# Patient Record
Sex: FEMALE | Employment: OTHER | URBAN - METROPOLITAN AREA
[De-identification: names, ages, dates, MRNs, and addresses within clinical notes are randomized per-mention and may not be internally consistent; named-entity substitution may affect disease eponyms.]

---

## 2017-01-01 ENCOUNTER — APPOINTMENT (OUTPATIENT)
Dept: ULTRASOUND IMAGING | Age: 67
DRG: 872 | End: 2017-01-01
Attending: HOSPITALIST
Payer: MEDICARE

## 2017-01-01 ENCOUNTER — APPOINTMENT (OUTPATIENT)
Dept: NUCLEAR MEDICINE | Age: 67
DRG: 478 | End: 2017-01-01
Attending: INTERNAL MEDICINE
Payer: MEDICARE

## 2017-01-01 ENCOUNTER — PATIENT OUTREACH (OUTPATIENT)
Dept: INTERNAL MEDICINE CLINIC | Age: 67
End: 2017-01-01

## 2017-01-01 ENCOUNTER — HOME CARE VISIT (OUTPATIENT)
Dept: HOSPICE | Facility: HOSPICE | Age: 67
End: 2017-01-01
Payer: MEDICARE

## 2017-01-01 ENCOUNTER — APPOINTMENT (OUTPATIENT)
Dept: PHYSICAL THERAPY | Age: 67
End: 2017-01-01
Payer: MEDICARE

## 2017-01-01 ENCOUNTER — APPOINTMENT (OUTPATIENT)
Dept: MRI IMAGING | Age: 67
DRG: 478 | End: 2017-01-01
Attending: HOSPITALIST
Payer: MEDICARE

## 2017-01-01 ENCOUNTER — HOSPITAL ENCOUNTER (OUTPATIENT)
Dept: PHYSICAL THERAPY | Age: 67
Discharge: HOME | End: 2017-07-06
Payer: MEDICARE

## 2017-01-01 ENCOUNTER — APPOINTMENT (OUTPATIENT)
Dept: NUCLEAR MEDICINE | Age: 67
DRG: 872 | End: 2017-01-01
Attending: INTERNAL MEDICINE
Payer: MEDICARE

## 2017-01-01 ENCOUNTER — HOSPICE ADMISSION (OUTPATIENT)
Dept: HOSPICE | Facility: HOSPICE | Age: 67
End: 2017-01-01
Payer: MEDICARE

## 2017-01-01 ENCOUNTER — HOSPITAL ENCOUNTER (EMERGENCY)
Age: 67
Discharge: HOME | End: 2017-03-03
Attending: EMERGENCY MEDICINE

## 2017-01-01 ENCOUNTER — HOME CARE VISIT (OUTPATIENT)
Dept: SCHEDULING | Facility: HOME HEALTH | Age: 67
End: 2017-01-01
Payer: MEDICARE

## 2017-01-01 ENCOUNTER — OFFICE VISIT (OUTPATIENT)
Dept: INTERNAL MEDICINE CLINIC | Age: 67
End: 2017-01-01

## 2017-01-01 ENCOUNTER — HOSPITAL ENCOUNTER (EMERGENCY)
Age: 67
Discharge: HOME | End: 2017-05-19
Attending: EMERGENCY MEDICINE

## 2017-01-01 ENCOUNTER — HOSPITAL ENCOUNTER (OUTPATIENT)
Dept: PHYSICAL THERAPY | Age: 67
Discharge: HOME | End: 2017-07-14
Payer: MEDICARE

## 2017-01-01 ENCOUNTER — APPOINTMENT (OUTPATIENT)
Dept: GENERAL RADIOLOGY | Age: 67
End: 2017-01-01
Attending: EMERGENCY MEDICINE

## 2017-01-01 ENCOUNTER — APPOINTMENT (OUTPATIENT)
Dept: CT IMAGING | Age: 67
DRG: 478 | End: 2017-01-01
Attending: EMERGENCY MEDICINE
Payer: MEDICARE

## 2017-01-01 ENCOUNTER — HOSPITAL ENCOUNTER (OUTPATIENT)
Dept: LAB | Age: 67
Discharge: HOME | End: 2017-04-06

## 2017-01-01 ENCOUNTER — HOSPITAL ENCOUNTER (OUTPATIENT)
Dept: PHYSICAL THERAPY | Age: 67
Discharge: HOME | End: 2017-07-03
Payer: MEDICARE

## 2017-01-01 ENCOUNTER — TELEPHONE (OUTPATIENT)
Dept: INTERNAL MEDICINE CLINIC | Age: 67
End: 2017-01-01

## 2017-01-01 ENCOUNTER — HOSPITAL ENCOUNTER (INPATIENT)
Age: 67
LOS: 1 days | End: 2017-09-20
Attending: INTERNAL MEDICINE | Admitting: INTERNAL MEDICINE

## 2017-01-01 ENCOUNTER — HOSPITAL ENCOUNTER (OUTPATIENT)
Dept: PHYSICAL THERAPY | Age: 67
Discharge: HOME | End: 2017-06-27
Payer: MEDICARE

## 2017-01-01 ENCOUNTER — HOSPITAL ENCOUNTER (OUTPATIENT)
Dept: PHYSICAL THERAPY | Age: 67
Discharge: HOME | End: 2017-06-29
Payer: MEDICARE

## 2017-01-01 ENCOUNTER — ANESTHESIA EVENT (OUTPATIENT)
Dept: ENDOSCOPY | Age: 67
End: 2017-01-01
Payer: MEDICARE

## 2017-01-01 ENCOUNTER — HOSPITAL ENCOUNTER (OUTPATIENT)
Dept: PHYSICAL THERAPY | Age: 67
Discharge: HOME | End: 2017-07-20
Payer: MEDICARE

## 2017-01-01 ENCOUNTER — TELEPHONE (OUTPATIENT)
Dept: ONCOLOGY | Age: 67
End: 2017-01-01

## 2017-01-01 ENCOUNTER — HOSPITAL ENCOUNTER (OUTPATIENT)
Dept: MAMMOGRAPHY | Age: 67
Discharge: HOME | End: 2017-04-14
Attending: FAMILY MEDICINE
Payer: MEDICARE

## 2017-01-01 ENCOUNTER — APPOINTMENT (OUTPATIENT)
Dept: GENERAL RADIOLOGY | Age: 67
DRG: 872 | End: 2017-01-01
Attending: EMERGENCY MEDICINE
Payer: MEDICARE

## 2017-01-01 ENCOUNTER — APPOINTMENT (OUTPATIENT)
Dept: CT IMAGING | Age: 67
DRG: 872 | End: 2017-01-01
Attending: HOSPITALIST
Payer: MEDICARE

## 2017-01-01 ENCOUNTER — HOSPITAL ENCOUNTER (INPATIENT)
Age: 67
LOS: 2 days | Discharge: HOME | DRG: 478 | End: 2017-08-09
Attending: EMERGENCY MEDICINE | Admitting: HOSPITALIST
Payer: MEDICARE

## 2017-01-01 ENCOUNTER — APPOINTMENT (OUTPATIENT)
Dept: CT IMAGING | Age: 67
DRG: 478 | End: 2017-01-01
Attending: INTERNAL MEDICINE
Payer: MEDICARE

## 2017-01-01 ENCOUNTER — ANESTHESIA (OUTPATIENT)
Dept: ENDOSCOPY | Age: 67
End: 2017-01-01
Payer: MEDICARE

## 2017-01-01 ENCOUNTER — HOSPITAL ENCOUNTER (OUTPATIENT)
Dept: MRI IMAGING | Age: 67
Discharge: HOME | End: 2017-07-29
Attending: INTERNAL MEDICINE
Payer: MEDICARE

## 2017-01-01 ENCOUNTER — HOSPITAL ENCOUNTER (INPATIENT)
Age: 67
LOS: 5 days | Discharge: HOME | DRG: 872 | End: 2017-08-16
Attending: EMERGENCY MEDICINE | Admitting: HOSPITALIST
Payer: MEDICARE

## 2017-01-01 ENCOUNTER — HOSPITAL ENCOUNTER (OUTPATIENT)
Age: 67
Setting detail: OUTPATIENT SURGERY
Discharge: HOME | End: 2017-05-10
Attending: INTERNAL MEDICINE | Admitting: INTERNAL MEDICINE
Payer: MEDICARE

## 2017-01-01 VITALS
SYSTOLIC BLOOD PRESSURE: 101 MMHG | TEMPERATURE: 97.2 F | DIASTOLIC BLOOD PRESSURE: 61 MMHG | OXYGEN SATURATION: 99 % | BODY MASS INDEX: 21.35 KG/M2 | HEIGHT: 67 IN | WEIGHT: 136.02 LBS | SYSTOLIC BLOOD PRESSURE: 114 MMHG | RESPIRATION RATE: 20 BRPM | HEART RATE: 114 BPM | DIASTOLIC BLOOD PRESSURE: 57 MMHG | HEART RATE: 102 BPM

## 2017-01-01 VITALS
DIASTOLIC BLOOD PRESSURE: 73 MMHG | RESPIRATION RATE: 20 BRPM | TEMPERATURE: 97.8 F | BODY MASS INDEX: 21.98 KG/M2 | SYSTOLIC BLOOD PRESSURE: 139 MMHG | WEIGHT: 145 LBS | HEIGHT: 68 IN | OXYGEN SATURATION: 100 % | HEART RATE: 66 BPM

## 2017-01-01 VITALS
RESPIRATION RATE: 17 BRPM | SYSTOLIC BLOOD PRESSURE: 124 MMHG | OXYGEN SATURATION: 98 % | DIASTOLIC BLOOD PRESSURE: 74 MMHG | WEIGHT: 142 LBS | BODY MASS INDEX: 21.52 KG/M2 | HEIGHT: 68 IN | HEART RATE: 86 BPM | TEMPERATURE: 97.7 F

## 2017-01-01 VITALS
HEIGHT: 68 IN | DIASTOLIC BLOOD PRESSURE: 71 MMHG | SYSTOLIC BLOOD PRESSURE: 140 MMHG | BODY MASS INDEX: 21.67 KG/M2 | TEMPERATURE: 97.8 F | WEIGHT: 143 LBS | HEART RATE: 93 BPM | OXYGEN SATURATION: 100 % | RESPIRATION RATE: 18 BRPM

## 2017-01-01 VITALS
TEMPERATURE: 97.9 F | SYSTOLIC BLOOD PRESSURE: 112 MMHG | DIASTOLIC BLOOD PRESSURE: 74 MMHG | RESPIRATION RATE: 20 BRPM | HEART RATE: 130 BPM

## 2017-01-01 VITALS
HEIGHT: 68 IN | RESPIRATION RATE: 14 BRPM | TEMPERATURE: 98.1 F | DIASTOLIC BLOOD PRESSURE: 70 MMHG | WEIGHT: 144.4 LBS | OXYGEN SATURATION: 98 % | SYSTOLIC BLOOD PRESSURE: 110 MMHG | HEART RATE: 81 BPM | BODY MASS INDEX: 21.89 KG/M2

## 2017-01-01 VITALS
HEART RATE: 86 BPM | WEIGHT: 143 LBS | RESPIRATION RATE: 16 BRPM | HEIGHT: 68 IN | SYSTOLIC BLOOD PRESSURE: 127 MMHG | BODY MASS INDEX: 21.67 KG/M2 | TEMPERATURE: 97.9 F | DIASTOLIC BLOOD PRESSURE: 75 MMHG | OXYGEN SATURATION: 95 %

## 2017-01-01 VITALS
SYSTOLIC BLOOD PRESSURE: 121 MMHG | OXYGEN SATURATION: 99 % | DIASTOLIC BLOOD PRESSURE: 79 MMHG | WEIGHT: 136 LBS | TEMPERATURE: 98.3 F | BODY MASS INDEX: 21.35 KG/M2 | HEIGHT: 67 IN | HEART RATE: 95 BPM | RESPIRATION RATE: 15 BRPM

## 2017-01-01 VITALS
HEART RATE: 111 BPM | HEIGHT: 67 IN | WEIGHT: 136 LBS | RESPIRATION RATE: 16 BRPM | BODY MASS INDEX: 21.35 KG/M2 | SYSTOLIC BLOOD PRESSURE: 110 MMHG | TEMPERATURE: 98.2 F | DIASTOLIC BLOOD PRESSURE: 70 MMHG | OXYGEN SATURATION: 98 %

## 2017-01-01 VITALS
SYSTOLIC BLOOD PRESSURE: 84 MMHG | DIASTOLIC BLOOD PRESSURE: 58 MMHG | RESPIRATION RATE: 18 BRPM | HEART RATE: 120 BPM | OXYGEN SATURATION: 97 % | TEMPERATURE: 100.7 F

## 2017-01-01 VITALS
HEART RATE: 97 BPM | BODY MASS INDEX: 21.07 KG/M2 | RESPIRATION RATE: 14 BRPM | HEIGHT: 68 IN | WEIGHT: 139 LBS | TEMPERATURE: 98.3 F | OXYGEN SATURATION: 98 % | SYSTOLIC BLOOD PRESSURE: 100 MMHG | DIASTOLIC BLOOD PRESSURE: 68 MMHG

## 2017-01-01 VITALS
OXYGEN SATURATION: 97 % | BODY MASS INDEX: 21.38 KG/M2 | DIASTOLIC BLOOD PRESSURE: 70 MMHG | SYSTOLIC BLOOD PRESSURE: 113 MMHG | HEART RATE: 81 BPM | WEIGHT: 136.25 LBS | HEIGHT: 67 IN | TEMPERATURE: 98 F | RESPIRATION RATE: 18 BRPM

## 2017-01-01 VITALS
HEART RATE: 69 BPM | TEMPERATURE: 97 F | BODY MASS INDEX: 22.13 KG/M2 | WEIGHT: 146 LBS | HEIGHT: 68 IN | SYSTOLIC BLOOD PRESSURE: 112 MMHG | OXYGEN SATURATION: 100 % | DIASTOLIC BLOOD PRESSURE: 59 MMHG | RESPIRATION RATE: 19 BRPM

## 2017-01-01 VITALS
DIASTOLIC BLOOD PRESSURE: 79 MMHG | SYSTOLIC BLOOD PRESSURE: 125 MMHG | RESPIRATION RATE: 22 BRPM | HEART RATE: 120 BPM | OXYGEN SATURATION: 98 % | TEMPERATURE: 97.5 F

## 2017-01-01 DIAGNOSIS — Z85.3 PERSONAL HISTORY OF MALIGNANT NEOPLASM OF BREAST: Primary | ICD-10-CM

## 2017-01-01 DIAGNOSIS — I10 ESSENTIAL HYPERTENSION, BENIGN: ICD-10-CM

## 2017-01-01 DIAGNOSIS — R42 DIZZINESS: Primary | ICD-10-CM

## 2017-01-01 DIAGNOSIS — R77.8 ELEVATED TROPONIN I LEVEL: ICD-10-CM

## 2017-01-01 DIAGNOSIS — C79.51 METASTATIC CANCER TO BONE (HCC): Primary | ICD-10-CM

## 2017-01-01 DIAGNOSIS — Z13.31 SCREENING FOR DEPRESSION: ICD-10-CM

## 2017-01-01 DIAGNOSIS — K21.9 GERD WITHOUT ESOPHAGITIS: ICD-10-CM

## 2017-01-01 DIAGNOSIS — C50.919 BREAST CANCER, STAGE 2, UNSPECIFIED LATERALITY (HCC): ICD-10-CM

## 2017-01-01 DIAGNOSIS — M54.16 LUMBAR RADICULOPATHY, ACUTE: ICD-10-CM

## 2017-01-01 DIAGNOSIS — Z23 ENCOUNTER FOR IMMUNIZATION: ICD-10-CM
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
## 2017-01-01 DIAGNOSIS — R79.81 LOW O2 SATURATION: ICD-10-CM

## 2017-01-01 DIAGNOSIS — N28.9 RENAL INSUFFICIENCY: ICD-10-CM

## 2017-01-01 DIAGNOSIS — C79.9 METASTATIC DISEASE (HCC): ICD-10-CM

## 2017-01-01 DIAGNOSIS — C79.89 MALIGNANT NEOPLASM METASTATIC TO OTHER SITE (HCC): Primary | ICD-10-CM

## 2017-01-01 DIAGNOSIS — A41.9 SEPSIS, DUE TO UNSPECIFIED ORGANISM: ICD-10-CM

## 2017-01-01 DIAGNOSIS — E87.1 HYPONATREMIA: ICD-10-CM

## 2017-01-01 DIAGNOSIS — E86.0 DEHYDRATION: ICD-10-CM

## 2017-01-01 DIAGNOSIS — Z12.11 SCREEN FOR COLON CANCER: ICD-10-CM

## 2017-01-01 DIAGNOSIS — N17.9 AKI (ACUTE KIDNEY INJURY) (HCC): ICD-10-CM

## 2017-01-01 DIAGNOSIS — Z76.89 ESTABLISHING CARE WITH NEW DOCTOR, ENCOUNTER FOR: ICD-10-CM

## 2017-01-01 DIAGNOSIS — C79.9 METASTATIC CANCER (HCC): ICD-10-CM

## 2017-01-01 DIAGNOSIS — R77.8 ELEVATED TOTAL PROTEIN: ICD-10-CM

## 2017-01-01 DIAGNOSIS — Z78.0 POSTMENOPAUSAL: ICD-10-CM

## 2017-01-01 DIAGNOSIS — M54.16 LUMBAR RADICULOPATHY, ACUTE: Primary | ICD-10-CM

## 2017-01-01 DIAGNOSIS — M54.31 SCIATICA OF RIGHT SIDE: Primary | ICD-10-CM

## 2017-01-01 DIAGNOSIS — Z12.31 ENCOUNTER FOR SCREENING MAMMOGRAM FOR MALIGNANT NEOPLASM OF BREAST: ICD-10-CM

## 2017-01-01 DIAGNOSIS — F32.0 MILD SINGLE CURRENT EPISODE OF MAJOR DEPRESSIVE DISORDER (HCC): ICD-10-CM

## 2017-01-01 DIAGNOSIS — R74.8 ELEVATED ALKALINE PHOSPHATASE LEVEL: ICD-10-CM

## 2017-01-01 DIAGNOSIS — Z11.59 NEED FOR HEPATITIS C SCREENING TEST: ICD-10-CM

## 2017-01-01 DIAGNOSIS — I10 ESSENTIAL HYPERTENSION, BENIGN: Primary | ICD-10-CM

## 2017-01-01 DIAGNOSIS — Z13.39 SCREENING FOR ALCOHOLISM: ICD-10-CM

## 2017-01-01 DIAGNOSIS — Z00.00 ROUTINE GENERAL MEDICAL EXAMINATION AT A HEALTH CARE FACILITY: ICD-10-CM

## 2017-01-01 DIAGNOSIS — C50.919 METASTATIC BREAST CANCER (HCC): Primary | ICD-10-CM

## 2017-01-01 LAB
ABO + RH BLD: NORMAL
ALBUMIN SERPL BCP-MCNC: 1.8 G/DL (ref 3.5–5)
ALBUMIN SERPL BCP-MCNC: 2.3 G/DL (ref 3.5–5)
ALBUMIN SERPL BCP-MCNC: 2.4 G/DL (ref 3.5–5)
ALBUMIN SERPL BCP-MCNC: 2.7 G/DL (ref 3.5–5)
ALBUMIN SERPL ELPH-MCNC: 2.6 G/DL (ref 2.9–4.4)
ALBUMIN SERPL-MCNC: 4.1 G/DL (ref 3.6–4.8)
ALBUMIN/GLOB SERPL: 0.4 {RATIO} (ref 1.1–2.2)
ALBUMIN/GLOB SERPL: 0.4 {RATIO} (ref 1.1–2.2)
ALBUMIN/GLOB SERPL: 0.5 {RATIO} (ref 1.1–2.2)
ALBUMIN/GLOB SERPL: 0.5 {RATIO} (ref 1.1–2.2)
ALBUMIN/GLOB SERPL: 0.6 {RATIO} (ref 0.7–1.7)
ALBUMIN/GLOB SERPL: 1.1 {RATIO} (ref 1.2–2.2)
ALP SERPL-CCNC: 310 U/L (ref 45–117)
ALP SERPL-CCNC: 325 U/L (ref 45–117)
ALP SERPL-CCNC: 377 U/L (ref 45–117)
ALP SERPL-CCNC: 416 U/L (ref 45–117)
ALP SERPL-CCNC: 77 IU/L (ref 39–117)
ALPHA1 GLOB SERPL ELPH-MCNC: 0.5 G/DL (ref 0–0.4)
ALPHA2 GLOB SERPL ELPH-MCNC: 0.9 G/DL (ref 0.4–1)
ALT SERPL-CCNC: 34 U/L (ref 12–78)
ALT SERPL-CCNC: 39 U/L (ref 12–78)
ALT SERPL-CCNC: 46 U/L (ref 12–78)
ALT SERPL-CCNC: 6 IU/L (ref 0–32)
ALT SERPL-CCNC: 76 U/L (ref 12–78)
AMORPH CRY URNS QL MICRO: ABNORMAL
AMORPH CRY URNS QL MICRO: ABNORMAL
ANION GAP BLD CALC-SCNC: 10 MMOL/L (ref 5–15)
ANION GAP BLD CALC-SCNC: 11 MMOL/L (ref 5–15)
ANION GAP BLD CALC-SCNC: 19 MMOL/L (ref 5–15)
ANION GAP BLD CALC-SCNC: 7 MMOL/L (ref 5–15)
ANION GAP BLD CALC-SCNC: 9 MMOL/L (ref 5–15)
ANION GAP BLD CALC-SCNC: 9 MMOL/L (ref 5–15)
APPEARANCE UR: ABNORMAL
APPEARANCE UR: ABNORMAL
AST SERPL W P-5'-P-CCNC: 169 U/L (ref 15–37)
AST SERPL W P-5'-P-CCNC: 43 U/L (ref 15–37)
AST SERPL W P-5'-P-CCNC: 49 U/L (ref 15–37)
AST SERPL W P-5'-P-CCNC: 58 U/L (ref 15–37)
AST SERPL-CCNC: 14 IU/L (ref 0–40)
ATRIAL RATE: 90 BPM
B-GLOBULIN SERPL ELPH-MCNC: 1.2 G/DL (ref 0.7–1.3)
BACTERIA SPEC CULT: NORMAL
BACTERIA SPEC CULT: NORMAL
BACTERIA URNS QL MICRO: NEGATIVE /HPF
BACTERIA URNS QL MICRO: NEGATIVE /HPF
BASOPHILS # BLD AUTO: 0 K/UL (ref 0–0.1)
BASOPHILS # BLD AUTO: 0.1 K/UL (ref 0–0.1)
BASOPHILS # BLD: 0 % (ref 0–1)
BASOPHILS # BLD: 1 % (ref 0–1)
BILIRUB SERPL-MCNC: 0.4 MG/DL (ref 0–1.2)
BILIRUB SERPL-MCNC: 0.6 MG/DL (ref 0.2–1)
BILIRUB SERPL-MCNC: 0.8 MG/DL (ref 0.2–1)
BILIRUB SERPL-MCNC: 1.3 MG/DL (ref 0.2–1)
BILIRUB SERPL-MCNC: 1.6 MG/DL (ref 0.2–1)
BILIRUB UR QL CFM: NEGATIVE
BILIRUB UR QL CFM: NEGATIVE
BLD PROD TYP BPU: NORMAL
BLD PROD TYP BPU: NORMAL
BLOOD GROUP ANTIBODIES SERPL: NORMAL
BPU ID: NORMAL
BPU ID: NORMAL
BUN BLD-MCNC: 17 MG/DL (ref 9–20)
BUN SERPL-MCNC: 11 MG/DL (ref 6–20)
BUN SERPL-MCNC: 13 MG/DL (ref 6–20)
BUN SERPL-MCNC: 15 MG/DL (ref 6–20)
BUN SERPL-MCNC: 17 MG/DL (ref 8–27)
BUN SERPL-MCNC: 7 MG/DL (ref 6–20)
BUN SERPL-MCNC: 8 MG/DL (ref 6–20)
BUN/CREAT SERPL: 10 (ref 12–20)
BUN/CREAT SERPL: 10 (ref 12–20)
BUN/CREAT SERPL: 12 (ref 12–20)
BUN/CREAT SERPL: 13 (ref 12–20)
BUN/CREAT SERPL: 14 (ref 12–20)
BUN/CREAT SERPL: 23 (ref 12–28)
BUN/CREAT SERPL: 9 (ref 12–20)
C DIFF TOX GENS STL QL NAA+PROBE: NEGATIVE
CA-I BLD-MCNC: 1.23 MMOL/L (ref 1.12–1.32)
CALCIUM SERPL-MCNC: 7.9 MG/DL (ref 8.5–10.1)
CALCIUM SERPL-MCNC: 8.5 MG/DL (ref 8.5–10.1)
CALCIUM SERPL-MCNC: 8.5 MG/DL (ref 8.5–10.1)
CALCIUM SERPL-MCNC: 8.6 MG/DL (ref 8.5–10.1)
CALCIUM SERPL-MCNC: 8.8 MG/DL (ref 8.5–10.1)
CALCIUM SERPL-MCNC: 8.9 MG/DL (ref 8.5–10.1)
CALCIUM SERPL-MCNC: 9 MG/DL (ref 8.5–10.1)
CALCIUM SERPL-MCNC: 9.3 MG/DL (ref 8.5–10.1)
CALCIUM SERPL-MCNC: 9.8 MG/DL (ref 8.7–10.3)
CALCULATED P AXIS, ECG09: 54 DEGREES
CALCULATED R AXIS, ECG10: -3 DEGREES
CALCULATED T AXIS, ECG11: 18 DEGREES
CANCER AG15-3 SERPL-ACNC: 2194 U/ML (ref 0–25)
CANCER AG27-29 SERPL-ACNC: 3843.3 U/ML (ref 0–38.6)
CHLORIDE BLD-SCNC: 98 MMOL/L (ref 98–107)
CHLORIDE SERPL-SCNC: 100 MMOL/L (ref 97–108)
CHLORIDE SERPL-SCNC: 101 MMOL/L (ref 97–108)
CHLORIDE SERPL-SCNC: 91 MMOL/L (ref 97–108)
CHLORIDE SERPL-SCNC: 97 MMOL/L (ref 97–108)
CHLORIDE SERPL-SCNC: 98 MMOL/L (ref 96–106)
CHLORIDE SERPL-SCNC: 98 MMOL/L (ref 97–108)
CHLORIDE SERPL-SCNC: 98 MMOL/L (ref 97–108)
CHLORIDE SERPL-SCNC: 99 MMOL/L (ref 97–108)
CHLORIDE SERPL-SCNC: 99 MMOL/L (ref 97–108)
CHOLEST SERPL-MCNC: 217 MG/DL
CK MB CFR SERPL CALC: ABNORMAL % (ref 0–2.5)
CK MB SERPL-MCNC: <1 NG/ML (ref 5–25)
CK SERPL-CCNC: 332 U/L (ref 26–192)
CO2 BLD-SCNC: 29 MMOL/L (ref 21–32)
CO2 SERPL-SCNC: 25 MMOL/L (ref 21–32)
CO2 SERPL-SCNC: 25 MMOL/L (ref 21–32)
CO2 SERPL-SCNC: 26 MMOL/L (ref 18–29)
CO2 SERPL-SCNC: 26 MMOL/L (ref 21–32)
CO2 SERPL-SCNC: 27 MMOL/L (ref 21–32)
CO2 SERPL-SCNC: 29 MMOL/L (ref 21–32)
CO2 SERPL-SCNC: 29 MMOL/L (ref 21–32)
COLOR UR: ABNORMAL
COLOR UR: ABNORMAL
CREAT BLD-MCNC: 0.8 MG/DL (ref 0.6–1.3)
CREAT SERPL-MCNC: 0.62 MG/DL (ref 0.55–1.02)
CREAT SERPL-MCNC: 0.64 MG/DL (ref 0.55–1.02)
CREAT SERPL-MCNC: 0.68 MG/DL (ref 0.55–1.02)
CREAT SERPL-MCNC: 0.7 MG/DL (ref 0.55–1.02)
CREAT SERPL-MCNC: 0.75 MG/DL (ref 0.57–1)
CREAT SERPL-MCNC: 0.77 MG/DL (ref 0.55–1.02)
CREAT SERPL-MCNC: 0.83 MG/DL (ref 0.55–1.02)
CREAT SERPL-MCNC: 0.99 MG/DL (ref 0.55–1.02)
CREAT SERPL-MCNC: 1.74 MG/DL (ref 0.55–1.02)
CROSSMATCH RESULT,%XM: NORMAL
CROSSMATCH RESULT,%XM: NORMAL
DIAGNOSIS, 93000: NORMAL
DIFFERENTIAL METHOD BLD: ABNORMAL
DIFFERENTIAL METHOD BLD: ABNORMAL
EOSINOPHIL # BLD: 0 K/UL (ref 0–0.4)
EOSINOPHIL # BLD: 0.1 K/UL (ref 0–0.4)
EOSINOPHIL NFR BLD: 0 % (ref 0–7)
EOSINOPHIL NFR BLD: 1 % (ref 0–7)
EPITH CASTS URNS QL MICRO: ABNORMAL /LPF
EPITH CASTS URNS QL MICRO: ABNORMAL /LPF
ERYTHROCYTE [DISTWIDTH] IN BLOOD BY AUTOMATED COUNT: 13.1 % (ref 12.3–15.4)
ERYTHROCYTE [DISTWIDTH] IN BLOOD BY AUTOMATED COUNT: 13.7 % (ref 11.5–14.5)
ERYTHROCYTE [DISTWIDTH] IN BLOOD BY AUTOMATED COUNT: 13.8 % (ref 11.5–14.5)
ERYTHROCYTE [DISTWIDTH] IN BLOOD BY AUTOMATED COUNT: 14 % (ref 11.5–14.5)
ERYTHROCYTE [DISTWIDTH] IN BLOOD BY AUTOMATED COUNT: 14.2 % (ref 11.5–14.5)
ERYTHROCYTE [DISTWIDTH] IN BLOOD BY AUTOMATED COUNT: 14.4 % (ref 11.5–14.5)
ERYTHROCYTE [DISTWIDTH] IN BLOOD BY AUTOMATED COUNT: 14.5 % (ref 11.5–14.5)
ERYTHROCYTE [DISTWIDTH] IN BLOOD BY AUTOMATED COUNT: 14.5 % (ref 11.5–14.5)
ERYTHROCYTE [DISTWIDTH] IN BLOOD BY AUTOMATED COUNT: 15.1 % (ref 11.5–14.5)
FERRITIN SERPL-MCNC: 673 NG/ML (ref 8–252)
FOLATE SERPL-MCNC: 13 NG/ML (ref 5–21)
GAMMA GLOB SERPL ELPH-MCNC: 1.5 G/DL (ref 0.4–1.8)
GLOBULIN SER CALC-MCNC: 3.6 G/DL (ref 1.5–4.5)
GLOBULIN SER CALC-MCNC: 4.2 G/DL (ref 2.2–3.9)
GLOBULIN SER CALC-MCNC: 5 G/DL (ref 2–4)
GLOBULIN SER CALC-MCNC: 5 G/DL (ref 2–4)
GLOBULIN SER CALC-MCNC: 5.6 G/DL (ref 2–4)
GLOBULIN SER CALC-MCNC: 5.9 G/DL (ref 2–4)
GLUCOSE BLD-MCNC: 94 MG/DL (ref 65–105)
GLUCOSE POC: 94 MG/DL
GLUCOSE SERPL-MCNC: 100 MG/DL (ref 65–100)
GLUCOSE SERPL-MCNC: 121 MG/DL (ref 65–100)
GLUCOSE SERPL-MCNC: 131 MG/DL (ref 65–100)
GLUCOSE SERPL-MCNC: 206 MG/DL (ref 65–100)
GLUCOSE SERPL-MCNC: 84 MG/DL (ref 65–100)
GLUCOSE SERPL-MCNC: 85 MG/DL (ref 65–99)
GLUCOSE SERPL-MCNC: 95 MG/DL (ref 65–100)
GLUCOSE SERPL-MCNC: 97 MG/DL (ref 65–100)
GLUCOSE SERPL-MCNC: 97 MG/DL (ref 65–100)
GLUCOSE UR STRIP.AUTO-MCNC: NEGATIVE MG/DL
GLUCOSE UR STRIP.AUTO-MCNC: NEGATIVE MG/DL
HCT VFR BLD AUTO: 20.3 % (ref 35–47)
HCT VFR BLD AUTO: 25.3 % (ref 35–47)
HCT VFR BLD AUTO: 25.7 % (ref 35–47)
HCT VFR BLD AUTO: 26.3 % (ref 35–47)
HCT VFR BLD AUTO: 27.3 % (ref 35–47)
HCT VFR BLD AUTO: 27.4 % (ref 35–47)
HCT VFR BLD AUTO: 27.6 % (ref 35–47)
HCT VFR BLD AUTO: 31.9 % (ref 35–47)
HCT VFR BLD AUTO: 34 % (ref 34–46.6)
HCT VFR BLD CALC: 40 % (ref 35–47)
HCV AB S/CO SERPL IA: <0.1 S/CO RATIO (ref 0–0.9)
HDLC SERPL-MCNC: 73 MG/DL
HEMOCCULT STL QL IA: NEGATIVE
HEMOCCULT STL QL: NEGATIVE
HGB BLD-MCNC: 10.6 G/DL (ref 11.5–16)
HGB BLD-MCNC: 11.1 G/DL (ref 11.1–15.9)
HGB BLD-MCNC: 13.6 GM/DL (ref 11.5–16)
HGB BLD-MCNC: 6.4 G/DL (ref 11.5–16)
HGB BLD-MCNC: 8.1 G/DL (ref 11.5–16)
HGB BLD-MCNC: 8.2 G/DL (ref 11.5–16)
HGB BLD-MCNC: 8.4 G/DL (ref 11.5–16)
HGB BLD-MCNC: 8.6 G/DL (ref 11.5–16)
HGB BLD-MCNC: 8.6 G/DL (ref 11.5–16)
HGB BLD-MCNC: 9.1 G/DL (ref 11.5–16)
HGB UR QL STRIP: NEGATIVE
HGB UR QL STRIP: NEGATIVE
IGA SERPL-MCNC: 321 MG/DL (ref 87–352)
IGG SERPL-MCNC: 1752 MG/DL (ref 700–1600)
IGM SERPL-MCNC: 107 MG/DL (ref 26–217)
IRON SATN MFR SERPL: 15 % (ref 20–50)
IRON SERPL-MCNC: 23 UG/DL (ref 35–150)
KAPPA LC FREE SER-MCNC: 37.9 MG/L (ref 3.3–19.4)
KAPPA LC FREE/LAMBDA FREE SER: 0.91 {RATIO} (ref 0.26–1.65)
KETONES UR QL STRIP.AUTO: 15 MG/DL
KETONES UR QL STRIP.AUTO: NEGATIVE MG/DL
LACTATE SERPL-SCNC: 1.2 MMOL/L (ref 0.4–2)
LAMBDA LC FREE SERPL-MCNC: 41.8 MG/L (ref 5.7–26.3)
LDH SERPL L TO P-CCNC: 368 U/L (ref 81–246)
LDL CHOLESTEROL POC: 144 MG/DL
LEUKOCYTE ESTERASE UR QL STRIP.AUTO: NEGATIVE
LEUKOCYTE ESTERASE UR QL STRIP.AUTO: NEGATIVE
LYMPHOCYTES # BLD AUTO: 11 % (ref 12–49)
LYMPHOCYTES # BLD AUTO: 12 % (ref 12–49)
LYMPHOCYTES # BLD AUTO: 5 % (ref 12–49)
LYMPHOCYTES # BLD AUTO: 6 % (ref 12–49)
LYMPHOCYTES # BLD AUTO: 6 % (ref 12–49)
LYMPHOCYTES # BLD AUTO: 8 % (ref 12–49)
LYMPHOCYTES # BLD: 0.9 K/UL (ref 0.8–3.5)
LYMPHOCYTES # BLD: 0.9 K/UL (ref 0.8–3.5)
LYMPHOCYTES # BLD: 1.2 K/UL (ref 0.8–3.5)
LYMPHOCYTES # BLD: 1.6 K/UL (ref 0.8–3.5)
M PROTEIN SERPL ELPH-MCNC: ABNORMAL G/DL
MAGNESIUM SERPL-MCNC: 1.9 MG/DL (ref 1.6–2.4)
MCH RBC QN AUTO: 28.1 PG (ref 26–34)
MCH RBC QN AUTO: 28.4 PG (ref 26–34)
MCH RBC QN AUTO: 28.4 PG (ref 26–34)
MCH RBC QN AUTO: 28.5 PG (ref 26–34)
MCH RBC QN AUTO: 28.8 PG (ref 26–34)
MCH RBC QN AUTO: 28.8 PG (ref 26–34)
MCH RBC QN AUTO: 28.9 PG (ref 26–34)
MCH RBC QN AUTO: 29.3 PG (ref 26–34)
MCH RBC QN AUTO: 31.3 PG (ref 26.6–33)
MCHC RBC AUTO-ENTMCNC: 30.8 G/DL (ref 30–36.5)
MCHC RBC AUTO-ENTMCNC: 31.4 G/DL (ref 30–36.5)
MCHC RBC AUTO-ENTMCNC: 31.5 G/DL (ref 30–36.5)
MCHC RBC AUTO-ENTMCNC: 31.5 G/DL (ref 30–36.5)
MCHC RBC AUTO-ENTMCNC: 32.4 G/DL (ref 30–36.5)
MCHC RBC AUTO-ENTMCNC: 32.6 G/DL (ref 31.5–35.7)
MCHC RBC AUTO-ENTMCNC: 32.7 G/DL (ref 30–36.5)
MCHC RBC AUTO-ENTMCNC: 33 G/DL (ref 30–36.5)
MCHC RBC AUTO-ENTMCNC: 33.2 G/DL (ref 30–36.5)
MCV RBC AUTO: 87.3 FL (ref 80–99)
MCV RBC AUTO: 88 FL (ref 80–99)
MCV RBC AUTO: 88.1 FL (ref 80–99)
MCV RBC AUTO: 89 FL (ref 80–99)
MCV RBC AUTO: 89.1 FL (ref 80–99)
MCV RBC AUTO: 90.4 FL (ref 80–99)
MCV RBC AUTO: 90.5 FL (ref 80–99)
MCV RBC AUTO: 92.2 FL (ref 80–99)
MCV RBC AUTO: 96 FL (ref 79–97)
MONOCYTES # BLD: 1 K/UL (ref 0–1)
MONOCYTES # BLD: 1.3 K/UL (ref 0–1)
MONOCYTES # BLD: 1.5 K/UL (ref 0–1)
MONOCYTES # BLD: 1.7 K/UL (ref 0–1)
MONOCYTES # BLD: 1.7 K/UL (ref 0–1)
MONOCYTES # BLD: 2.4 K/UL (ref 0–1)
MONOCYTES NFR BLD AUTO: 10 % (ref 5–13)
MONOCYTES NFR BLD AUTO: 10 % (ref 5–13)
MONOCYTES NFR BLD AUTO: 12 % (ref 5–13)
MONOCYTES NFR BLD AUTO: 13 % (ref 5–13)
MONOCYTES NFR BLD AUTO: 9 % (ref 5–13)
MONOCYTES NFR BLD AUTO: 9 % (ref 5–13)
NEUTS SEG # BLD: 12.6 K/UL (ref 1.8–8)
NEUTS SEG # BLD: 14.3 K/UL (ref 1.8–8)
NEUTS SEG # BLD: 16.1 K/UL (ref 1.8–8)
NEUTS SEG # BLD: 16.5 K/UL (ref 1.8–8)
NEUTS SEG # BLD: 7.1 K/UL (ref 1.8–8)
NEUTS SEG # BLD: 8.7 K/UL (ref 1.8–8)
NEUTS SEG NFR BLD AUTO: 73 % (ref 32–75)
NEUTS SEG NFR BLD AUTO: 80 % (ref 32–75)
NEUTS SEG NFR BLD AUTO: 82 % (ref 32–75)
NEUTS SEG NFR BLD AUTO: 83 % (ref 32–75)
NEUTS SEG NFR BLD AUTO: 84 % (ref 32–75)
NEUTS SEG NFR BLD AUTO: 85 % (ref 32–75)
NITRITE UR QL STRIP.AUTO: NEGATIVE
NITRITE UR QL STRIP.AUTO: NEGATIVE
NON-HDL GOAL (POC): 144
P-R INTERVAL, ECG05: 106 MS
PH UR STRIP: 5 [PH] (ref 5–8)
PH UR STRIP: 6 [PH] (ref 5–8)
PHOSPHATE SERPL-MCNC: 2.4 MG/DL (ref 2.6–4.7)
PLATELET # BLD AUTO: 259 K/UL (ref 150–400)
PLATELET # BLD AUTO: 284 K/UL (ref 150–400)
PLATELET # BLD AUTO: 304 K/UL (ref 150–400)
PLATELET # BLD AUTO: 310 K/UL (ref 150–400)
PLATELET # BLD AUTO: 327 K/UL (ref 150–400)
PLATELET # BLD AUTO: 328 K/UL (ref 150–400)
PLATELET # BLD AUTO: 366 K/UL (ref 150–400)
PLATELET # BLD AUTO: 377 X10E3/UL (ref 150–379)
PLATELET # BLD AUTO: 441 K/UL (ref 150–400)
PLATELET COMMENTS,PCOM: ABNORMAL
POTASSIUM BLD-SCNC: 3.7 MMOL/L (ref 3.5–5.1)
POTASSIUM SERPL-SCNC: 3.2 MMOL/L (ref 3.5–5.1)
POTASSIUM SERPL-SCNC: 3.4 MMOL/L (ref 3.5–5.1)
POTASSIUM SERPL-SCNC: 3.6 MMOL/L (ref 3.5–5.1)
POTASSIUM SERPL-SCNC: 3.6 MMOL/L (ref 3.5–5.1)
POTASSIUM SERPL-SCNC: 3.7 MMOL/L (ref 3.5–5.1)
POTASSIUM SERPL-SCNC: 3.8 MMOL/L (ref 3.5–5.1)
POTASSIUM SERPL-SCNC: 3.9 MMOL/L (ref 3.5–5.1)
POTASSIUM SERPL-SCNC: 4.1 MMOL/L (ref 3.5–5.1)
POTASSIUM SERPL-SCNC: 5 MMOL/L (ref 3.5–5.2)
PROT PATTERN SERPL IFE-IMP: ABNORMAL
PROT SERPL-MCNC: 6.8 G/DL (ref 6.4–8.2)
PROT SERPL-MCNC: 6.8 G/DL (ref 6–8.5)
PROT SERPL-MCNC: 7.3 G/DL (ref 6.4–8.2)
PROT SERPL-MCNC: 7.7 G/DL (ref 6–8.5)
PROT SERPL-MCNC: 8.3 G/DL (ref 6.4–8.2)
PROT SERPL-MCNC: 8.3 G/DL (ref 6.4–8.2)
PROT UR STRIP-MCNC: 30 MG/DL
PROT UR STRIP-MCNC: 30 MG/DL
Q-T INTERVAL, ECG07: 380 MS
QRS DURATION, ECG06: 76 MS
QTC CALCULATION (BEZET), ECG08: 464 MS
RBC # BLD AUTO: 2.28 M/UL (ref 3.8–5.2)
RBC # BLD AUTO: 2.84 M/UL (ref 3.8–5.2)
RBC # BLD AUTO: 2.84 M/UL (ref 3.8–5.2)
RBC # BLD AUTO: 2.96 M/UL (ref 3.8–5.2)
RBC # BLD AUTO: 2.99 M/UL (ref 3.8–5.2)
RBC # BLD AUTO: 3.03 M/UL (ref 3.8–5.2)
RBC # BLD AUTO: 3.16 M/UL (ref 3.8–5.2)
RBC # BLD AUTO: 3.55 X10E6/UL (ref 3.77–5.28)
RBC # BLD AUTO: 3.62 M/UL (ref 3.8–5.2)
RBC #/AREA URNS HPF: ABNORMAL /HPF (ref 0–5)
RBC #/AREA URNS HPF: ABNORMAL /HPF (ref 0–5)
RBC MORPH BLD: ABNORMAL
RETICS/RBC NFR AUTO: 3.1 % (ref 0.7–2.1)
SERVICE CMNT-IMP: ABNORMAL
SERVICE CMNT-IMP: NORMAL
SERVICE CMNT-IMP: NORMAL
SODIUM BLD-SCNC: 141 MMOL/L (ref 136–145)
SODIUM SERPL-SCNC: 129 MMOL/L (ref 136–145)
SODIUM SERPL-SCNC: 132 MMOL/L (ref 136–145)
SODIUM SERPL-SCNC: 134 MMOL/L (ref 136–145)
SODIUM SERPL-SCNC: 136 MMOL/L (ref 136–145)
SODIUM SERPL-SCNC: 137 MMOL/L (ref 136–145)
SODIUM SERPL-SCNC: 138 MMOL/L (ref 136–145)
SODIUM SERPL-SCNC: 139 MMOL/L (ref 134–144)
SP GR UR REFRACTOMETRY: 1.01 (ref 1–1.03)
SP GR UR REFRACTOMETRY: 1.03 (ref 1–1.03)
SPECIMEN EXP DATE BLD: NORMAL
STATUS OF UNIT,%ST: NORMAL
STATUS OF UNIT,%ST: NORMAL
TCHOL/HDL RATIO (POC): 3
TIBC SERPL-MCNC: 158 UG/DL (ref 250–450)
TRIGL SERPL-MCNC: 191 MG/DL
TROPONIN I SERPL-MCNC: 0.09 NG/ML
TROPONIN I SERPL-MCNC: 0.1 NG/ML
TSH SERPL DL<=0.05 MIU/L-ACNC: 3.14 UIU/ML (ref 0.36–3.74)
UNIT DIVISION, %UDIV: 0
UNIT DIVISION, %UDIV: 0
UR CULT HOLD, URHOLD: NORMAL
UROBILINOGEN UR QL STRIP.AUTO: 1 EU/DL (ref 0.2–1)
UROBILINOGEN UR QL STRIP.AUTO: 2 EU/DL (ref 0.2–1)
VENTRICULAR RATE, ECG03: 90 BPM
VIT B12 SERPL-MCNC: 723 PG/ML (ref 211–911)
WBC # BLD AUTO: 11 K/UL (ref 3.6–11)
WBC # BLD AUTO: 15 K/UL (ref 3.6–11)
WBC # BLD AUTO: 16.8 K/UL (ref 3.6–11)
WBC # BLD AUTO: 19.1 K/UL (ref 3.6–11)
WBC # BLD AUTO: 19.2 K/UL (ref 3.6–11)
WBC # BLD AUTO: 19.5 K/UL (ref 3.6–11)
WBC # BLD AUTO: 20.1 K/UL (ref 3.6–11)
WBC # BLD AUTO: 4.8 X10E3/UL (ref 3.4–10.8)
WBC # BLD AUTO: 9.7 K/UL (ref 3.6–11)
WBC URNS QL MICRO: ABNORMAL /HPF (ref 0–4)
WBC URNS QL MICRO: ABNORMAL /HPF (ref 0–4)

## 2017-01-01 PROCEDURE — 97535 SELF CARE MNGMENT TRAINING: CPT

## 2017-01-01 PROCEDURE — 74011000250 HC RX REV CODE- 250: Performed by: NURSE PRACTITIONER

## 2017-01-01 PROCEDURE — HOSPICE MEDICATION HC HH HOSPICE MEDICATION

## 2017-01-01 PROCEDURE — 97110 THERAPEUTIC EXERCISES: CPT

## 2017-01-01 PROCEDURE — 77030027957 HC TBNG IRR ENDOGTR BUSS -B: Performed by: INTERNAL MEDICINE

## 2017-01-01 PROCEDURE — A6250 SKIN SEAL PROTECT MOISTURIZR: HCPCS

## 2017-01-01 PROCEDURE — 74011250636 HC RX REV CODE- 250/636: Performed by: HOSPITALIST

## 2017-01-01 PROCEDURE — 0651 HSPC ROUTINE HOME CARE

## 2017-01-01 PROCEDURE — 84484 ASSAY OF TROPONIN QUANT: CPT | Performed by: EMERGENCY MEDICINE

## 2017-01-01 PROCEDURE — 74011250636 HC RX REV CODE- 250/636: Performed by: INTERNAL MEDICINE

## 2017-01-01 PROCEDURE — 80053 COMPREHEN METABOLIC PANEL: CPT | Performed by: HOSPITALIST

## 2017-01-01 PROCEDURE — 71260 CT THORAX DX C+: CPT

## 2017-01-01 PROCEDURE — 74011250637 HC RX REV CODE- 250/637: Performed by: HOSPITALIST

## 2017-01-01 PROCEDURE — 86300 IMMUNOASSAY TUMOR CA 15-3: CPT | Performed by: INTERNAL MEDICINE

## 2017-01-01 PROCEDURE — 85025 COMPLETE CBC W/AUTO DIFF WBC: CPT | Performed by: EMERGENCY MEDICINE

## 2017-01-01 PROCEDURE — 80053 COMPREHEN METABOLIC PANEL: CPT | Performed by: EMERGENCY MEDICINE

## 2017-01-01 PROCEDURE — G8981 BODY POS CURRENT STATUS: HCPCS

## 2017-01-01 PROCEDURE — 36415 COLL VENOUS BLD VENIPUNCTURE: CPT | Performed by: EMERGENCY MEDICINE

## 2017-01-01 PROCEDURE — 77030003666 HC NDL SPINAL BD -A

## 2017-01-01 PROCEDURE — 74011250636 HC RX REV CODE- 250/636: Performed by: EMERGENCY MEDICINE

## 2017-01-01 PROCEDURE — 97165 OT EVAL LOW COMPLEX 30 MIN: CPT

## 2017-01-01 PROCEDURE — 74011250637 HC RX REV CODE- 250/637: Performed by: INTERNAL MEDICINE

## 2017-01-01 PROCEDURE — A9576 INJ PROHANCE MULTIPACK: HCPCS | Performed by: HOSPITALIST

## 2017-01-01 PROCEDURE — 83540 ASSAY OF IRON: CPT | Performed by: INTERNAL MEDICINE

## 2017-01-01 PROCEDURE — 88307 TISSUE EXAM BY PATHOLOGIST: CPT | Performed by: RADIOLOGY

## 2017-01-01 PROCEDURE — 97140 MANUAL THERAPY 1/> REGIONS: CPT

## 2017-01-01 PROCEDURE — 65270000029 HC RM PRIVATE

## 2017-01-01 PROCEDURE — 99285 EMERGENCY DEPT VISIT HI MDM: CPT

## 2017-01-01 PROCEDURE — 82784 ASSAY IGA/IGD/IGG/IGM EACH: CPT | Performed by: INTERNAL MEDICINE

## 2017-01-01 PROCEDURE — 36415 COLL VENOUS BLD VENIPUNCTURE: CPT | Performed by: HOSPITALIST

## 2017-01-01 PROCEDURE — 97161 PT EVAL LOW COMPLEX 20 MIN: CPT

## 2017-01-01 PROCEDURE — 71010 XR CHEST PORT: CPT

## 2017-01-01 PROCEDURE — A4927 NON-STERILE GLOVES: HCPCS

## 2017-01-01 PROCEDURE — G0156 HHCP-SVS OF AIDE,EA 15 MIN: HCPCS

## 2017-01-01 PROCEDURE — G0155 HHCP-SVS OF CSW,EA 15 MIN: HCPCS

## 2017-01-01 PROCEDURE — 80048 BASIC METABOLIC PNL TOTAL CA: CPT | Performed by: HOSPITALIST

## 2017-01-01 PROCEDURE — 74011000258 HC RX REV CODE- 258: Performed by: HOSPITALIST

## 2017-01-01 PROCEDURE — 85025 COMPLETE CBC W/AUTO DIFF WBC: CPT | Performed by: HOSPITALIST

## 2017-01-01 PROCEDURE — 0655 HSPC INPATIENT RESPITE

## 2017-01-01 PROCEDURE — G0299 HHS/HOSPICE OF RN EA 15 MIN: HCPCS

## 2017-01-01 PROCEDURE — 87040 BLOOD CULTURE FOR BACTERIA: CPT | Performed by: HOSPITALIST

## 2017-01-01 PROCEDURE — 81001 URINALYSIS AUTO W/SCOPE: CPT | Performed by: EMERGENCY MEDICINE

## 2017-01-01 PROCEDURE — 77080 DXA BONE DENSITY AXIAL: CPT

## 2017-01-01 PROCEDURE — 96375 TX/PRO/DX INJ NEW DRUG ADDON: CPT

## 2017-01-01 PROCEDURE — G8982 BODY POS GOAL STATUS: HCPCS

## 2017-01-01 PROCEDURE — 74177 CT ABD & PELVIS W/CONTRAST: CPT

## 2017-01-01 PROCEDURE — 83735 ASSAY OF MAGNESIUM: CPT | Performed by: HOSPITALIST

## 2017-01-01 PROCEDURE — 36430 TRANSFUSION BLD/BLD COMPNT: CPT

## 2017-01-01 PROCEDURE — 70553 MRI BRAIN STEM W/O & W/DYE: CPT

## 2017-01-01 PROCEDURE — 86900 BLOOD TYPING SEROLOGIC ABO: CPT | Performed by: NURSE PRACTITIONER

## 2017-01-01 PROCEDURE — 82746 ASSAY OF FOLIC ACID SERUM: CPT | Performed by: INTERNAL MEDICINE

## 2017-01-01 PROCEDURE — 74011636320 HC RX REV CODE- 636/320: Performed by: HOSPITALIST

## 2017-01-01 PROCEDURE — 97116 GAIT TRAINING THERAPY: CPT

## 2017-01-01 PROCEDURE — 84165 PROTEIN E-PHORESIS SERUM: CPT | Performed by: INTERNAL MEDICINE

## 2017-01-01 PROCEDURE — 99001 SPECIMEN HANDLING PT-LAB: CPT | Performed by: FAMILY MEDICINE

## 2017-01-01 PROCEDURE — HHS10554 SHAMPOO/BODY WASH 8 OZ ALOE VESTA

## 2017-01-01 PROCEDURE — A9576 INJ PROHANCE MULTIPACK: HCPCS | Performed by: INTERNAL MEDICINE

## 2017-01-01 PROCEDURE — 85027 COMPLETE CBC AUTOMATED: CPT | Performed by: HOSPITALIST

## 2017-01-01 PROCEDURE — 65270000032 HC RM SEMIPRIVATE

## 2017-01-01 PROCEDURE — 70450 CT HEAD/BRAIN W/O DYE: CPT

## 2017-01-01 PROCEDURE — 88342 IMHCHEM/IMCYTCHM 1ST ANTB: CPT | Performed by: RADIOLOGY

## 2017-01-01 PROCEDURE — 76040000019: Performed by: INTERNAL MEDICINE

## 2017-01-01 PROCEDURE — 74011250636 HC RX REV CODE- 250/636

## 2017-01-01 PROCEDURE — 74011250636 HC RX REV CODE- 250/636: Performed by: NURSE PRACTITIONER

## 2017-01-01 PROCEDURE — 83615 LACTATE (LD) (LDH) ENZYME: CPT | Performed by: HOSPITALIST

## 2017-01-01 PROCEDURE — 96361 HYDRATE IV INFUSION ADD-ON: CPT

## 2017-01-01 PROCEDURE — 72158 MRI LUMBAR SPINE W/O & W/DYE: CPT

## 2017-01-01 PROCEDURE — 83883 ASSAY NEPHELOMETRY NOT SPEC: CPT | Performed by: INTERNAL MEDICINE

## 2017-01-01 PROCEDURE — 99284 EMERGENCY DEPT VISIT MOD MDM: CPT

## 2017-01-01 PROCEDURE — 93971 EXTREMITY STUDY: CPT

## 2017-01-01 PROCEDURE — 82728 ASSAY OF FERRITIN: CPT | Performed by: INTERNAL MEDICINE

## 2017-01-01 PROCEDURE — 0QB23ZX EXCISION OF RIGHT PELVIC BONE, PERCUTANEOUS APPROACH, DIAGNOSTIC: ICD-10-PCS | Performed by: RADIOLOGY

## 2017-01-01 PROCEDURE — G8983 BODY POS D/C STATUS: HCPCS

## 2017-01-01 PROCEDURE — 99152 MOD SED SAME PHYS/QHP 5/>YRS: CPT

## 2017-01-01 PROCEDURE — 84443 ASSAY THYROID STIM HORMONE: CPT | Performed by: HOSPITALIST

## 2017-01-01 PROCEDURE — T4541 LARGE DISPOSABLE UNDERPAD: HCPCS

## 2017-01-01 PROCEDURE — 88360 TUMOR IMMUNOHISTOCHEM/MANUAL: CPT | Performed by: RADIOLOGY

## 2017-01-01 PROCEDURE — 86923 COMPATIBILITY TEST ELECTRIC: CPT | Performed by: NURSE PRACTITIONER

## 2017-01-01 PROCEDURE — 74011250636 HC RX REV CODE- 250/636: Performed by: RADIOLOGY

## 2017-01-01 PROCEDURE — 82272 OCCULT BLD FECES 1-3 TESTS: CPT | Performed by: HOSPITALIST

## 2017-01-01 PROCEDURE — 87040 BLOOD CULTURE FOR BACTERIA: CPT | Performed by: EMERGENCY MEDICINE

## 2017-01-01 PROCEDURE — 87493 C DIFF AMPLIFIED PROBE: CPT | Performed by: HOSPITALIST

## 2017-01-01 PROCEDURE — 77030018781

## 2017-01-01 PROCEDURE — 97162 PT EVAL MOD COMPLEX 30 MIN: CPT

## 2017-01-01 PROCEDURE — 77030003503 HC NDL BIOP TISS BD -B

## 2017-01-01 PROCEDURE — 85045 AUTOMATED RETICULOCYTE COUNT: CPT | Performed by: INTERNAL MEDICINE

## 2017-01-01 PROCEDURE — 96360 HYDRATION IV INFUSION INIT: CPT

## 2017-01-01 PROCEDURE — 84100 ASSAY OF PHOSPHORUS: CPT | Performed by: HOSPITALIST

## 2017-01-01 PROCEDURE — 82550 ASSAY OF CK (CPK): CPT | Performed by: HOSPITALIST

## 2017-01-01 PROCEDURE — 3336500001 HSPC ELECTION

## 2017-01-01 PROCEDURE — P9016 RBC LEUKOCYTES REDUCED: HCPCS | Performed by: NURSE PRACTITIONER

## 2017-01-01 PROCEDURE — 93005 ELECTROCARDIOGRAM TRACING: CPT

## 2017-01-01 PROCEDURE — 88341 IMHCHEM/IMCYTCHM EA ADD ANTB: CPT | Performed by: RADIOLOGY

## 2017-01-01 PROCEDURE — 83605 ASSAY OF LACTIC ACID: CPT | Performed by: EMERGENCY MEDICINE

## 2017-01-01 PROCEDURE — 96374 THER/PROPH/DIAG INJ IV PUSH: CPT

## 2017-01-01 PROCEDURE — 88112 CYTOPATH CELL ENHANCE TECH: CPT | Performed by: RADIOLOGY

## 2017-01-01 PROCEDURE — A9503 TC99M MEDRONATE: HCPCS

## 2017-01-01 PROCEDURE — 84484 ASSAY OF TROPONIN QUANT: CPT | Performed by: HOSPITALIST

## 2017-01-01 PROCEDURE — 82607 VITAMIN B-12: CPT | Performed by: INTERNAL MEDICINE

## 2017-01-01 PROCEDURE — 76700 US EXAM ABDOM COMPLETE: CPT

## 2017-01-01 PROCEDURE — 78806 NM INFLAM PROC WH BODY: CPT

## 2017-01-01 PROCEDURE — A9270 NON-COVERED ITEM OR SERVICE: HCPCS

## 2017-01-01 PROCEDURE — 76060000031 HC ANESTHESIA FIRST 0.5 HR: Performed by: INTERNAL MEDICINE

## 2017-01-01 PROCEDURE — 77012 CT SCAN FOR NEEDLE BIOPSY: CPT

## 2017-01-01 RX ORDER — POTASSIUM CHLORIDE 750 MG/1
40 TABLET, FILM COATED, EXTENDED RELEASE ORAL
Status: COMPLETED | OUTPATIENT
Start: 2017-01-01 | End: 2017-01-01

## 2017-01-01 RX ORDER — SODIUM CHLORIDE 9 MG/ML
INJECTION, SOLUTION INTRAVENOUS
Status: DISCONTINUED | OUTPATIENT
Start: 2017-01-01 | End: 2017-01-01 | Stop reason: HOSPADM

## 2017-01-01 RX ORDER — HYDROMORPHONE HYDROCHLORIDE 1 MG/ML
1 INJECTION, SOLUTION INTRAMUSCULAR; INTRAVENOUS; SUBCUTANEOUS
Status: DISCONTINUED | OUTPATIENT
Start: 2017-01-01 | End: 2017-01-01 | Stop reason: SDUPTHER

## 2017-01-01 RX ORDER — ONDANSETRON 4 MG/1
4 TABLET, ORALLY DISINTEGRATING ORAL
Status: DISCONTINUED | OUTPATIENT
Start: 2017-01-01 | End: 2017-01-01 | Stop reason: HOSPADM

## 2017-01-01 RX ORDER — SODIUM CHLORIDE 0.9 % (FLUSH) 0.9 %
5-10 SYRINGE (ML) INJECTION AS NEEDED
Status: DISCONTINUED | OUTPATIENT
Start: 2017-01-01 | End: 2017-01-01 | Stop reason: HOSPADM

## 2017-01-01 RX ORDER — METOCLOPRAMIDE HYDROCHLORIDE 5 MG/ML
10 INJECTION INTRAMUSCULAR; INTRAVENOUS ONCE
Status: COMPLETED | OUTPATIENT
Start: 2017-01-01 | End: 2017-01-01

## 2017-01-01 RX ORDER — FENTANYL CITRATE 50 UG/ML
200 INJECTION, SOLUTION INTRAMUSCULAR; INTRAVENOUS
Status: DISCONTINUED | OUTPATIENT
Start: 2017-01-01 | End: 2017-01-01

## 2017-01-01 RX ORDER — SODIUM CHLORIDE 0.9 % (FLUSH) 0.9 %
5-10 SYRINGE (ML) INJECTION EVERY 8 HOURS
Status: DISCONTINUED | OUTPATIENT
Start: 2017-01-01 | End: 2017-01-01 | Stop reason: HOSPADM

## 2017-01-01 RX ORDER — HEPARIN SODIUM 1000 [USP'U]/ML
INJECTION, SOLUTION INTRAVENOUS; SUBCUTANEOUS
Status: DISCONTINUED
Start: 2017-01-01 | End: 2017-01-01 | Stop reason: HOSPADM

## 2017-01-01 RX ORDER — POLYETHYLENE GLYCOL 3350 17 G/17G
17 POWDER, FOR SOLUTION ORAL
Status: DISCONTINUED | OUTPATIENT
Start: 2017-01-01 | End: 2017-01-01 | Stop reason: HOSPADM

## 2017-01-01 RX ORDER — SODIUM CHLORIDE 9 MG/ML
250 INJECTION, SOLUTION INTRAVENOUS AS NEEDED
Status: DISCONTINUED | OUTPATIENT
Start: 2017-01-01 | End: 2017-01-01 | Stop reason: HOSPADM

## 2017-01-01 RX ORDER — FACIAL-BODY WIPES
10 EACH TOPICAL DAILY PRN
Status: DISCONTINUED | OUTPATIENT
Start: 2017-01-01 | End: 2017-01-01 | Stop reason: HOSPADM

## 2017-01-01 RX ORDER — ATROPINE SULFATE 0.1 MG/ML
0.5 INJECTION INTRAVENOUS
Status: DISCONTINUED | OUTPATIENT
Start: 2017-01-01 | End: 2017-01-01 | Stop reason: HOSPADM

## 2017-01-01 RX ORDER — ONDANSETRON 4 MG/1
4 TABLET, ORALLY DISINTEGRATING ORAL
Qty: 30 TAB | Refills: 6 | Status: SHIPPED | OUTPATIENT
Start: 2017-01-01

## 2017-01-01 RX ORDER — ACETAMINOPHEN 650 MG/1
650 SUPPOSITORY RECTAL
Status: DISCONTINUED | OUTPATIENT
Start: 2017-01-01 | End: 2017-01-01 | Stop reason: HOSPADM

## 2017-01-01 RX ORDER — DIPHENHYDRAMINE HYDROCHLORIDE 50 MG/ML
12.5 INJECTION, SOLUTION INTRAMUSCULAR; INTRAVENOUS ONCE
Status: COMPLETED | OUTPATIENT
Start: 2017-01-01 | End: 2017-01-01

## 2017-01-01 RX ORDER — IBUPROFEN 800 MG/1
TABLET ORAL
Qty: 180 TAB | Refills: 12 | Status: SHIPPED | OUTPATIENT
Start: 2017-01-01 | End: 2017-01-01

## 2017-01-01 RX ORDER — DEXTROMETHORPHAN/PSEUDOEPHED 2.5-7.5/.8
1.2 DROPS ORAL
Status: DISCONTINUED | OUTPATIENT
Start: 2017-01-01 | End: 2017-01-01 | Stop reason: HOSPADM

## 2017-01-01 RX ORDER — OXYCODONE HCL 10 MG/1
10 TABLET, FILM COATED, EXTENDED RELEASE ORAL EVERY 12 HOURS
Qty: 60 TAB | Refills: 0 | Status: SHIPPED | OUTPATIENT
Start: 2017-01-01 | End: 2017-01-01

## 2017-01-01 RX ORDER — HYDROCHLOROTHIAZIDE 25 MG/1
25 TABLET ORAL DAILY
Status: DISCONTINUED | OUTPATIENT
Start: 2017-01-01 | End: 2017-01-01 | Stop reason: HOSPADM

## 2017-01-01 RX ORDER — SENNOSIDES 8.6 MG/1
1 TABLET ORAL 2 TIMES DAILY
Status: DISCONTINUED | OUTPATIENT
Start: 2017-01-01 | End: 2017-01-01 | Stop reason: HOSPADM

## 2017-01-01 RX ORDER — MORPHINE SULFATE 20 MG/ML
5 SOLUTION ORAL EVERY 4 HOURS
Status: DISCONTINUED | OUTPATIENT
Start: 2017-01-01 | End: 2017-01-01 | Stop reason: HOSPADM

## 2017-01-01 RX ORDER — MIDAZOLAM HYDROCHLORIDE 1 MG/ML
5 INJECTION, SOLUTION INTRAMUSCULAR; INTRAVENOUS
Status: DISCONTINUED | OUTPATIENT
Start: 2017-01-01 | End: 2017-01-01

## 2017-01-01 RX ORDER — SODIUM CHLORIDE 0.9 % (FLUSH) 0.9 %
10 SYRINGE (ML) INJECTION
Status: COMPLETED | OUTPATIENT
Start: 2017-01-01 | End: 2017-01-01

## 2017-01-01 RX ORDER — MIDAZOLAM HYDROCHLORIDE 1 MG/ML
.25-5 INJECTION, SOLUTION INTRAMUSCULAR; INTRAVENOUS
Status: DISCONTINUED | OUTPATIENT
Start: 2017-01-01 | End: 2017-01-01 | Stop reason: HOSPADM

## 2017-01-01 RX ORDER — OXYCODONE HYDROCHLORIDE 5 MG/1
5 TABLET ORAL
Qty: 30 TAB | Refills: 0 | Status: SHIPPED | OUTPATIENT
Start: 2017-01-01

## 2017-01-01 RX ORDER — EPINEPHRINE 0.1 MG/ML
1 INJECTION INTRACARDIAC; INTRAVENOUS
Status: DISCONTINUED | OUTPATIENT
Start: 2017-01-01 | End: 2017-01-01 | Stop reason: HOSPADM

## 2017-01-01 RX ORDER — ONDANSETRON 2 MG/ML
4 INJECTION INTRAMUSCULAR; INTRAVENOUS
Status: DISCONTINUED | OUTPATIENT
Start: 2017-01-01 | End: 2017-01-01 | Stop reason: HOSPADM

## 2017-01-01 RX ORDER — LEVOFLOXACIN 5 MG/ML
750 INJECTION, SOLUTION INTRAVENOUS
Status: DISCONTINUED | OUTPATIENT
Start: 2017-01-01 | End: 2017-01-01

## 2017-01-01 RX ORDER — ONDANSETRON 2 MG/ML
INJECTION INTRAMUSCULAR; INTRAVENOUS
Status: COMPLETED
Start: 2017-01-01 | End: 2017-01-01

## 2017-01-01 RX ORDER — MECLIZINE HYDROCHLORIDE 25 MG/1
25 TABLET ORAL
Qty: 15 TAB | Refills: 0 | Status: SHIPPED | OUTPATIENT
Start: 2017-01-01 | End: 2017-01-01

## 2017-01-01 RX ORDER — FLUMAZENIL 0.1 MG/ML
0.2 INJECTION INTRAVENOUS
Status: DISCONTINUED | OUTPATIENT
Start: 2017-01-01 | End: 2017-01-01 | Stop reason: HOSPADM

## 2017-01-01 RX ORDER — APIXABAN 5 MG/1
TABLET, FILM COATED ORAL
Refills: 0 | COMMUNITY
Start: 2017-01-01 | End: 2017-01-01

## 2017-01-01 RX ORDER — OXYCODONE HYDROCHLORIDE 5 MG/1
5 TABLET ORAL
Status: DISCONTINUED | OUTPATIENT
Start: 2017-01-01 | End: 2017-01-01 | Stop reason: HOSPADM

## 2017-01-01 RX ORDER — HYDROCHLOROTHIAZIDE 25 MG/1
TABLET ORAL
Qty: 90 TAB | Refills: 11 | Status: SHIPPED | OUTPATIENT
Start: 2017-01-01 | End: 2017-01-01 | Stop reason: ALTCHOICE

## 2017-01-01 RX ORDER — ENOXAPARIN SODIUM 100 MG/ML
1 INJECTION SUBCUTANEOUS EVERY 12 HOURS
Qty: 60 SYRINGE | Refills: 2 | Status: SHIPPED | OUTPATIENT
Start: 2017-01-01

## 2017-01-01 RX ORDER — SODIUM CHLORIDE 9 MG/ML
125 INJECTION, SOLUTION INTRAVENOUS CONTINUOUS
Status: DISPENSED | OUTPATIENT
Start: 2017-01-01 | End: 2017-01-01

## 2017-01-01 RX ORDER — LORAZEPAM 2 MG/ML
1 CONCENTRATE ORAL
Status: DISCONTINUED | OUTPATIENT
Start: 2017-01-01 | End: 2017-01-01

## 2017-01-01 RX ORDER — ASPIRIN 81 MG/1
81 TABLET ORAL DAILY
COMMUNITY
End: 2017-01-01

## 2017-01-01 RX ORDER — PROPOFOL 10 MG/ML
INJECTION, EMULSION INTRAVENOUS AS NEEDED
Status: DISCONTINUED | OUTPATIENT
Start: 2017-01-01 | End: 2017-01-01 | Stop reason: HOSPADM

## 2017-01-01 RX ORDER — SODIUM CHLORIDE 9 MG/ML
25 INJECTION, SOLUTION INTRAVENOUS CONTINUOUS
Status: DISCONTINUED | OUTPATIENT
Start: 2017-01-01 | End: 2017-01-01

## 2017-01-01 RX ORDER — ENOXAPARIN SODIUM 100 MG/ML
1 INJECTION SUBCUTANEOUS EVERY 12 HOURS
Status: DISCONTINUED | OUTPATIENT
Start: 2017-01-01 | End: 2017-01-01

## 2017-01-01 RX ORDER — METRONIDAZOLE 250 MG/1
500 TABLET ORAL 3 TIMES DAILY
Status: DISCONTINUED | OUTPATIENT
Start: 2017-01-01 | End: 2017-01-01

## 2017-01-01 RX ORDER — HYDROCHLOROTHIAZIDE 25 MG/1
TABLET ORAL
Qty: 90 TAB | Refills: 11 | Status: SHIPPED | OUTPATIENT
Start: 2017-01-01 | End: 2017-01-01

## 2017-01-01 RX ORDER — FENTANYL CITRATE 50 UG/ML
100 INJECTION, SOLUTION INTRAMUSCULAR; INTRAVENOUS
Status: DISCONTINUED | OUTPATIENT
Start: 2017-01-01 | End: 2017-01-01 | Stop reason: HOSPADM

## 2017-01-01 RX ORDER — SCOLOPAMINE TRANSDERMAL SYSTEM 1 MG/1
1.5 PATCH, EXTENDED RELEASE TRANSDERMAL
Status: DISCONTINUED | OUTPATIENT
Start: 2017-01-01 | End: 2017-01-01 | Stop reason: HOSPADM

## 2017-01-01 RX ORDER — NALOXONE HYDROCHLORIDE 0.4 MG/ML
0.4 INJECTION, SOLUTION INTRAMUSCULAR; INTRAVENOUS; SUBCUTANEOUS
Status: DISCONTINUED | OUTPATIENT
Start: 2017-01-01 | End: 2017-01-01 | Stop reason: HOSPADM

## 2017-01-01 RX ORDER — OXYCODONE HCL 10 MG/1
10 TABLET, FILM COATED, EXTENDED RELEASE ORAL EVERY 12 HOURS
Status: DISCONTINUED | OUTPATIENT
Start: 2017-01-01 | End: 2017-01-01 | Stop reason: HOSPADM

## 2017-01-01 RX ORDER — ACETAMINOPHEN 325 MG/1
650 TABLET ORAL
Status: DISCONTINUED | OUTPATIENT
Start: 2017-01-01 | End: 2017-01-01 | Stop reason: HOSPADM

## 2017-01-01 RX ORDER — ENOXAPARIN SODIUM 100 MG/ML
40 INJECTION SUBCUTANEOUS EVERY 24 HOURS
Status: DISCONTINUED | OUTPATIENT
Start: 2017-01-01 | End: 2017-01-01 | Stop reason: HOSPADM

## 2017-01-01 RX ORDER — ACETAMINOPHEN 10 MG/ML
1000 INJECTION, SOLUTION INTRAVENOUS ONCE
Status: COMPLETED | OUTPATIENT
Start: 2017-01-01 | End: 2017-01-01

## 2017-01-01 RX ORDER — OXYCODONE AND ACETAMINOPHEN 5; 325 MG/1; MG/1
1 TABLET ORAL
Qty: 30 TAB | Refills: 0 | Status: SHIPPED | OUTPATIENT
Start: 2017-01-01 | End: 2017-01-01

## 2017-01-01 RX ORDER — SODIUM CHLORIDE 0.9 % (FLUSH) 0.9 %
10 SYRINGE (ML) INJECTION
Status: DISPENSED | OUTPATIENT
Start: 2017-01-01 | End: 2017-01-01

## 2017-01-01 RX ORDER — IBUPROFEN 800 MG/1
800 TABLET ORAL
Qty: 60 TAB | Refills: 12 | Status: SHIPPED | OUTPATIENT
Start: 2017-01-01 | End: 2017-01-01 | Stop reason: SDUPTHER

## 2017-01-01 RX ORDER — SODIUM CHLORIDE 9 MG/ML
50 INJECTION, SOLUTION INTRAVENOUS CONTINUOUS
Status: DISCONTINUED | OUTPATIENT
Start: 2017-01-01 | End: 2017-01-01 | Stop reason: HOSPADM

## 2017-01-01 RX ORDER — ENOXAPARIN SODIUM 100 MG/ML
1 INJECTION SUBCUTANEOUS EVERY 12 HOURS
Status: DISCONTINUED | OUTPATIENT
Start: 2017-01-01 | End: 2017-01-01 | Stop reason: HOSPADM

## 2017-01-01 RX ORDER — MORPHINE SULFATE 20 MG/ML
10 SOLUTION ORAL
Status: DISCONTINUED | OUTPATIENT
Start: 2017-01-01 | End: 2017-01-01 | Stop reason: HOSPADM

## 2017-01-01 RX ORDER — HYOSCYAMINE SULFATE 0.12 MG/ML
125 LIQUID ORAL
Status: DISCONTINUED | OUTPATIENT
Start: 2017-01-01 | End: 2017-01-01 | Stop reason: HOSPADM

## 2017-01-01 RX ORDER — ASPIRIN 81 MG/1
81 TABLET ORAL DAILY
Status: DISCONTINUED | OUTPATIENT
Start: 2017-01-01 | End: 2017-01-01 | Stop reason: HOSPADM

## 2017-01-01 RX ORDER — HALOPERIDOL 2 MG/ML
0.5 SOLUTION ORAL
Status: DISCONTINUED | OUTPATIENT
Start: 2017-01-01 | End: 2017-01-01 | Stop reason: HOSPADM

## 2017-01-01 RX ORDER — PREDNISONE 10 MG/1
TABLET ORAL
Qty: 21 TAB | Refills: 0 | Status: SHIPPED | OUTPATIENT
Start: 2017-01-01 | End: 2017-01-01

## 2017-01-01 RX ORDER — SODIUM CHLORIDE 9 MG/ML
100 INJECTION, SOLUTION INTRAVENOUS CONTINUOUS
Status: DISCONTINUED | OUTPATIENT
Start: 2017-01-01 | End: 2017-01-01 | Stop reason: HOSPADM

## 2017-01-01 RX ORDER — THERA TABS 400 MCG
1 TAB ORAL DAILY
Status: DISCONTINUED | OUTPATIENT
Start: 2017-01-01 | End: 2017-01-01 | Stop reason: HOSPADM

## 2017-01-01 RX ORDER — HYDROMORPHONE HYDROCHLORIDE 1 MG/ML
1 INJECTION, SOLUTION INTRAMUSCULAR; INTRAVENOUS; SUBCUTANEOUS
Status: DISCONTINUED | OUTPATIENT
Start: 2017-01-01 | End: 2017-01-01 | Stop reason: HOSPADM

## 2017-01-01 RX ORDER — OXYCODONE AND ACETAMINOPHEN 5; 325 MG/1; MG/1
1 TABLET ORAL
Qty: 180 TAB | Refills: 0 | Status: SHIPPED | OUTPATIENT
Start: 2017-01-01 | End: 2017-01-01

## 2017-01-01 RX ORDER — SODIUM CHLORIDE 0.9 % (FLUSH) 0.9 %
SYRINGE (ML) INJECTION
Status: COMPLETED
Start: 2017-01-01 | End: 2017-01-01

## 2017-01-01 RX ORDER — HYDROCHLOROTHIAZIDE 25 MG/1
TABLET ORAL
Qty: 30 TAB | Refills: 0 | Status: SHIPPED | OUTPATIENT
Start: 2017-01-01 | End: 2017-01-01

## 2017-01-01 RX ORDER — OXYCODONE HYDROCHLORIDE 5 MG/1
5 TABLET ORAL
Status: DISCONTINUED | OUTPATIENT
Start: 2017-01-01 | End: 2017-01-01

## 2017-01-01 RX ORDER — OXYCODONE AND ACETAMINOPHEN 5; 325 MG/1; MG/1
1 TABLET ORAL
Status: DISCONTINUED | OUTPATIENT
Start: 2017-01-01 | End: 2017-01-01 | Stop reason: HOSPADM

## 2017-01-01 RX ORDER — ONDANSETRON 2 MG/ML
4 INJECTION INTRAMUSCULAR; INTRAVENOUS
Status: COMPLETED | OUTPATIENT
Start: 2017-01-01 | End: 2017-01-01

## 2017-01-01 RX ORDER — DICLOFENAC POTASSIUM 50 MG/1
50 TABLET, FILM COATED ORAL 3 TIMES DAILY
Qty: 30 TAB | Refills: 0 | Status: SHIPPED | OUTPATIENT
Start: 2017-01-01 | End: 2017-01-01

## 2017-01-01 RX ORDER — ENOXAPARIN SODIUM 100 MG/ML
40 INJECTION SUBCUTANEOUS EVERY 24 HOURS
Status: DISCONTINUED | OUTPATIENT
Start: 2017-01-01 | End: 2017-01-01

## 2017-01-01 RX ORDER — HEPARIN SODIUM 1000 [USP'U]/ML
5000 INJECTION, SOLUTION INTRAVENOUS; SUBCUTANEOUS
Status: DISCONTINUED | OUTPATIENT
Start: 2017-01-01 | End: 2017-01-01 | Stop reason: HOSPADM

## 2017-01-01 RX ADMIN — IOHEXOL 50 ML: 240 INJECTION, SOLUTION INTRATHECAL; INTRAVASCULAR; INTRAVENOUS; ORAL at 11:00

## 2017-01-01 RX ADMIN — Medication 10 ML: at 22:16

## 2017-01-01 RX ADMIN — METOCLOPRAMIDE 10 MG: 5 INJECTION, SOLUTION INTRAMUSCULAR; INTRAVENOUS at 15:32

## 2017-01-01 RX ADMIN — THERA TABS 1 TABLET: TAB at 09:25

## 2017-01-01 RX ADMIN — SODIUM CHLORIDE 1000 ML: 900 INJECTION, SOLUTION INTRAVENOUS at 15:22

## 2017-01-01 RX ADMIN — Medication 10 ML: at 15:06

## 2017-01-01 RX ADMIN — Medication 10 ML: at 18:21

## 2017-01-01 RX ADMIN — ONDANSETRON 4 MG: 2 INJECTION INTRAMUSCULAR; INTRAVENOUS at 10:29

## 2017-01-01 RX ADMIN — IOPAMIDOL 100 ML: 755 INJECTION, SOLUTION INTRAVENOUS at 20:28

## 2017-01-01 RX ADMIN — OXYCODONE HYDROCHLORIDE AND ACETAMINOPHEN 1 TABLET: 5; 325 TABLET ORAL at 14:38

## 2017-01-01 RX ADMIN — MORPHINE SULFATE 5 MG: 20 SOLUTION ORAL at 20:58

## 2017-01-01 RX ADMIN — OXYCODONE HYDROCHLORIDE 10 MG: 10 TABLET, FILM COATED, EXTENDED RELEASE ORAL at 10:14

## 2017-01-01 RX ADMIN — ASPIRIN 81 MG: 81 TABLET, COATED ORAL at 08:50

## 2017-01-01 RX ADMIN — PIPERACILLIN SODIUM,TAZOBACTAM SODIUM 3.38 G: 3; .375 INJECTION, POWDER, FOR SOLUTION INTRAVENOUS at 21:45

## 2017-01-01 RX ADMIN — GADOTERIDOL 12 ML: 279.3 INJECTION, SOLUTION INTRAVENOUS at 21:39

## 2017-01-01 RX ADMIN — HALOPERIDOL LACTATE 0.5 MG: 2 SOLUTION, CONCENTRATE ORAL at 21:23

## 2017-01-01 RX ADMIN — OXYCODONE HYDROCHLORIDE 5 MG: 5 TABLET ORAL at 10:50

## 2017-01-01 RX ADMIN — FENTANYL CITRATE 25 MCG: 50 INJECTION, SOLUTION INTRAMUSCULAR; INTRAVENOUS at 16:01

## 2017-01-01 RX ADMIN — ACETAMINOPHEN 650 MG: 325 TABLET, FILM COATED ORAL at 16:58

## 2017-01-01 RX ADMIN — MORPHINE SULFATE 5 MG: 20 SOLUTION ORAL at 13:03

## 2017-01-01 RX ADMIN — ONDANSETRON 4 MG: 2 INJECTION INTRAMUSCULAR; INTRAVENOUS at 14:31

## 2017-01-01 RX ADMIN — Medication 10 ML: at 13:43

## 2017-01-01 RX ADMIN — Medication 10 ML: at 09:18

## 2017-01-01 RX ADMIN — METRONIDAZOLE 500 MG: 250 TABLET ORAL at 16:59

## 2017-01-01 RX ADMIN — Medication 10 ML: at 05:59

## 2017-01-01 RX ADMIN — ONDANSETRON 4 MG: 2 INJECTION INTRAMUSCULAR; INTRAVENOUS at 13:10

## 2017-01-01 RX ADMIN — ASPIRIN 81 MG: 81 TABLET, COATED ORAL at 08:27

## 2017-01-01 RX ADMIN — ACETAMINOPHEN 650 MG: 325 TABLET, FILM COATED ORAL at 01:27

## 2017-01-01 RX ADMIN — ASPIRIN 81 MG: 81 TABLET, COATED ORAL at 09:25

## 2017-01-01 RX ADMIN — ONDANSETRON 4 MG: 4 TABLET, ORALLY DISINTEGRATING ORAL at 14:25

## 2017-01-01 RX ADMIN — PIPERACILLIN SODIUM,TAZOBACTAM SODIUM 3.38 G: 3; .375 INJECTION, POWDER, FOR SOLUTION INTRAVENOUS at 09:15

## 2017-01-01 RX ADMIN — Medication 10 ML: at 05:20

## 2017-01-01 RX ADMIN — PIPERACILLIN SODIUM,TAZOBACTAM SODIUM 3.38 G: 3; .375 INJECTION, POWDER, FOR SOLUTION INTRAVENOUS at 05:59

## 2017-01-01 RX ADMIN — PIPERACILLIN SODIUM,TAZOBACTAM SODIUM 3.38 G: 3; .375 INJECTION, POWDER, FOR SOLUTION INTRAVENOUS at 21:34

## 2017-01-01 RX ADMIN — Medication 10 ML: at 22:00

## 2017-01-01 RX ADMIN — FENTANYL CITRATE 25 MCG: 50 INJECTION, SOLUTION INTRAMUSCULAR; INTRAVENOUS at 15:56

## 2017-01-01 RX ADMIN — MORPHINE SULFATE 10 MG: 20 SOLUTION ORAL at 02:00

## 2017-01-01 RX ADMIN — SODIUM CHLORIDE 100 ML/HR: 900 INJECTION, SOLUTION INTRAVENOUS at 13:28

## 2017-01-01 RX ADMIN — ENOXAPARIN SODIUM 60 MG: 60 INJECTION SUBCUTANEOUS at 07:28

## 2017-01-01 RX ADMIN — METRONIDAZOLE 500 MG: 250 TABLET ORAL at 21:42

## 2017-01-01 RX ADMIN — DIPHENHYDRAMINE HYDROCHLORIDE 12.5 MG: 50 INJECTION, SOLUTION INTRAMUSCULAR; INTRAVENOUS at 04:17

## 2017-01-01 RX ADMIN — ACETAMINOPHEN 1000 MG: 10 INJECTION, SOLUTION INTRAVENOUS at 04:14

## 2017-01-01 RX ADMIN — HYDROCHLOROTHIAZIDE 25 MG: 25 TABLET ORAL at 08:36

## 2017-01-01 RX ADMIN — MORPHINE SULFATE 5 MG: 20 SOLUTION ORAL at 17:26

## 2017-01-01 RX ADMIN — MORPHINE SULFATE 10 MG: 20 SOLUTION ORAL at 23:48

## 2017-01-01 RX ADMIN — Medication 10 ML: at 05:26

## 2017-01-01 RX ADMIN — ENOXAPARIN SODIUM 60 MG: 60 INJECTION SUBCUTANEOUS at 19:20

## 2017-01-01 RX ADMIN — ASPIRIN 81 MG: 81 TABLET, COATED ORAL at 08:36

## 2017-01-01 RX ADMIN — PIPERACILLIN SODIUM,TAZOBACTAM SODIUM 3.38 G: 3; .375 INJECTION, POWDER, FOR SOLUTION INTRAVENOUS at 13:42

## 2017-01-01 RX ADMIN — OXYCODONE HYDROCHLORIDE 5 MG: 5 TABLET ORAL at 05:59

## 2017-01-01 RX ADMIN — ENOXAPARIN SODIUM 40 MG: 40 INJECTION SUBCUTANEOUS at 21:34

## 2017-01-01 RX ADMIN — IOHEXOL 50 ML: 240 INJECTION, SOLUTION INTRATHECAL; INTRAVASCULAR; INTRAVENOUS; ORAL at 20:28

## 2017-01-01 RX ADMIN — PROPOFOL 30 MG: 10 INJECTION, EMULSION INTRAVENOUS at 09:09

## 2017-01-01 RX ADMIN — PROPOFOL 30 MG: 10 INJECTION, EMULSION INTRAVENOUS at 09:11

## 2017-01-01 RX ADMIN — PROPOFOL 30 MG: 10 INJECTION, EMULSION INTRAVENOUS at 09:07

## 2017-01-01 RX ADMIN — ASPIRIN 81 MG: 81 TABLET, COATED ORAL at 09:14

## 2017-01-01 RX ADMIN — HYDROMORPHONE HYDROCHLORIDE 1 MG: 1 INJECTION, SOLUTION INTRAMUSCULAR; INTRAVENOUS; SUBCUTANEOUS at 12:19

## 2017-01-01 RX ADMIN — OXYCODONE HYDROCHLORIDE AND ACETAMINOPHEN 1 TABLET: 5; 325 TABLET ORAL at 01:43

## 2017-01-01 RX ADMIN — PROCHLORPERAZINE EDISYLATE 5 MG: 5 INJECTION INTRAMUSCULAR; INTRAVENOUS at 14:54

## 2017-01-01 RX ADMIN — Medication 10 ML: at 20:54

## 2017-01-01 RX ADMIN — PROPOFOL 100 MG: 10 INJECTION, EMULSION INTRAVENOUS at 09:05

## 2017-01-01 RX ADMIN — HALOPERIDOL LACTATE 0.5 MG: 2 SOLUTION, CONCENTRATE ORAL at 13:03

## 2017-01-01 RX ADMIN — PIPERACILLIN SODIUM,TAZOBACTAM SODIUM 3.38 G: 3; .375 INJECTION, POWDER, FOR SOLUTION INTRAVENOUS at 14:29

## 2017-01-01 RX ADMIN — OXYCODONE HYDROCHLORIDE AND ACETAMINOPHEN 1 TABLET: 5; 325 TABLET ORAL at 02:30

## 2017-01-01 RX ADMIN — SODIUM CHLORIDE 125 ML/HR: 900 INJECTION, SOLUTION INTRAVENOUS at 18:21

## 2017-01-01 RX ADMIN — Medication 10 ML: at 21:15

## 2017-01-01 RX ADMIN — ASPIRIN 81 MG: 81 TABLET, COATED ORAL at 10:06

## 2017-01-01 RX ADMIN — Medication 10 ML: at 13:10

## 2017-01-01 RX ADMIN — Medication 10 ML: at 22:05

## 2017-01-01 RX ADMIN — SODIUM CHLORIDE: 9 INJECTION, SOLUTION INTRAVENOUS at 08:58

## 2017-01-01 RX ADMIN — HYDROMORPHONE HYDROCHLORIDE 1 MG: 1 INJECTION, SOLUTION INTRAMUSCULAR; INTRAVENOUS; SUBCUTANEOUS at 10:29

## 2017-01-01 RX ADMIN — LEVOFLOXACIN 750 MG: 500 TABLET, FILM COATED ORAL at 13:28

## 2017-01-01 RX ADMIN — ACETAMINOPHEN 650 MG: 325 TABLET, FILM COATED ORAL at 09:14

## 2017-01-01 RX ADMIN — Medication 10 ML: at 14:42

## 2017-01-01 RX ADMIN — PIPERACILLIN SODIUM,TAZOBACTAM SODIUM 3.38 G: 3; .375 INJECTION, POWDER, FOR SOLUTION INTRAVENOUS at 22:16

## 2017-01-01 RX ADMIN — Medication 10 ML: at 05:11

## 2017-01-01 RX ADMIN — PIPERACILLIN SODIUM,TAZOBACTAM SODIUM 3.38 G: 3; .375 INJECTION, POWDER, FOR SOLUTION INTRAVENOUS at 05:16

## 2017-01-01 RX ADMIN — SODIUM CHLORIDE 100 ML: 900 INJECTION, SOLUTION INTRAVENOUS at 20:28

## 2017-01-01 RX ADMIN — ENOXAPARIN SODIUM 60 MG: 60 INJECTION SUBCUTANEOUS at 08:03

## 2017-01-01 RX ADMIN — Medication 10 ML: at 13:29

## 2017-01-01 RX ADMIN — LEVOFLOXACIN 750 MG: 500 TABLET, FILM COATED ORAL at 12:32

## 2017-01-01 RX ADMIN — ENOXAPARIN SODIUM 40 MG: 40 INJECTION SUBCUTANEOUS at 20:53

## 2017-01-01 RX ADMIN — MORPHINE SULFATE 5 MG: 20 SOLUTION ORAL at 00:43

## 2017-01-01 RX ADMIN — Medication 10 ML: at 12:33

## 2017-01-01 RX ADMIN — Medication 10 ML: at 14:21

## 2017-01-01 RX ADMIN — OXYCODONE HYDROCHLORIDE 5 MG: 5 TABLET ORAL at 23:44

## 2017-01-01 RX ADMIN — Medication 10 ML: at 14:32

## 2017-01-01 RX ADMIN — MORPHINE SULFATE 10 MG: 20 SOLUTION ORAL at 19:47

## 2017-01-01 RX ADMIN — THERA TABS 1 TABLET: TAB at 08:36

## 2017-01-01 RX ADMIN — SODIUM CHLORIDE 100 ML: 900 INJECTION, SOLUTION INTRAVENOUS at 11:00

## 2017-01-01 RX ADMIN — HYDROCHLOROTHIAZIDE 25 MG: 25 TABLET ORAL at 09:24

## 2017-01-01 RX ADMIN — Medication 10 ML: at 21:35

## 2017-01-01 RX ADMIN — Medication 10 ML: at 05:27

## 2017-01-01 RX ADMIN — OXYCODONE HYDROCHLORIDE 5 MG: 5 TABLET ORAL at 23:51

## 2017-01-01 RX ADMIN — Medication 10 ML: at 14:16

## 2017-01-01 RX ADMIN — OXYCODONE HYDROCHLORIDE 5 MG: 5 TABLET ORAL at 00:12

## 2017-01-01 RX ADMIN — POTASSIUM CHLORIDE 40 MEQ: 750 TABLET, FILM COATED, EXTENDED RELEASE ORAL at 10:06

## 2017-01-01 RX ADMIN — SODIUM CHLORIDE 50 ML/HR: 900 INJECTION, SOLUTION INTRAVENOUS at 08:52

## 2017-01-01 RX ADMIN — POTASSIUM CHLORIDE 40 MEQ: 750 TABLET, FILM COATED, EXTENDED RELEASE ORAL at 22:32

## 2017-01-01 RX ADMIN — IOPAMIDOL 100 ML: 755 INJECTION, SOLUTION INTRAVENOUS at 11:00

## 2017-01-01 RX ADMIN — FAMOTIDINE 20 MG: 10 INJECTION, SOLUTION INTRAVENOUS at 04:18

## 2017-01-01 RX ADMIN — GADOTERIDOL 13 ML: 279.3 INJECTION, SOLUTION INTRAVENOUS at 12:00

## 2017-01-01 RX ADMIN — MORPHINE SULFATE 10 MG: 20 SOLUTION ORAL at 22:33

## 2017-01-01 RX ADMIN — ENOXAPARIN SODIUM 40 MG: 40 INJECTION SUBCUTANEOUS at 14:31

## 2017-01-01 RX ADMIN — ENOXAPARIN SODIUM 60 MG: 60 INJECTION SUBCUTANEOUS at 17:32

## 2017-01-01 RX ADMIN — SIMETHICONE 80 MG: 20 SUSPENSION/ DROPS ORAL at 09:15

## 2017-01-01 RX ADMIN — LEVOFLOXACIN 750 MG: 500 TABLET, FILM COATED ORAL at 11:35

## 2017-01-01 RX ADMIN — SODIUM CHLORIDE 100 ML/HR: 900 INJECTION, SOLUTION INTRAVENOUS at 05:22

## 2017-01-01 RX ADMIN — Medication 10 ML: at 05:16

## 2017-01-01 RX ADMIN — LEVOFLOXACIN 750 MG: 500 TABLET, FILM COATED ORAL at 11:42

## 2017-01-01 RX ADMIN — POLYETHYLENE GLYCOL 3350 17 G: 17 POWDER, FOR SOLUTION ORAL at 10:23

## 2017-01-01 RX ADMIN — OXYCODONE HYDROCHLORIDE 5 MG: 5 TABLET ORAL at 09:15

## 2017-01-01 RX ADMIN — Medication 10 ML: at 21:39

## 2017-01-01 RX ADMIN — SODIUM CHLORIDE 25 ML/HR: 900 INJECTION, SOLUTION INTRAVENOUS at 15:49

## 2017-01-01 RX ADMIN — SODIUM CHLORIDE 125 ML/HR: 900 INJECTION, SOLUTION INTRAVENOUS at 01:28

## 2017-01-01 RX ADMIN — PROPOFOL 40 MG: 10 INJECTION, EMULSION INTRAVENOUS at 09:13

## 2017-01-01 RX ADMIN — SODIUM CHLORIDE 1000 ML: 900 INJECTION, SOLUTION INTRAVENOUS at 13:48

## 2017-01-01 RX ADMIN — ENOXAPARIN SODIUM 60 MG: 60 INJECTION SUBCUTANEOUS at 19:23

## 2017-01-01 RX ADMIN — PIPERACILLIN SODIUM,TAZOBACTAM SODIUM 3.38 G: 3; .375 INJECTION, POWDER, FOR SOLUTION INTRAVENOUS at 15:05

## 2017-01-01 RX ADMIN — Medication 10 ML: at 21:46

## 2017-01-26 NOTE — TELEPHONE ENCOUNTER
Patient called after speaking to her pcp. Patient has not seen Dr. Ty Lucas since 2013. PCP stated that she should talk to Dr. Ty Lucas and see him about having a mammogram or breast mri. Patient states that she had a double mastectomy. Spoke with Sesar Jarquin, who stated that a mammogram is not needed and a mri is only needed if there is a problem    Relayed message to patient. Thankful for the informaion.

## 2017-03-03 NOTE — UC PROVIDER NOTE
Patient is a 77 y.o. female presenting with dizziness. The history is provided by the patient. Dizziness   This is a new (She is currently without symptoms since the last bout at 0330 that got better at rest.  She denied any speech difficulties, HA, NV, vision problems, hearing problems, or weakness. Only room spinning that got better w decreased head movement. NO symp now) problem. Episode onset: first time was three weeks ago when waking in themiddle of the night -this went away after a few minutes. This AM, woke at 0030 and had dizziness with walking and room was spinning. Got unique while laying down. Then up again at 0330 and similar symptoms. There was no focality noted. Primary symptoms include loss of balance. Pertinent negatives include no focal weakness, no loss of sensation, no slurred speech, no speech difficulty, no memory loss, no movement disorder, no agitation, no visual change, no auditory change, no mental status change, no unresponsiveness and no disorientation. There has been no fever. Pertinent negatives include no shortness of breath, no chest pain, no vomiting, no altered mental status, no confusion, no headaches, no choking, no nausea, no bowel incontinence and no bladder incontinence. Associated medical issues do not include trauma, mood changes, bleeding disorder, seizures, dementia, CVA or clotting disorder.         Past Medical History:   Diagnosis Date    Anxiety and depression     Breast CA ([facility] 2012    Left    Breast cancer ([facility] 8/2012    Breast cancer     GERD (gastroesophageal reflux disease)     not an issure, no medications    Hypertension     Other ill-defined conditions(799.89)     last chemo treatment 1/30/13    Sickle cell trait ([facility]         Past Surgical History:   Procedure Laterality Date    ABDOMEN SURGERY PROC UNLISTED      hernia as child    BREAST SURGERY PROCEDURE UNLISTED  10/11/12    LEFT SLNB and port-a-cath insertion    BREAST SURGERY PROCEDURE UNLISTED      bilateral mastectomy with reconstruction- states not arm restricted    HX BREAST RECONSTRUCTION  7/1/2013    BREAST TISSUE EXPANDER REMOVAL performed by Suma Motta MD at [address] [facility] [address] HX LYMPHADENECTOMY  October 2012    sentinel node biopsy, negative    HX ORTHOPAEDIC      hammer toe surgery    HX VASCULAR ACCESS      insertion and removal    HX WISDOM TEETH EXTRACTION           Family History   Problem Relation Age of Onset    Cancer Mother      colon cancer    Heart Disease Father     Diabetes Father     Cancer Sister 27     breast cancer    MS Sister     Cancer Other 36     breast cancer    Cancer Maternal Grandfather      MOUTH/THROAT    Cancer Paternal Grandmother      BRAIN    Hypertension Sister     Sickle Cell Anemia Brother     Malignant Hyperthermia Neg Hx     Pseudocholinesterase Deficiency Neg Hx     Delayed Awakening Neg Hx     Post-op Nausea/Vomiting Neg Hx     Emergence Delirium Neg Hx     Post-op Cognitive Dysfunction Neg Hx     Other Neg Hx         Social History     Social History    Marital status:      Spouse name: N/A    Number of children: N/A    Years of education: N/A     Occupational History    Not on file. Social History Main Topics    Smoking status: Never Smoker    Smokeless tobacco: Never Used    Alcohol use 6.0 oz/week     12 Standard drinks or equivalent per week      Comment: SOCIAL    Drug use: Yes     Special: Marijuana      Comment: 7 MONTHS AGO    Sexual activity: Not on file     Other Topics Concern    Not on file     Social History Narrative                ALLERGIES: Codeine    Review of Systems   Constitutional: Negative. HENT: Negative. Eyes: Negative. Respiratory: Negative. Negative for choking and shortness of breath. Cardiovascular: Negative. Negative for chest pain. Gastrointestinal: Negative for bowel incontinence, nausea and vomiting.    Genitourinary: Negative for bladder incontinence. Neurological: Positive for dizziness and loss of balance. Negative for focal weakness, speech difficulty, light-headedness, numbness and headaches. Psychiatric/Behavioral: Negative for agitation, confusion and memory loss. Vitals:    03/03/17 1539   BP: 140/71   Pulse: 93   Resp: 18   Temp: 97.8 °F (36.6 °C)   SpO2: 100%   Weight: 64.9 kg (143 lb)   Height: 5' 8\" (1.727 m)       Physical Exam   Constitutional: She is oriented to person, place, and time. She appears well-developed and well-nourished. HENT:   Head: Normocephalic and atraumatic. Right Ear: External ear normal.   Left Ear: External ear normal.   Mouth/Throat: Oropharynx is clear and moist. No oropharyngeal exudate. Soft palate elevates symetricaly, tongue midline   Eyes: Conjunctivae and EOM are normal. Pupils are equal, round, and reactive to light. Two beat left lateral gaze nystagmus   Neck: Normal range of motion. No tracheal deviation present. No thyromegaly present. No carotid bruits   Cardiovascular: Normal rate, regular rhythm and normal heart sounds. No murmur heard. Pulmonary/Chest: Effort normal and breath sounds normal. No respiratory distress. She has no wheezes. She has no rales. She exhibits no tenderness. Abdominal: Soft. Bowel sounds are normal. She exhibits no distension. There is no tenderness. There is no rebound and no guarding. Musculoskeletal: Normal range of motion. She exhibits no edema or tenderness. Lymphadenopathy:     She has no cervical adenopathy. Neurological: She is alert and oriented to person, place, and time. She has normal reflexes. She displays normal reflexes. No cranial nerve deficit. She exhibits normal muscle tone. Coordination normal.   Skin: Skin is warm. No erythema. Psychiatric: She has a normal mood and affect. Her behavior is normal.   Nursing note and vitals reviewed.       MDM     Differential Diagnosis; Clinical Impression; Plan:     As we discussed, your symptoms could either be consistent with a TIA (ministroke) that is resolved or vertigo. If you have symptoms again, call 911 and be seen in the hospital.  If you are not having difficulty swallowing, it is reasonable to try a meclizine tablet while getting to the Emergency department. CLINICAL IMPRESSION:  Dizziness  (primary encounter diagnosis)  resolved  Plan:  1. ED eval for any recurrence of symptoms  2. Close PCP fu  3. Meclizine to try before ED eval if needed    Amount and/or Complexity of Data Reviewed:   Clinical lab tests:  Ordered and reviewed  Risk of Significant Complications, Morbidity, and/or Mortality:   Presenting problems: Moderate  Management options:   Moderate  Progress:   Patient progress:  Stable      Procedures

## 2017-03-03 NOTE — DISCHARGE INSTRUCTIONS
Dizziness: Care Instructions  Your Care Instructions  Dizziness is the feeling of unsteadiness or fuzziness in your head. It is different than having vertigo, which is a feeling that the room is spinning or that you are moving or falling. It is also different from lightheadedness, which is the feeling that you are about to faint. It can be hard to know what causes dizziness. Some people feel dizzy when they have migraine headaches. Sometimes bouts of flu can make you feel dizzy. Some medical conditions, such as heart problems or high blood pressure, can make you feel dizzy. Many medicines can cause dizziness, including medicines for high blood pressure, pain, or anxiety. If a medicine causes your symptoms, your doctor may recommend that you stop or change the medicine. If it is a problem with your heart, you may need medicine to help your heart work better. If there is no clear reason for your symptoms, your doctor may suggest watching and waiting for a while to see if the dizziness goes away on its own. Follow-up care is a key part of your treatment and safety. Be sure to make and go to all appointments, and call your doctor if you are having problems. It's also a good idea to know your test results and keep a list of the medicines you take. How can you care for yourself at home? · If your doctor recommends or prescribes medicine, take it exactly as directed. Call your doctor if you think you are having a problem with your medicine. · Do not drive while you feel dizzy. · Try to prevent falls. Steps you can take include:  ¨ Using nonskid mats, adding grab bars near the tub, and using night-lights. ¨ Clearing your home so that walkways are free of anything you might trip on. ¨ Letting family and friends know that you have been feeling dizzy. This will help them know how to help you. When should you call for help? Call 911 anytime you think you may need emergency care.  For example, call if:  · You passed out (lost consciousness). · You have dizziness along with symptoms of a heart attack. These may include:  ¨ Chest pain or pressure, or a strange feeling in the chest.  ¨ Sweating. ¨ Shortness of breath. ¨ Nausea or vomiting. ¨ Pain, pressure, or a strange feeling in the back, neck, jaw, or upper belly or in one or both shoulders or arms. ¨ Lightheadedness or sudden weakness. ¨ A fast or irregular heartbeat. · You have symptoms of a stroke. These may include:  ¨ Sudden numbness, tingling, weakness, or loss of movement in your face, arm, or leg, especially on only one side of your body. ¨ Sudden vision changes. ¨ Sudden trouble speaking. ¨ Sudden confusion or trouble understanding simple statements. ¨ Sudden problems with walking or balance. ¨ A sudden, severe headache that is different from past headaches. Call your doctor now or seek immediate medical care if:  · You feel dizzy and have a fever, headache, or ringing in your ears. · You have new or increased nausea and vomiting. · Your dizziness does not go away or comes back. Watch closely for changes in your health, and be sure to contact your doctor if:  · You do not get better as expected. Where can you learn more? Go to http://anne-chris.info/. Enter C184 in the search box to learn more about \"Dizziness: Care Instructions. \"  Current as of: May 27, 2016  Content Version: 11.1  © 9196-1503 [employer]. Care instructions adapted under license by [employer] (which disclaims liability or warranty for this information). If you have questions about a medical condition or this instruction, always ask your healthcare professional. [place] [address] any warranty or liability for your use of this information. Vertigo: Care Instructions  Your Care Instructions  Vertigo is the feeling that you or your surroundings are moving when there is no actual movement.  It is often described as a feeling of spinning, whirling, falling, or tilting. Vertigo may make you vomit or feel nauseated. You may have trouble standing or walking and may lose your balance. Vertigo is often related to an inner ear problem, but it can have other more serious causes. If vertigo continues, you may need more tests to find its cause. Follow-up care is a key part of your treatment and safety. Be sure to make and go to all appointments, and call your doctor if you are having problems. Its also a good idea to know your test results and keep a list of the medicines you take. How can you care for yourself at home? · Do not lie flat on your back. Prop yourself up slightly. This may reduce the spinning feeling. Keep your eyes open. · Move slowly so that you do not fall. · If your doctor recommends medicine, take it exactly as directed. · Do not drive while you are having vertigo. Certain exercises, called Morris-Daroff exercises, can help decrease vertigo. To do Morris-Daroff exercises:  · Sit on the edge of a bed or sofa and quickly lie down on the side that causes the worst vertigo. Lie on your side with your ear down. · Stay in this position for at least 30 seconds or until the vertigo goes away. · Sit up. If this causes vertigo, wait for it to stop. · Repeat the procedure on the other side. · Repeat this 10 times. Do these exercises 2 times a day until the vertigo is gone. When should you call for help? Call 911 anytime you think you may need emergency care. For example, call if:  · You passed out (lost consciousness). · You have symptoms of a stroke. These may include:  ¨ Sudden numbness, tingling, weakness, or loss of movement in your face, arm, or leg, especially on only one side of your body. ¨ Sudden vision changes. ¨ Sudden trouble speaking. ¨ Sudden confusion or trouble understanding simple statements. ¨ Sudden problems with walking or balance.   ¨ A sudden, severe headache that is different from past headaches. Call your doctor now or seek immediate medical care if:  · Vertigo occurs with a fever, a headache, or ringing in your ears. · You have new or increased nausea and vomiting. Watch closely for changes in your health, and be sure to contact your doctor if:  · Vertigo gets worse or happens more often. · Vertigo has not gotten better after 2 weeks. Where can you learn more? Go to http://anne-chris.info/. Enter G515 in the search box to learn more about \"Vertigo: Care Instructions. \"  Current as of: July 29, 2016  Content Version: 11.1  © 6544-6055 [employer]. Care instructions adapted under license by [employer] (which disclaims liability or warranty for this information). If you have questions about a medical condition or this instruction, always ask your healthcare professional. [address] any warranty or liability for your use of this information.

## 2017-04-05 NOTE — PROGRESS NOTES
This is an Initial Medicare Annual Wellness Exam (AWV) (Performed 12 months after IPPE or effective date of Medicare Part B enrollment, Once in a lifetime)    I have reviewed the patient's medical history in detail and updated the computerized patient record. Pt has had double mastectomy for breast CA in 2013. Last colonoscopy was over 10 yrs ago. No melena or hematochezia. Overdue for Dexa. Saw eye doc less than a yr ago. Lapsed on pneumonia shot. 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 She has moved in with her sister which has helped her mood. She feels she is better and isnt interested in medication or counseling. She has forced herself to be more social and more outgoing. protonix isnt working for GERD. C/o non- itchy rash on forearms x 1 wk. No sick contacts.  No h/o eczema         History     Past Medical History:   Diagnosis Date    Anxiety and depression     Breast CA ([facility] [address] 2012    Left    Breast cancer ([facility] [address] 8/2012    Breast cancer     GERD (gastroesophageal reflux disease)     not an issure, no medications    Hypertension     Other ill-defined conditions     last chemo treatment 1/30/13    Sickle cell trait ([facility] [address]       Past Surgical History:   Procedure Laterality Date    ABDOMEN SURGERY PROC UNLISTED      hernia as child    BREAST SURGERY PROCEDURE UNLISTED  10/11/12    LEFT SLNB and port-a-cath insertion    BREAST SURGERY PROCEDURE UNLISTED      bilateral mastectomy with reconstruction- states not arm restricted    HX BREAST RECONSTRUCTION  7/1/2013    BREAST TISSUE EXPANDER REMOVAL performed by Megan Sifuentes MD at [address] [facility] [address] HX LYMPHADENECTOMY  October 2012    sentinel node biopsy, negative    HX ORTHOPAEDIC      hammer toe surgery    HX VASCULAR ACCESS      insertion and removal    HX WISDOM TEETH EXTRACTION       Current Outpatient Prescriptions Medication Sig Dispense Refill    hydroCHLOROthiazide (HYDRODIURIL) 25 mg tablet TAKE 1 TABLET BY MOUTH DAILY AFTER BREAKFAST 90 Tab 11    pantoprazole (PROTONIX) 40 mg tablet Take 1 Tab by mouth daily. Indications: GASTROESOPHAGEAL REFLUX, HEARTBURN 30 Tab 11    multivitamin (ONE A DAY) tablet Take 1 Tab by mouth daily (after breakfast).        Allergies   Allergen Reactions    Codeine Nausea Only     Family History   Problem Relation Age of Onset    Cancer Mother      colon cancer    Heart Disease Father     Diabetes Father     Cancer Sister 27     breast cancer    MS Sister     Cancer Other 36     breast cancer    Cancer Maternal Grandfather      MOUTH/THROAT    Cancer Paternal Grandmother      BRAIN    Hypertension Sister     Sickle Cell Anemia Brother     Malignant Hyperthermia Neg Hx     Pseudocholinesterase Deficiency Neg Hx     Delayed Awakening Neg Hx     Post-op Nausea/Vomiting Neg Hx     Emergence Delirium Neg Hx     Post-op Cognitive Dysfunction Neg Hx     Other Neg Hx      Social History   Substance Use Topics    Smoking status: Never Smoker    Smokeless tobacco: Never Used    Alcohol use 6.0 oz/week     12 Standard drinks or equivalent per week      Comment: SOCIAL     Patient Active Problem List   Diagnosis Code    [facility] C50.919         Depression Risk Factor Screening:     PHQ 2 / 9, over the last two weeks 4/5/2017   Little interest or pleasure in doing things Nearly every day   Feeling down, depressed or hopeless Several days   Total Score PHQ 2 4   Trouble falling or staying asleep, or sleeping too much Not at all   Feeling tired or having little energy Nearly every day   Poor appetite or overeating Not at all   Feeling bad about yourself - or that you are a failure or have let yourself or your family down Not at all   Trouble concentrating on things such as school, work, reading or watching TV Not at all   Moving or speaking so slowly that other people could have noticed; or the opposite being so fidgety that others notice Not at all   Thoughts of being better off dead, or hurting yourself in some way Not at all   PHQ 9 Score 7   How difficult have these problems made it for you to do your work, take care of your home and get along with others Not difficult at all     Alcohol Risk Factor Screening: On any occasion during the past 3 months, have you had more than 3 drinks containing alcohol? No    Do you average more than 7 drinks per week? No    Functional Ability and Level of Safety:     Hearing Loss   mild    Activities of Daily Living   Self-care. Requires assistance with: no ADLs    Fall Risk   No flowsheet data found. Abuse Screen   Patient is not abused    Review of Systems   A comprehensive review of systems was negative except for that written in the HPI. Physical Examination     No exam data present    Evaluation of Cognitive Function:  Mood/affect:  happy  Appearance: age appropriate  Family member/caregiver input: none    Visit Vitals    /73 (BP 1 Location: Left arm, BP Patient Position: Sitting)    Pulse 66    Temp 97.8 °F (36.6 °C) (Oral)    Resp 20    Ht 5' 8\" (1.727 m)    Wt 145 lb (65.8 kg)    SpO2 100%    BMI 22.05 kg/m2     General:  Alert, cooperative, no distress, appears stated age. Head:  Normocephalic, without obvious abnormality, atraumatic. Eyes:  Conjunctivae/corneas clear. PERRL, EOMs intact. Fundi benign. Ears:  Normal TMs and external ear canals both ears. Nose: Nares normal. Septum midline. Mucosa normal. No drainage or sinus tenderness. Throat: Lips, mucosa, and tongue normal. Teeth and gums normal.   Neck: Supple, symmetrical, trachea midline, no adenopathy, thyroid: no enlargement/tenderness/nodules, no carotid bruit and no JVD. Back:   Symmetric, no curvature. ROM normal. No CVA tenderness. Lungs:   Clear to auscultation bilaterally. Chest wall:  No tenderness or deformity.    Heart: Regular rate and rhythm, S1, S2 normal, no murmur, click, rub or gallop. Breast Exam:  No tenderness, masses, or nipple abnormality. Abdomen:   Soft, non-tender. Bowel sounds normal. No masses,  No organomegaly. Genitalia:  Normal female without lesion, discharge or tenderness. Rectal:  Normal tone,  no masses or tenderness  Guaiac negative stool. Extremities: Extremities normal, atraumatic, no cyanosis or edema. Pulses: 2+ and symmetric all extremities. Skin: Skin color, texture, turgor normal. No rashes or lesions. Lymph nodes: Cervical, supraclavicular, and axillary nodes normal.   Neurologic: CNII-XII intact. Normal strength, sensation and reflexes throughout. Patient Care Team:  Sheryl Bullard MD as PCP - General (Family Practice)  Brook Fortune MD as Physician (Hematology)  Jacqueline Burr MD as Physician (Breast Surgery)  Chaya Alonso MD (Plastic Surgery)  Hussain Benz MD (Plastic Surgery)    Advice/Referrals/Counseling   Education and counseling provided:  Are appropriate based on today's review and evaluation  Pneumococcal Vaccine  Screening Mammography  Colorectal cancer screening tests  Bone mass measurement (DEXA)      Assessment/Plan   HTN- Well controlled  Depression-stable  H/o breast CA and double mastectomy  GERD    ICD-10-CM ICD-9-CM    1. Essential hypertension, benign I10 401.1    2. Routine general medical examination at a health care facility Z00.00 V70.0    3. Screening for alcoholism Z13.89 V79.1    4. Screening for depression Z13.89 V79.0 DEPRESSION SCREEN ANNUAL   5. Screen for colon cancer Z12.11 V76.51 OCCULT BLOOD, IMMUNOASSAY (FIT)      REFERRAL TO GASTROENTEROLOGY   6. Need for hepatitis C screening test Z11.59 V73.89 HEPATITIS C AB   7. Encounter for screening mammogram for malignant neoplasm of breast Z12.31 V76.12 CANCELED: DENISE MAMMO BI SCREENING INCL CAD   8. Postmenopausal Z78.0 V49.81 DEXA BONE DENSITY STUDY AXIAL   9.  Encounter for immunization Z23 V03.89 ADMIN PNEUMOCOCCAL VACCINE      PNEUMOCOCCAL POLYSACCHARIDE VACCINE, 23-VALENT, ADULT OR IMMUNOSUPPRESSED PT DOSE,   10. GERD without esophagitis K21.9 530.81    11. Mild single current episode of major depressive disorder ([facility] F32.0 296.21    .

## 2017-04-05 NOTE — PATIENT INSTRUCTIONS

## 2017-04-05 NOTE — MR AVS SNAPSHOT
Visit Information Date & Time Provider Department Dept. Phone Encounter #  
 4/5/2017  9:30 AM Wilfred Murillo, [facility]  [id number] Follow-up Instructions Return in about 6 months (around 10/5/2017). Upcoming Health Maintenance Date Due DTaP/Tdap/Td series (1 - Tdap) 9/8/1971 BREAST CANCER SCRN MAMMOGRAM 10/22/2014 Pneumococcal 65+ High/Highest Risk (1 of 2 - PCV13) 9/8/2015 FOBT Q 1 YEAR AGE 50-75 9/24/2015 MEDICARE YEARLY EXAM 4/6/2018 GLAUCOMA SCREENING Q2Y 4/5/2019 Allergies as of 4/5/2017  Review Complete On: 4/5/2017 By: 200 Linda Murillo MD  
  
 Severity Noted Reaction Type Reactions Codeine  09/28/2012    Nausea Only Current Immunizations  Reviewed on 1/30/2013 Name Date Pneumococcal Polysaccharide (PPSV-23)  Incomplete Not reviewed this visit You Were Diagnosed With   
  
 Codes Comments Routine general medical examination at a health care facility     ICD-10-CM: Z00.00 ICD-9-CM: V70.0 Screening for alcoholism     ICD-10-CM: Z13.89 ICD-9-CM: V79.1 Screening for depression     ICD-10-CM: Z13.89 ICD-9-CM: V79.0 Screen for colon cancer     ICD-10-CM: Z12.11 ICD-9-CM: V76.51 Need for hepatitis C screening test     ICD-10-CM: Z11.59 
ICD-9-CM: V73.89 Encounter for screening mammogram for malignant neoplasm of breast     ICD-10-CM: Z12.31 
ICD-9-CM: V76.12 Postmenopausal     ICD-10-CM: Z78.0 ICD-9-CM: V49.81 Encounter for immunization     ICD-10-CM: Q26 ICD-9-CM: V03.89 Vitals BP Pulse Temp Resp Height(growth percentile) Weight(growth percentile) 139/73 (BP 1 Location: Left arm, BP Patient Position: Sitting) 66 97.8 °F (36.6 °C) (Oral) 20 5' 8\" (1.727 m) 145 lb (65.8 kg) SpO2 BMI OB Status Smoking Status 100% 22.05 kg/m2 Hysterectomy Never Smoker Vitals History BMI and BSA Data Body Mass Index Body Surface Area 22.05 kg/m 2 1.78 m 2 Preferred Pharmacy Pharmacy Name Phone [facility] [address], [address] [place] [address] Avenue [phone] Your Updated Medication List  
  
   
This list is accurate as of: 4/5/17 11:37 AM.  Always use your most recent med list.  
  
  
  
  
 hydroCHLOROthiazide 25 mg tablet Commonly known as:  HYDRODIURIL  
TAKE 1 TABLET BY MOUTH DAILY AFTER BREAKFAST  
  
 multivitamin tablet Commonly known as:  ONE A DAY Take 1 Tab by mouth daily (after breakfast). pantoprazole 40 mg tablet Commonly known as:  PROTONIX Take 1 Tab by mouth daily. Indications: GASTROESOPHAGEAL REFLUX, HEARTBURN We Performed the Following ADMIN PNEUMOCOCCAL VACCINE [ [facility]] [address] [[id number] HCPCS] HEPATITIS C AB [15894 CPT(R)] OCCULT BLOOD, IMMUNOASSAY (FIT) H329515 CPT(R)] PNEUMOCOCCAL POLYSACCHARIDE VACCINE, 23-VALENT, ADULT OR IMMUNOSUPPRESSED PT DOSE, [74936 CPT(R)] REFERRAL TO GASTROENTEROLOGY [OPK44 Custom] Follow-up Instructions Return in about 6 months (around 10/5/2017). To-Do List   
 04/08/2017 Imaging:  DEXA BONE DENSITY STUDY AXIAL Referral Information Referral ID Referred By Referred To  
  
 [id number] [address] Francesca Demarco MD   
   [address] [place] [address], [address] Phone: [phone] Fax: [phone] Visits Status Start Date End Date 1 New Request 4/5/17 4/5/18 If your referral has a status of pending review or denied, additional information will be sent to support the outcome of this decision. Patient Instructions Medicare Wellness Visit, Female The best way to live healthy is to have a healthy lifestyle by eating a well-balanced diet, exercising regularly, limiting alcohol and stopping smoking.  
 
Regular physical exams and screening tests are another way to keep healthy. Preventive exams provided by your health care provider can find health problems before they become diseases or illnesses. Preventive services including immunizations, screening tests, monitoring and exams can help you take care of your own health. All people over age 72 should have a pneumovax  and and a prevnar shot to prevent pneumonia. These are once in a lifetime unless you and your provider decide differently. All people over 65 should have a yearly flu shot and a tetanus vaccine every 10 years. A bone mass density to screen for osteoporosis or thinning of the bones should be done every 2 years after 65. Screening for diabetes mellitus with a blood sugar test should be done every year. Glaucoma is a disease of the eye due to increased ocular pressure that can lead to blindness and it should be done every year by an eye professional. 
 
Cardiovascular screening tests that check for elevated lipids (fatty part of blood) which can lead to heart disease and strokes should be done every 5 years. Colorectal screening that evaluates for blood or polyps in your colon should be done yearly as a stool test or every five years as a flexible sigmoidoscope or every 10 years as a colonoscopy up to age 76. Breast cancer screening with a mammogram is recommended biennially  for women age 54-69. Screening for cervical cancer with a pap smear and pelvic exam is recommended for women after age 72 years every 2 years up to age 79 or when the provider and patient decide to stop. If there is a history of cervical abnormalities or other increased risk for cancer then the test is recommended yearly. Hepatitis C screening is also recommended for anyone born between 80 through [address]. A shingles vaccine is also recommended once in a lifetime after age 61. Your Medicare Wellness Exam is recommended annually. Here is a list of your current Health Maintenance items with a due date: Health Maintenance Due Topic Date Due  
 DTaP/Tdap/Td  (1 - Tdap) 09/08/1971  Breast Cancer Screening  10/22/2014  Pneumococcal Vaccine (1 of 2 - PCV13) 09/08/2015  Stool testing for trace blood  09/24/2015 [facility] SERVICES! Dear Aydee Campbell: Thank you for requesting a Shapeways account. Our records indicate that you already have an active Shapeways account. You can access your account anytime at [email or web address] Did you know that you can access your hospital and ER discharge instructions at any time in Shapeways? You can also review all of your test results from your hospital stay or ER visit. Additional Information If you have questions, please visit the Frequently Asked Questions section of the Shapeways website at [email or web address] Remember, Shapeways is NOT to be used for urgent needs. For medical emergencies, dial 911. Now available from your iPhone and Android! Please provide this summary of care documentation to your next provider. Your primary care clinician is listed as Rosalind Parks. If you have any questions after today's visit, please call [phone].

## 2017-04-05 NOTE — PROGRESS NOTES
1. Have you been to the ER, urgent care clinic since your last visit? Hospitalized since your last visit? Yes When: 3/3/17 UC dizziness. 2. Have you seen or consulted any other health care providers outside of the [address] [place] [address] since your last visit? Include any pap smears or colon screening.  No.

## 2017-04-21 NOTE — TELEPHONE ENCOUNTER
Patient called because she needed another GI doctor. She was given Dr. Tanika Temple. He does not do colonoscopy. Per Dr. Ridge Freeman I gave patient the phone number to Dr. Gloria Rondon.

## 2017-05-10 NOTE — IP AVS SNAPSHOT
[address] 
[phone] Patient: Margareth Blanco MRN: [id number] XHK:[id number][birth year] You are allergic to the following Allergen Reactions Codeine Nausea Only Recent Documentation Height Weight Breastfeeding? BMI OB Status Smoking Status 1.715 m 66.2 kg No 22.53 kg/m2 Hysterectomy Never Smoker Emergency Contacts Name Discharge Info Relation Home Work Mobile [address] CAREGIVER [3] Sister [23] [phone] About your hospitalization You were admitted on:  May 10, 2017 You last received care in the:  [facility] ENDOSCOPY You were discharged on:  May 10, 2017 Unit phone number:  [phone] Why you were hospitalized Your primary diagnosis was:  Not on File Providers Seen During Your Hospitalizations Provider Role Specialty Primary office phone David Marx MD Attending Provider Gastroenterology [phone] Your Primary Care Physician (PCP) Primary Care Physician Office Phone Office Fax Sofía Moore [phone] Follow-up Information None Current Discharge Medication List  
  
CONTINUE these medications which have NOT CHANGED Dose & Instructions Dispensing Information Comments Morning Noon Evening Bedtime ASPIRIN PO Your last dose was: Your next dose is:    
   
   
 Dose:  81 mg Take 81 mg by mouth daily. Takes 81 mg po occasionally. Refills:  0  
     
   
   
   
  
 hydroCHLOROthiazide 25 mg tablet Commonly known as:  HYDRODIURIL Your last dose was: Your next dose is: TAKE 1 TABLET BY MOUTH DAILY AFTER BREAKFAST Quantity:  30 Tab Refills:  0  
     
   
   
   
  
 multivitamin tablet Commonly known as:  ONE A DAY Your last dose was: Your next dose is:    
   
   
 Dose:  1 Tab Take 1 Tab by mouth daily (after breakfast). Takes when remembered. Refills:  0 Discharge Instructions [address] 
[address] [facility], [address] [facility] Trinity Lemus 
[id number] 
1950 COLON DISCHARGE INSTRUCTIONS DISCOMFORT: 
Redness at IV site- apply warm compress to area; if redness or soreness persist- contact your physician There may be a slight amount of blood passed from the rectum Gaseous discomfort- walking, belching will help relieve any discomfort You may not operate a vehicle for 12 hours You may not engage in an occupation involving machinery or appliances for rest of today You may not drink alcoholic beverages for at least 12 hours Avoid making any critical decisions for at least 24 hour DIET: 
 Regular diet.  however -  remember your colon is empty and a heavy meal will produce gas. Avoid these foods:  vegetables, fried / greasy foods, carbonated drinks for today ACTIVITY: 
You may resume your normal daily activities it is recommended that you spend the remainder of the day resting -  avoid any strenuous activity. CALL M.D. ANY SIGN OF: Increasing pain, nausea, vomiting Abdominal distension (swelling) New increased bleeding (oral or rectal) Fever (chills) Pain in chest area Bloody discharge from nose or mouth Shortness of breath Follow-up Instructions: 
 Call Dr. Josefa Mckinney for any questions or problems. Telephone # [phone] Should have a repeat colonoscopy in 5 years Impression:  diverticulosis Diverticulosis: Care Instructions Your Care Instructions In diverticulosis, pouches called diverticula form in the wall of the large intestine (colon). The pouches do not cause any pain or other symptoms. Most people who have diverticulosis do not know they have it.  But the pouches sometimes bleed, and if they become infected, they can cause pain and other symptoms. When this happens, it is called diverticulitis. Diverticula form when pressure pushes the wall of the colon outward at certain weak points. A diet that is too low in fiber can cause diverticula. Follow-up care is a key part of your treatment and safety. Be sure to make and go to all appointments, and call your doctor if you are having problems. It's also a good idea to know your test results and keep a list of the medicines you take. How can you care for yourself at home? · Include fruits, leafy green vegetables, beans, and whole grains in your diet each day. These foods are high in fiber. · Take a fiber supplement, such as Citrucel or Metamucil, every day if needed. Read and follow all instructions on the label. · Drink plenty of fluids, enough so that your urine is light yellow or clear like water. If you have kidney, heart, or liver disease and have to limit fluids, talk with your doctor before you increase the amount of fluids you drink. · Get at least 30 minutes of exercise on most days of the week. Walking is a good choice. You also may want to do other activities, such as running, swimming, cycling, or playing tennis or team sports. · Cut out foods that cause gas, pain, or other symptoms. When should you call for help? Call your doctor now or seek immediate medical care if: 
· You have belly pain. · You pass maroon or very bloody stools. · You have a fever. · You have nausea and vomiting. · You have unusual changes in your bowel movements or abdominal swelling. · You have burning pain when you urinate. · You have abnormal vaginal discharge. · You have shoulder pain. · You have cramping pain that does not get better when you have a bowel movement or pass gas. · You pass gas or stool from your urethra while urinating. Watch closely for changes in your health, and be sure to contact your doctor if you have any problems. Where can you learn more? Go to http://anne-chris.info/. Enter Z629 in the search box to learn more about \"Diverticulosis: Care Instructions. \" Current as of: August 9, 2016 Content Version: 11.2 © 4852-7063 Mettl, iSoccer. Care instructions adapted under license by eFuelDepot (which disclaims liability or warranty for this information). If you have questions about a medical condition or this instruction, always ask your healthcare professional. [address] any warranty or liability for your use of this information. Discharge Orders None ACO Transitions of Care Introducing [employer] [address] Estrella Arias offers a voluntary care coordination program to provide high quality service and care to [facility] fee-for-service beneficiaries. Jerson Gabriel was designed to help you enhance your health and well-being through the following services: ? Transitions of Care  support for individuals who are transitioning from one care setting to another (example: Hospital to home). ? Chronic and Complex Care Coordination  support for individuals and caregivers of those with serious or chronic illnesses or with more than one chronic (ongoing) condition and those who take a number of different medications. If you meet specific medical criteria, a [address] [place] [address] Rd may call you directly to coordinate your care with your primary care physician and your other care providers. For questions about the [facility] programs, please, contact your physicians office. For general questions or additional information about Accountable Care Organizations: 
Please visit www.medicare.gov/acos. html or call 1-800-MEDICARE (4-257.795.1737) TTY users should call 5-461.680.4722. Introducing [facility] SERVICES! Dear Jose Kinsey: Thank you for requesting a flikdatehart account.   Our records indicate that you already have an active Senstore account. You can access your account anytime at [email or web address] Did you know that you can access your hospital and ER discharge instructions at any time in Senstore? You can also review all of your test results from your hospital stay or ER visit. Additional Information If you have questions, please visit the Frequently Asked Questions section of the Senstore website at [email or web address] Remember, Senstore is NOT to be used for urgent needs. For medical emergencies, dial 911. Now available from your iPhone and Android! General Information Please provide this summary of care documentation to your next provider. Patient Signature:  ____________________________________________________________ Date:  ____________________________________________________________  
  
[place] Star Provider Signature:  ____________________________________________________________ Date:  ____________________________________________________________

## 2017-05-10 NOTE — DISCHARGE INSTRUCTIONS
[address]  [address] [facility]  [id number]  1950    COLON DISCHARGE INSTRUCTIONS    DISCOMFORT:  Redness at IV site- apply warm compress to area; if redness or soreness persist- contact your physician  There may be a slight amount of blood passed from the rectum  Gaseous discomfort- walking, belching will help relieve any discomfort  You may not operate a vehicle for 12 hours  You may not engage in an occupation involving machinery or appliances for rest of today  You may not drink alcoholic beverages for at least 12 hours  Avoid making any critical decisions for at least 24 hour  DIET:   Regular diet. - however -  remember your colon is empty and a heavy meal will produce gas. Avoid these foods:  vegetables, fried / greasy foods, carbonated drinks for today         ACTIVITY:  You may resume your normal daily activities it is recommended that you spend the remainder of the day resting -  avoid any strenuous activity. CALL M.D. ANY SIGN OF:   Increasing pain, nausea, vomiting  Abdominal distension (swelling)  New increased bleeding (oral or rectal)  Fever (chills)  Pain in chest area  Bloody discharge from nose or mouth  Shortness of breath     Follow-up Instructions:   Call Dr. Melissa Bruno for any questions or problems. Telephone # [phone]  Should have a repeat colonoscopy in 5 years    Impression:  diverticulosis           Diverticulosis: Care Instructions  Your Care Instructions  In diverticulosis, pouches called diverticula form in the wall of the large intestine (colon). The pouches do not cause any pain or other symptoms. Most people who have diverticulosis do not know they have it. But the pouches sometimes bleed, and if they become infected, they can cause pain and other symptoms. When this happens, it is called diverticulitis. Diverticula form when pressure pushes the wall of the colon outward at certain weak points.  A diet that is too low in fiber can cause diverticula. Follow-up care is a key part of your treatment and safety. Be sure to make and go to all appointments, and call your doctor if you are having problems. It's also a good idea to know your test results and keep a list of the medicines you take. How can you care for yourself at home? · Include fruits, leafy green vegetables, beans, and whole grains in your diet each day. These foods are high in fiber. · Take a fiber supplement, such as Citrucel or Metamucil, every day if needed. Read and follow all instructions on the label. · Drink plenty of fluids, enough so that your urine is light yellow or clear like water. If you have kidney, heart, or liver disease and have to limit fluids, talk with your doctor before you increase the amount of fluids you drink. · Get at least 30 minutes of exercise on most days of the week. Walking is a good choice. You also may want to do other activities, such as running, swimming, cycling, or playing tennis or team sports. · Cut out foods that cause gas, pain, or other symptoms. When should you call for help? Call your doctor now or seek immediate medical care if:  · You have belly pain. · You pass maroon or very bloody stools. · You have a fever. · You have nausea and vomiting. · You have unusual changes in your bowel movements or abdominal swelling. · You have burning pain when you urinate. · You have abnormal vaginal discharge. · You have shoulder pain. · You have cramping pain that does not get better when you have a bowel movement or pass gas. · You pass gas or stool from your urethra while urinating. Watch closely for changes in your health, and be sure to contact your doctor if you have any problems. Where can you learn more? Go to http://anne-chris.info/. Enter E571 in the search box to learn more about \"Diverticulosis: Care Instructions. \"  Current as of: August 9, 2016  Content Version: 11.2  © 7264-8059 Healthwise, Incorporated. Care instructions adapted under license by Picwing (which disclaims liability or warranty for this information). If you have questions about a medical condition or this instruction, always ask your healthcare professional. [address] any warranty or liability for your use of this information.

## 2017-05-10 NOTE — PROCEDURES
[address]  [facility], [address]         Procedure:  Colonoscopy    :  Neelam Bales MD    Referring Provider: Gerald Strickland MD    Sedation:  MAC anesthesia Propofol      Prior to the procedure its objectives, risks, consequences and alternatives were discussed with the patient who then elected to proceed. The patient had the opportunity to ask questions and those questions were answered. A physical exam was performed. The heart, lungs, and mental status were examined prior to the procedure and found to be satisfactory for conscious sedation and for the procedure. Conscious sedation was initiated by the physician. Continuous pulse oximetry and blood pressure monitoring were used throughout the procedure. After appropriate analgesia and rectal exam, the colonoscope was passed into the anus and passed to the cecum without difficulty. The prep was good. On slow withdrawal of the scope, the cecum, ascending, colon, hepatic flexure, transverse colon, splenic flexure and descending colon were normal. In the sigmoid there were moderate diverticulosis. The rectum was normal. Retroflexion exam of the rectum was normal She tolerated the procedure without complication and I recommend a repeat colonoscopy in 5 years. Specimen Removed:  None    Complications: None. EBL:  None.     Neelam Bales MD  5/10/2017  9:21 AM

## 2017-05-10 NOTE — H&P
[address] [facility]  [facility], [facility] [address] Garrett Clark is a  77 y.o.  female who presents with a family history of colon cancer for screening. Last colonoscopy greater than 10 years ago.  .        Past Medical History:   Diagnosis Date    Anxiety and depression     [facility]    Left only but had a bilateral mastectomy/chemo    GERD (gastroesophageal reflux disease)     not an issure, no medications    Hypertension     Ill-defined condition     pinched nerve in back    Other ill-defined conditions     last chemo treatment 1/30/13    Sickle cell trait ([facility]      Past Surgical History:   Procedure Laterality Date    ABDOMEN SURGERY PROC UNLISTED      hernia as child    BREAST SURGERY PROCEDURE UNLISTED  10/11/12    LEFT SLNB and port-a-cath insertion    BREAST SURGERY PROCEDURE UNLISTED      bilateral mastectomy with reconstruction- states not arm restricted    HX BREAST RECONSTRUCTION  7/1/2013    BREAST TISSUE EXPANDER REMOVAL performed by Jennifer Flwoers MD at [facility] [address] HX LYMPHADENECTOMY  October 2012    sentinel node biopsy, negative    HX ORTHOPAEDIC Bilateral     ground down the bone d/t pressure on toes from shoes    HX VASCULAR ACCESS      insertion and removal    HX WISDOM TEETH EXTRACTION       Allergies   Allergen Reactions    Codeine Nausea Only     Current Facility-Administered Medications   Medication Dose Route Frequency Provider Last Rate Last Dose    0.9% sodium chloride infusion  50 mL/hr IntraVENous CONTINUOUS Lennox Snowball, MD 50 mL/hr at 05/10/17 0852 50 mL/hr at 05/10/17 0852    sodium chloride (NS) flush 5-10 mL  5-10 mL IntraVENous Q8H Lennox Snowball, MD        sodium chloride (NS) flush 5-10 mL  5-10 mL IntraVENous PRN Lennox Snowball, MD        midazolam (VERSED) injection 0.25-5 mg  0.25-5 mg IntraVENous Multiple Lennox Snowball, MD        fentaNYL citrate (PF) injection 100 mcg  100 mcg IntraVENous Multiple Kate Robert MD        naloxone [facility]) injection 0.4 mg  0.4 mg IntraVENous Multiple Kate Robert MD        flumazenil (ROMAZICON) 0.1 mg/mL injection 0.2 mg  0.2 mg IntraVENous Alexsandra Robert MD        [facility]) 49OP/9.3SZ oral drops 80 mg  1.2 mL Oral Multiple Kate Robert MD        atropine injection 0.5 mg  0.5 mg IntraVENous ONCE PRN Kate Robert MD        EPINEPHrine (ADRENALIN) 0.1 mg/mL syringe 1 mg  1 mg Endoscopically ONCE PRN Kate Robert MD           Visit Vitals    BP (!) 207/76    Pulse 70    Temp 98.2 °F (36.8 °C)    Resp 12    Ht 5' 7.5\" (1.715 m)    Wt 66.2 kg (146 lb)    SpO2 99%    Breastfeeding No    BMI 22.53 kg/m2           PHYSICAL EXAM:  General: WD, WN. Alert, cooperative, no acute distress    HEENT: NC, Atraumatic. PERRLA, EOMI. Anicteric sclerae. Mallampati score 2  Lungs:  CTA Bilaterally. No Wheezing/Rhonchi/Rales. Heart:  Regular  rhythm,  No murmur (), No Rubs, No Gallops  Abdomen: Soft, Non distended, Non tender.  +Bowel sounds, no HSM  Extremities: No c/c/e  Neurologic:  CN 2-12 gi, Alert and oriented X 3. No acute neurological distress   Psych:   Good insight. Not anxious nor agitated. Plan:   Endoscopic procedure with MAC.     Kate Robert MD

## 2017-05-10 NOTE — ANESTHESIA POSTPROCEDURE EVALUATION
Post-Anesthesia Evaluation and Assessment    Patient: Rosemary Medina MRN: [id number]  SSN: [id number]    YOB: 1950  Age: 77 y.o. Sex: female       Cardiovascular Function/Vital Signs  Visit Vitals    /59    Pulse 69    Temp 36.1 °C (97 °F)    Resp 19    Ht 5' 7.5\" (1.715 m)    Wt 66.2 kg (146 lb)    SpO2 100%    Breastfeeding No    BMI 22.53 kg/m2       Patient is status post MAC anesthesia for Procedure(s):  COLONOSCOPY. Nausea/Vomiting: None    Postoperative hydration reviewed and adequate. Pain:  Pain Scale 1: Numeric (0 - 10) (05/10/17 0945)  Pain Intensity 1: 0 (05/10/17 0945)   Managed    Neurological Status: At baseline    Mental Status and Level of Consciousness: Arousable    Pulmonary Status:   O2 Device: Room air (05/10/17 0945)   Adequate oxygenation and airway patent    Complications related to anesthesia: None    Post-anesthesia assessment completed.  No concerns    Signed By: Guille Whipple MD     May 10, 2017

## 2017-05-10 NOTE — ANESTHESIA PREPROCEDURE EVALUATION
Anesthetic History   No history of anesthetic complications            Review of Systems / Medical History  Patient summary reviewed, nursing notes reviewed and pertinent labs reviewed    Pulmonary  Within defined limits                 Neuro/Psych   Within defined limits           Cardiovascular    Hypertension                   GI/Hepatic/Renal     GERD          Comments: screening Endo/Other            Comments: Hx. Breast CA  Remote hx chemo Other Findings   Comments: Double breast masectomy         Physical Exam    Airway  Mallampati: I  TM Distance: > 6 cm  Neck ROM: normal range of motion   Mouth opening: Normal     Cardiovascular    Rhythm: regular  Rate: normal         Dental         Pulmonary  Breath sounds clear to auscultation               Abdominal  Abdominal exam normal       Other Findings            Anesthetic Plan    ASA: 2  Anesthesia type: MAC          Induction: Intravenous  Anesthetic plan and risks discussed with: Patient

## 2017-05-19 NOTE — DISCHARGE INSTRUCTIONS
Back Pain: Care Instructions  Your Care Instructions    Back pain has many possible causes. It is often related to problems with muscles and ligaments of the back. It may also be related to problems with the nerves, discs, or bones of the back. Moving, lifting, standing, sitting, or sleeping in an awkward way can strain the back. Sometimes you don't notice the injury until later. Arthritis is another common cause of back pain. Although it may hurt a lot, back pain usually improves on its own within several weeks. Most people recover in 12 weeks or less. Using good home treatment and being careful not to stress your back can help you feel better sooner. Follow-up care is a key part of your treatment and safety. Be sure to make and go to all appointments, and call your doctor if you are having problems. Its also a good idea to know your test results and keep a list of the medicines you take. How can you care for yourself at home? · Sit or lie in positions that are most comfortable and reduce your pain. Try one of these positions when you lie down:  ¨ Lie on your back with your knees bent and supported by large pillows. ¨ Lie on the floor with your legs on the seat of a sofa or chair. Durene Ligas on your side with your knees and hips bent and a pillow between your legs. ¨ Lie on your stomach if it does not make pain worse. · Do not sit up in bed, and avoid soft couches and twisted positions. Bed rest can help relieve pain at first, but it delays healing. Avoid bed rest after the first day of back pain. · Change positions every 30 minutes. If you must sit for long periods of time, take breaks from sitting. Get up and walk around, or lie in a comfortable position. · Try using a heating pad on a low or medium setting for 15 to 20 minutes every 2 or 3 hours. Try a warm shower in place of one session with the heating pad. · You can also try an ice pack for 10 to 15 minutes every 2 to 3 hours.  Put a thin cloth between the ice pack and your skin. · Take pain medicines exactly as directed. ¨ If the doctor gave you a prescription medicine for pain, take it as prescribed. ¨ If you are not taking a prescription pain medicine, ask your doctor if you can take an over-the-counter medicine. · Take short walks several times a day. You can start with 5 to 10 minutes, 3 or 4 times a day, and work up to longer walks. Walk on level surfaces and avoid hills and stairs until your back is better. · Return to work and other activities as soon as you can. Continued rest without activity is usually not good for your back. · To prevent future back pain, do exercises to stretch and strengthen your back and stomach. Learn how to use good posture, safe lifting techniques, and proper body mechanics. When should you call for help? Call your doctor now or seek immediate medical care if:  · You have new or worsening numbness in your legs. · You have new or worsening weakness in your legs. (This could make it hard to stand up.)  · You lose control of your bladder or bowels. Watch closely for changes in your health, and be sure to contact your doctor if:  · Your pain gets worse. · You are not getting better after 2 weeks. Where can you learn more? Go to http://anne-chris.info/. Enter T608 in the search box to learn more about \"Back Pain: Care Instructions. \"  Current as of: May 23, 2016  Content Version: 11.2  © 5183-2981 [employer]wise, [employer]. Care instructions adapted under license by [employer] (which disclaims liability or warranty for this information). If you have questions about a medical condition or this instruction, always ask your healthcare professional. [address] any warranty or liability for your use of this information.

## 2017-05-19 NOTE — UC PROVIDER NOTE
Patient is a 77 y.o. female presenting with back pain. The history is provided by the patient. Back Pain    This is a new problem. The current episode started more than 1 week ago (over a month ago). The problem has been gradually worsening. The problem occurs daily. Patient reports not work related injury. The pain is associated with no known injury. The pain is present in the lower back. The quality of the pain is described as shooting and sharp. The pain radiates to the right thigh and right knee. The pain is mild. The symptoms are aggravated by certain positions (coughing,). Pertinent negatives include no chest pain, no fever, no numbness, no weight loss, no headaches, no abdominal pain, no abdominal swelling, no bowel incontinence, no perianal numbness, no bladder incontinence, no dysuria, no pelvic pain, no leg pain, no paresthesias, no paresis, no tingling and no weakness. Treatments tried: acetaminophen. The treatment provided no relief. Risk factors include menopause.  The patient's surgical history non-contributory        Past Medical History:   Diagnosis Date    Anxiety and depression     Breast CA ([facility] 2012    Left only but had a bilateral mastectomy/chemo    GERD (gastroesophageal reflux disease)     not an issure, no medications    Hypertension     Ill-defined condition     pinched nerve in back    Other ill-defined conditions     last chemo treatment 1/30/13    Sickle cell [facility] ([facility]         Past Surgical History:   Procedure Laterality Date    ABDOMEN SURGERY PROC UNLISTED      hernia as child    BREAST SURGERY PROCEDURE UNLISTED  10/11/12    LEFT SLNB and port-a-cath insertion    BREAST SURGERY PROCEDURE UNLISTED      bilateral mastectomy with reconstruction- states not arm restricted    COLONOSCOPY N/A 5/10/2017    COLONOSCOPY performed by [facility]y Conley MD at [facility] ENDOSCOPY    HX BREAST RECONSTRUCTION  7/1/2013    BREAST TISSUE EXPANDER REMOVAL performed by Ruben Epstein MD at [facility] AMBULATORY OR    HX HYSTERECTOMY  1983    HX LYMPHADENECTOMY  October 2012    sentinel node biopsy, negative    HX ORTHOPAEDIC Bilateral     ground down the bone d/t pressure on toes from shoes    HX VASCULAR ACCESS      insertion and removal    HX WISDOM TEETH EXTRACTION           Family History   Problem Relation Age of Onset    Cancer Mother      colon cancer    Heart Disease Father     Diabetes Father     Cancer Sister 27     breast cancer    MS Sister     Cancer Other 36     breast cancer    Cancer Maternal Grandfather      MOUTH/THROAT    Cancer Paternal Grandmother      BRAIN    Sickle Cell Anemia Brother     Malignant Hyperthermia Neg Hx     Pseudocholinesterase Deficiency Neg Hx     Delayed Awakening Neg Hx     Post-op Nausea/Vomiting Neg Hx     Emergence Delirium Neg Hx     Post-op Cognitive Dysfunction Neg Hx     Other Neg Hx         Social History     Social History    Marital status:      Spouse name: N/A    Number of children: N/A    Years of education: N/A     Occupational History    Not on file. Social History Main Topics    Smoking status: Never Smoker    Smokeless tobacco: Never Used    Alcohol use No    Drug use: Yes     Special: Marijuana      Comment: 7 MONTHS AGO    Sexual activity: Not on file     Other Topics Concern    Not on file     Social History Narrative                ALLERGIES: Codeine    Review of Systems   Constitutional: Negative for fever and weight loss. HENT: Negative. Cardiovascular: Negative. Negative for chest pain. Gastrointestinal: Negative. Negative for abdominal pain and bowel incontinence. Genitourinary: Negative. Negative for bladder incontinence, dysuria and pelvic pain. Musculoskeletal: Positive for back pain. Neurological: Negative. Negative for tingling, weakness, numbness, headaches and paresthesias. All other systems reviewed and are negative.       Vitals:    05/19/17 1020   BP: 127/75   Pulse: 86 Resp: 16   Temp: 97.9 °F (36.6 °C)   SpO2: 95%   Weight: 64.9 kg (143 lb)   Height: 5' 8\" (1.727 m)       Physical Exam   Constitutional: She is oriented to person, place, and time. She appears well-developed and well-nourished. HENT:   Head: Normocephalic and atraumatic. Mouth/Throat: Oropharynx is clear and moist. No oropharyngeal exudate. Eyes: Conjunctivae and EOM are normal. Pupils are equal, round, and reactive to light. Right eye exhibits no discharge. Left eye exhibits no discharge. No scleral icterus. Neck: Normal range of motion. No tracheal deviation present. No thyromegaly present. No midline spinal tenderness   Cardiovascular: Normal rate, regular rhythm and normal heart sounds. No murmur heard. Pulmonary/Chest: Effort normal and breath sounds normal. No respiratory distress. She has no wheezes. She has no rales. She exhibits no tenderness. Abdominal: Soft. Bowel sounds are normal. She exhibits no distension. There is no tenderness. There is no rebound and no guarding. Musculoskeletal: Normal range of motion. She exhibits no edema or tenderness. Lymphadenopathy:     She has no cervical adenopathy. Neurological: She is alert and oriented to person, place, and time. No cranial nerve deficit. Coordination normal.   Normal sacral sensation, no saddle anesthesia   Skin: Skin is warm. No erythema. Psychiatric: She has a normal mood and affect. Her behavior is normal. Judgment and thought content normal.   Nursing note and vitals reviewed.       MDM     Differential Diagnosis; Clinical Impression; Plan:     Back pain, chronic  Possible sciatica, doubt muscular pain  xrays with curvature of thoracolumbar spine, no acute findings      Procedures

## 2017-06-21 NOTE — MR AVS SNAPSHOT
Visit Information Date & Time Provider Department Dept. Phone Encounter #  
 6/21/2017  2:15 PM Hoa Eastman MD [facility] [phone] [id number] Follow-up Instructions Return in about 4 weeks (around 7/19/2017), or if symptoms worsen or fail to improve. Your Appointments 10/5/2017  9:45 AM  
ROUTINE CARE with Marilynn Honeycutt, RAVINDRA  
[address]) Appt Note: 6 month follow up [place] [address] [address]  
[phone]  
  
   
 [place] [address] Upcoming Health Maintenance Date Due DTaP/Tdap/Td series (1 - Tdap) 4/19/2019* INFLUENZA AGE 9 TO ADULT 8/1/2017 Pneumococcal 65+ High/Highest Risk (2 of 2 - PCV13) 4/5/2018 MEDICARE YEARLY EXAM 4/6/2018 FOBT Q 1 YEAR AGE 50-75 4/20/2018 GLAUCOMA SCREENING Q2Y 4/5/2019 BREAST CANCER SCRN MAMMOGRAM 4/5/2019 *Topic was postponed. The date shown is not the original due date. Allergies as of 6/21/2017  Review Complete On: 6/21/2017 By: Hoa Eastman MD  
  
 Severity Noted Reaction Type Reactions Codeine  09/28/2012    Nausea Only Current Immunizations  Reviewed on 1/30/2013 Name Date Pneumococcal Polysaccharide (PPSV-23) 4/5/2017 Not reviewed this visit You Were Diagnosed With   
  
 Codes Comments Lumbar radiculopathy, acute    -  Primary ICD-10-CM: M54.16 
ICD-9-CM: 724.4 Establishing care with new doctor, encounter for     ICD-10-CM: Z71.89 ICD-9-CM: V65.8 Vitals BP Pulse Temp Resp Height(growth percentile) Weight(growth percentile) 110/70 81 98.1 °F (36.7 °C) (Oral) 14 5' 8\" (1.727 m) 144 lb 6.4 oz (65.5 kg) SpO2 BMI OB Status Smoking Status 98% 21.96 kg/m2 Hysterectomy Never Smoker BMI and BSA Data Body Mass Index Body Surface Area  
 21.96 kg/m 2 1.77 m 2 Preferred Pharmacy Pharmacy Name Phone [facility] [address] An[address] Perez  [place] [phone] Your Updated Medication List  
  
   
This list is accurate as of: 6/21/17  3:28 PM.  Always use your most recent med list.  
  
  
  
  
 ASPIRIN PO Take 81 mg by mouth daily. Takes 81 mg po occasionally. diclofenac potassium 50 mg tablet Commonly known as:  CATAFLAM  
Take 1 Tab by mouth three (3) times daily. hydroCHLOROthiazide 25 mg tablet Commonly known as:  HYDRODIURIL  
TAKE 1 TABLET BY MOUTH DAILY AFTER BREAKFAST  
  
 ibuprofen 800 mg tablet Commonly known as:  MOTRIN Take 1 Tab by mouth two (2) times daily (after meals). multivitamin tablet Commonly known as:  ONE A DAY Take 1 Tab by mouth daily (after breakfast). Takes when remembered. predniSONE 10 mg dose pack Commonly known as:  STERAPRED DS As directed Prescriptions Sent to Pharmacy Refills  
 ibuprofen (MOTRIN) 800 mg tablet 12 Sig: Take 1 Tab by mouth two (2) times daily (after meals). Class: Normal  
 Pharmacy: [facility] [address] Ph #: [phone] Route: Oral  
  
We Performed the Following AMB POC GLUCOSE BLOOD, BY GLUCOSE MONITORING DEVICE [63277 CPT(R)] AMB POC LIPID PROFILE [72881 CPT(R)] CBC W/O DIFF [60877 CPT(R)] METABOLIC PANEL, COMPREHENSIVE [38343 CPT(R)] REFERRAL TO NEUROSURGERY [TMO97 Custom] REFERRAL TO PHYSICAL THERAPY [KGK81 Custom] Follow-up Instructions Return in about 4 weeks (around 7/19/2017), or if symptoms worsen or fail to improve. Referral Information Referral ID Referred By Referred To  
  
 [id number] Ann Gillis, [address], PT, DPT   
   [address] [facility], [facility] [address] Phone: [phone] Fax: [phone] Visits Status Start Date End Date 1 New Request 6/21/17 6/21/18 If your referral has a status of pending review or denied, additional information will be sent to support the outcome of this decision. Referral ID Referred By Referred To  
 [id number] Leobardo Jaramillo MD  
   [address]donado, [address] Phone: [phone] Fax: [phone] Visits Status Start Date End Date 1 New Request 6/21/17 6/21/18 If your referral has a status of pending review or denied, additional information will be sent to support the outcome of this decision. Introducing [facility]! Dear Cora Bryant: Thank you for requesting a [employer] account. Our records indicate that you already have an active [employer] account. You can access your account anytime at [email or web address] Did you know that you can access your hospital and ER discharge instructions at any time in [employer]? You can also review all of your test results from your hospital stay or ER visit. Additional Information If you have questions, please visit the Frequently Asked Questions section of the [employer] website at [email or web address] Remember, [employer] is NOT to be used for urgent needs. For medical emergencies, dial 911. Now available from your iPhone and Android! Please provide this summary of care documentation to your next provider. Your primary care clinician is listed as Danielle Hurt. If you have any questions after today's visit, please call [phone].

## 2017-06-21 NOTE — PROGRESS NOTES
Lani Solano is a 77 y.o. female and presents with Establish Care and Back Pain (sciatic/RIGHT)  . Subjective:    Back Pain Review:  Patient presents for evaluation of low back problems. Symptoms have been present for several weeks and include pain in lower back (dull, mild in character; 3-4/10 in severity). Initial inciting event: unknown. Symptoms are worst: at times. Alleviating factors identifiable by patient are lying flat, medication . Exacerbating factors identifiable by patient are bending forwards, bending backwards. Treatments so far initiated by patient: medication Previous lower back problems: reported. Previous workup: x-rays. she has pains radiating down the rt leg with a numbness in the thigh reported       Review of Systems  Constitutional: negative for fevers, chills, anorexia and weight loss  Eyes:   negative for visual disturbance and irritation  ENT:   negative for tinnitus,sore throat,nasal congestion,ear pains. hoarseness  Respiratory:  negative for cough, hemoptysis, dyspnea,wheezing  CV:   negative for chest pain, palpitations, lower extremity edema  GI:   negative for nausea, vomiting, diarrhea, abdominal pain,melena  Endo:               negative for polyuria,polydipsia,polyphagia,heat intolerance  Genitourinary: negative for frequency, dysuria and hematuria  Integument:  negative for rash and pruritus  Hematologic:  negative for easy bruising and gum/nose bleeding  Musculoskel: myalgias, arthralgias,  joint pain  Neurological:  negative for headaches, dizziness, vertigo, memory problems and gait   Behavl/Psych: negative for feelings of anxiety, depression, mood changes    Past Medical History:   Diagnosis Date    Anxiety and depression     Breast CA ([facility] [address] 2012    Left only but had a bilateral mastectomy/chemo    GERD (gastroesophageal reflux disease)     not an issure, no medications    Hypertension     Ill-defined condition     pinched nerve in back    Other ill-defined conditions     last chemo treatment 1/30/13    Sickle cell trait [facility])      Past Surgical History:   Procedure Laterality Date    ABDOMEN SURGERY PROC UNLISTED      hernia as child    BREAST SURGERY PROCEDURE UNLISTED  10/11/12    LEFT SLNB and port-a-cath insertion    BREAST SURGERY PROCEDURE UNLISTED      bilateral mastectomy with reconstruction- states not arm restricted    COLONOSCOPY N/A 5/10/2017    COLONOSCOPY performed by Kathleen Burrell MD at [address] HX BREAST RECONSTRUCTION  7/1/2013    BREAST TISSUE EXPANDER REMOVAL performed by Alexx Villarreal MD at [address] [place] [address] [address] HX LYMPHADENECTOMY  October 2012    sentinel node biopsy, negative    HX ORTHOPAEDIC Bilateral     ground down the bone d/t pressure on toes from shoes    HX VASCULAR ACCESS      insertion and removal    HX WISDOM TEETH EXTRACTION       Social History     Social History    Marital status:      Spouse name: N/A    Number of children: N/A    Years of education: N/A     Social History Main Topics    Smoking status: Never Smoker    Smokeless tobacco: Never Used    Alcohol use No    Drug use: Yes     Special: Marijuana      Comment: 7 MONTHS AGO    Sexual activity: Not Asked     Other Topics Concern    None     Social History Narrative     Family History   Problem Relation Age of Onset    Cancer Mother      colon cancer    Heart Disease Father     Diabetes Father     Cancer Sister 27     breast cancer    MS Sister     Cancer Other 36     breast cancer    Cancer Maternal Grandfather      MOUTH/THROAT    Cancer Paternal Grandmother      BRAIN    Sickle Cell Anemia Brother     Malignant Hyperthermia Neg Hx     Pseudocholinesterase Deficiency Neg Hx     Delayed Awakening Neg Hx     Post-op Nausea/Vomiting Neg Hx     Emergence Delirium Neg Hx     Post-op Cognitive Dysfunction Neg Hx     Other Neg Hx      Current Outpatient Prescriptions   Medication Sig Dispense Refill    ibuprofen (MOTRIN) 800 mg tablet Take 1 Tab by mouth two (2) times daily (after meals). 60 Tab 12    ASPIRIN PO Take 81 mg by mouth daily. Takes 81 mg po occasionally.  hydroCHLOROthiazide (HYDRODIURIL) 25 mg tablet TAKE 1 TABLET BY MOUTH DAILY AFTER BREAKFAST 30 Tab 0    multivitamin (ONE A DAY) tablet Take 1 Tab by mouth daily (after breakfast). Takes when remembered.  diclofenac potassium (CATAFLAM) 50 mg tablet Take 1 Tab by mouth three (3) times daily. 30 Tab 0    predniSONE (STERAPRED DS) 10 mg dose pack As directed 21 Tab 0     Allergies   Allergen Reactions    Codeine Nausea Only       Objective:  Visit Vitals    /70    Pulse 81    Temp 98.1 °F (36.7 °C) (Oral)    Resp 14    Ht 5' 8\" (1.727 m)    Wt 144 lb 6.4 oz (65.5 kg)    SpO2 98%    BMI 21.96 kg/m2     Physical Exam:   General appearance - alert, well appearing, and in no distress  Mental status - alert, oriented to person, place, and time  EYE-SERA, EOMI, corneas normal, no foreign bodies  ENT-ENT exam normal, no neck nodes or sinus tenderness  Nose - normal and patent, no erythema, discharge or polyps  Mouth - mucous membranes moist, pharynx normal without lesions  Neck - supple, no significant adenopathy   Chest - clear to auscultation, no wheezes, rales or rhonchi, symmetric air entry   Heart - normal rate, regular rhythm, normal S1, S2, no murmurs, rubs, clicks or gallops   Abdomen - soft, nontender, nondistended, no masses or organomegaly  Lymph- no adenopathy palpable  Ext-peripheral pulses normal, no pedal edema, no clubbing or cyanosis  Skin-Warm and dry.  no hyperpigmentation, vitiligo, or suspicious lesions  Neuro -alert, oriented, normal speech, no focal findings or movement disorder noted  Neck-normal C-spine, no tenderness, full ROM without pain  Feet-no nail deformities or callus formation with good pulses noted  Back-tenderness lower lumbar spine and sacral spine noted,forward flexion,hyperextension impaired,negative straight leg raise        Results for orders placed or performed in visit on 04/05/17   OCCULT BLOOD, IMMUNOASSAY (FIT)   Result Value Ref Range    Occult Blood fecal, by [place] Negative Negative   HEPATITIS C AB   Result Value Ref Range    Hep C Virus Ab <0.1 0.0 - 0.9 s/co ratio       Assessment/Plan:    ICD-10-CM ICD-9-CM    1. Lumbar radiculopathy, acute M54.16 724.4 REFERRAL TO PHYSICAL THERAPY      REFERRAL TO NEUROSURGERY   2. Establishing care with new doctor, encounter for Z71.89 V65.8 AMB POC LIPID PROFILE      AMB POC GLUCOSE BLOOD, BY GLUCOSE MONITORING DEVICE      CBC W/O DIFF      METABOLIC PANEL, COMPREHENSIVE     Orders Placed This Encounter    CBC W/O DIFF    METABOLIC PANEL, COMPREHENSIVE    REFERRAL TO PHYSICAL THERAPY     Referral Priority:   Routine     Referral Type:   PT/OT/ST     Referral Reason:   Specialty Services Required     Referred to Provider:   Miriam Alonzo PT, DPT     Number of Visits Requested:   1    REFERRAL TO NEUROSURGERY     Referral Priority:   Routine     Referral Type:   Consultation     Referral Reason:   Specialty Services Required     Referred to Provider:   Saint Fent, MD     Number of Visits Requested:   1    AMB POC LIPID PROFILE    AMB POC GLUCOSE BLOOD, BY GLUCOSE MONITORING DEVICE    ibuprofen (MOTRIN) 800 mg tablet     Sig: Take 1 Tab by mouth two (2) times daily (after meals). Dispense:  60 Tab     Refill:  12     continue present plan,Take 81mg aspirin daily  There are no Patient Instructions on file for this visit. Follow-up Disposition:  Return in about 4 weeks (around 7/19/2017), or if symptoms worsen or fail to improve. I have reviewed with the patient details of the assessment and plan and all questions were answered. Relevent patient education was performed. The most recent lab findings were reviewed with the patient. An After Visit Summary was printed and given to the patient.

## 2017-06-27 NOTE — PROGRESS NOTES
[address] [id number] (MOB IV), [address] [facility] Jeny Kwok  Phone: [phone] Fax: [phone]     Plan of Care/Statement of Necessity for Physical Therapy Services  2-15    Patient name: Shawn Maldonado  : 1950  Provider#: [id number]  Referral source: Astrid Gibson MD      Medical/Treatment Diagnosis: Radiculopathy, lumbar region [M54.16]     Prior Hospitalization: see medical history     Comorbidities: Back pain, cancer, depression, gastrointestinal disease, HTN  Prior Level of Function: The patient completed 20 minutes of exercise seldom or never  Medications: Verified on Patient Summary List  Start of Care: 17      Onset Date: 5/3/17   The Plan of Care and following information is based on the information from the initial evaluation. Assessment/ key information: The Pt is a pleasant 77year old female who presents to therapy with acute right lower back pain with pain that radiates down the anterior right thigh and into the foot. The Pt currently presents with decreased hip abduction and extension strength and stability, decreased gluteal activation with dynamic activity, decreased core strength and stability, restrictions in her lower back and hip musculature flexibility, decreased balance and neuromuscular control, decreased thoracic and lumbar spinal mobility, and decreased activity tolerance and endurance. The patient would benefit from skilled physical therapy to help improve the above listed impairments to allow the patient to safely return to their prior level of function with less overall pain or risk of further injury. The patient has a fair prognosis with skilled physical therapy.       Evaluation Complexity History MEDIUM  Complexity : 1-2 comorbidities / personal factors will impact the outcome/ POC ; Examination HIGH Complexity : 4+ Standardized tests and measures addressing body structure, function, activity limitation and / or participation in recreation  ;Presentation MEDIUM Complexity : Evolving with changing characteristics  ; Clinical Decision Making MEDIUM Complexity : FOTO score of 26-74  Overall Complexity Rating: MEDIUM    Problem List: pain affecting function, decrease ROM, decrease strength, impaired gait/ balance, decrease ADL/ functional abilitiies, decrease activity tolerance, decrease flexibility/ joint mobility and decrease transfer abilities   Treatment Plan may include any combination of the following: Therapeutic exercise, Therapeutic activities, Neuromuscular re-education, Physical agent/modality, Gait/balance training, Manual therapy, Patient education, Self Care training, Functional mobility training, Home safety training and Stair training  Patient / Family readiness to learn indicated by: asking questions and interest  Persons(s) to be included in education: patient (P)  Barriers to Learning/Limitations: None  Patient Goal (s): reduce pain  Patient Self Reported Health Status: good  Rehabilitation Potential: fair    Short Term Goals: To be accomplished in 6 treatments:  1. The Pt will be independent and complaint with her HEP. Long Term Goals: To be accomplished in 12 treatments:  1. The Pt will score the MCII on her FOTO survey demonstrating improved overall function (57 to 60 points). 2. The Pt will be able to sit >/= 1 hr without increased lower back pain to allow the Pt to be able to drive with less pain or discomfort. 3. The Pt will be able to bend forward without increased lower back pain to allow the Pt to be able to don/doff shoes and  objects from the ground with less pain or discomfort. Frequency / Duration: Patient to be seen 2 times per week for 12 treatments.     Patient/ Caregiver education and instruction: self care, activity modification and exercises    []  Plan of care has been reviewed with ANNA    G-Herlinda (GP)  Position   Current  CK= 40-59%   Goal  CJ= 20-39%    The severity rating is based on clinical judgment and the FOTO Score score. Certification Period: 6/27/17-9/27/17    Eyad Pena, PT 6/27/2017 2:24 PM    ________________________________________________________________________    I certify that the above Therapy Services are being furnished while the patient is under my care. I agree with the treatment plan and certify that this therapy is necessary.     [de-identified] Signature:____________________  Date:____________Time: _________

## 2017-06-27 NOTE — PROGRESS NOTES
PT INITIAL EVALUATION NOTE - [facility] 2-15    Patient Name: Raudel Gan  Date:2017  : 1950  [x]  Patient  Verified  Payor: Jani Huffman / Plan: [place] & [place] / Product Type: Medicare /    In time: 9:35 AM  Out time: 10:41 AM  Total Treatment Time (min): 66 minutes  Total Timed Codes (min): 20 minutes  1:1 Treatment Time ( [address] only): 66 minutes   Visit #: 1     Treatment Area: Radiculopathy, lumbar region [M54.16]    SUBJECTIVE  Pain Level (0-10 scale): 3.5/10  Any medication changes, allergies to medications, adverse drug reactions, diagnosis change, or new procedure performed?: [] No    [x] Yes (see summary sheet for update)  Subjective: The Pt reports feeling of a pulled muscle on 5/3/17 and then two weeks later she bent down to pick something up and had an extremely sharp pain in her lower back that was an 8/10. She states that the pain progressed and she went to urgent care where she was given prednisone and diclofenac and it seemed to help reduce her pain. She states that a few days later she had to do an 8 hr drive and it flared up again. Currently she is experiencing right-sided lower back pain that radiates the anterior right thigh and down into her right foot. She states the worst pain is in the right groin region. Bending forward, sitting > 30 minutes, standing from a low chair, and twisting aggravate her pain. Walking, rest, and heat help to reduce her pain. She is sleeping well at night; she sleeps on her back with her legs elevated or on her side. She lives in a two-story home with the bedroom on the 2nd floor (10 stairs to get up into the bedroom) and 6 stairs to enter the home- her right groin is irritated when she has to step up through that leg. She is independent with her ADLs, but is having more pain and discomfort. She is a caregiver for her  .  PMH: HTN, Left breast cancer with a double mastectomy in , numbness in her feet bilaterally secondary to chemotherapy. PMH: ibuprofen 800mg PRN for pain.     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   Living Situation: Lives in 2 story home with  and sister; bedroom on 2nd floor  Pt Goals: \"reduce pain\"  Barriers: None  Motivation: WFL  Substance use: N/A  FABQ Score: 43/100  Cognition: A & O x 4        OBJECTIVE/EXAMINATION  Posture:  Unrermarkable  Other Observations:  Unremarkable  Gait and Functional Mobility:  Decreased right stance time at midstance; mild right Trendelenburg gait    Lumbar ROM:   Flexion: Mild   Extension: Mild   Side Bending: Right: WFL   Left: WFL   Rotation: Right: WFL    Left: Mild    Balance Assessment: Mild deficits in balance and neuromuscular control in single limb stance bilaterally    Squat Assessment:   Double Leg Deviate to the left, quadriceps dominant, forward trunk lean, genu valgus   Single Leg NT    Neurological: Sensations: Intact to light touch sensation L1-S1    Flexibility: Moderate restrictions in hamstrings, hip internal and external rotators, quadriceps, iliopsoas, and gastrocnemius/soleus complex bilaterally     Right Knee:  AROM WFL   Left  Knee:  AROM WFL     LOWER QUARTER   MUSCLE STRENGTH  KEY       R  L  0 - No Contraction  Hip flex  4  4+  1 - Trace          er    4-  4  2 - Poor          ir   4-  4  3 - Fair           abd  3+  4-  4 - Good          ext  3+  3+  5 - Normal   Knee flex  4  4+               Ext  4  4+      Ankle DF  4+  4+                PF  4+  4+       Gluteal activation: The Pt has improper gluteal activation with hip extension bilaterally     Joint Mobility Assessment: Decreased thoracic and lumbar PA glides, decreased hip capsule mobility bilaterally  Palpation: TTP along right glut med and piriformis    Special Tests:Varus: NT     SLR: Neg B    Valgus: NT     Slump: Neg B    Carson's: NT    Baciilo: Positive B    Anterior Drawer: NT    Obers: Positive B    Posterior Drawer: NT    Clonus: Neg B    Lachman's: NT    FABERS: Neg B    20 min Therapeutic Exercise:  [x] See flow sheet :   Rationale: increase ROM, increase strength, improve coordination, improve balance and increase proprioception to improve the patients ability to perform ADLs with less pain or discomfort.           With   [x] TE   [] TA   [] neuro   [x] other: Patient Education: [x] Review HEP    [] Progressed/Changed HEP based on:   [] positioning   [] body mechanics   [] transfers   [] heat/ice application    [x] other:       Other Objective/Functional Measures: FOTO 57/100    Pain Level (0-10 scale) post treatment: 1/10    ASSESSMENT/Changes in Function:     [x]  See Plan of [address], PT 6/27/2017  9:35 AM

## 2017-06-29 NOTE — PROGRESS NOTES
PT DAILY TREATMENT NOTE - [facility] 2-15    Patient Name: Oksana Cuellar  Date:2017  : 1950  [x]  Patient  Verified  Payor: Jessie Aponteton / Plan: [place] MEDICARE PART A & B / Product Type: Medicare /    In time: 4:57 PM  Out time: 5:49 PM  Total Treatment Time (min): 52 minutes  Total Timed Codes (min): 52 minutes  1:1 Treatment Time ( only): 47 minutes   Visit #: 2     Treatment Area: Radiculopathy, lumbar region [M54.16]    SUBJECTIVE  Pain Level (0-10 scale): 1.5/10  Any medication changes, allergies to medications, adverse drug reactions, diagnosis change, or new procedure performed?: [x] No    [] Yes (see summary sheet for update)  Subjective functional status/changes:   [] No changes reported  The Pt denied any increased pain, but did have soreness in her lower back after her initial evaluation. She states that this morning she did her exercises without any medication and did not have any pain, but did feel more pulling in her lower back. She states that her ibuprofen is starting to wear off so she is feeling a throbbing down the front of her thigh and pain in the right hip near the groin and in the back of the hip. OBJECTIVE    37 min Therapeutic Exercise:  [x] See flow sheet :   Rationale: increase ROM, increase strength, improve coordination, improve balance and increase proprioception to improve the patients ability to perform ADLs with less pain or discomfort. 15 min Manual Therapy: Inferior and lateral hip glides using the Mulligan belt bilaterally    Rationale: decrease pain, increase ROM and increase tissue extensibility to improve the patients ability to perform ADLs with less pain or discomfort.     With   [x] TE   [] TA   [] neuro   [] other: Patient Education: [x] Review HEP    [] Progressed/Changed HEP based on:   [] positioning   [] body mechanics   [] transfers   [] heat/ice application    [] other:      Other Objective/Functional Measures: None noted     Pain Level (0-10 scale) post treatment: 0.5/10    ASSESSMENT/Changes in Function:   The Pt tolerated the manual therapy well and her exercise program.  The Pt required mild verbal cuing to make corrections to the form and technique of her HEP, but was able to make the corrections easily. Will progress as tolerated next PT session. Patient will continue to benefit from skilled PT services to modify and progress therapeutic interventions, address functional mobility deficits, address ROM deficits, address strength deficits, analyze and address soft tissue restrictions, analyze and cue movement patterns, analyze and modify body mechanics/ergonomics, assess and modify postural abnormalities and instruct in home and community integration to attain remaining goals. []  See Plan of Care  []  See progress note/recertification  []  See Discharge Summary         Progress towards goals / Updated goals:  Short Term Goals: To be accomplished in 6 treatments:  1. The Pt will be independent and complaint with her HEP. Long Term Goals: To be accomplished in 12 treatments:  1. The Pt will score the MCII on her FOTO survey demonstrating improved overall function (57 to 60 points). 2. The Pt will be able to sit >/= 1 hr without increased lower back pain to allow the Pt to be able to drive with less pain or discomfort. 3. The Pt will be able to bend forward without increased lower back pain to allow the Pt to be able to don/doff shoes and  objects from the ground with less pain or discomfort.     PLAN  [x]  Upgrade activities as tolerated     [x]  Continue plan of care  [x]  Update interventions per flow sheet       []  Discharge due to:_  []  Other:_      Eladio Mo, PT 6/29/2017  5:24 PM

## 2017-07-03 NOTE — PROGRESS NOTES
PT DAILY TREATMENT NOTE - [facility] 2-15    Patient Name: Shawn Maldonado  Date:7/3/2017  : 1950  [x]  Patient  Verified  Payor: [place] MEDICARE / Plan: [place] MEDICARE PART A & B / Product Type: Medicare /    In time: 12:05 PM  Out time: 12:56 PM  Total Treatment Time (min): 51 minutes  Total Timed Codes (min): 51 minutes  1:1 Treatment Time ([facility] only): 45 minutes   Visit #: 3     Treatment Area: Radiculopathy, lumbar region [M54.16]    SUBJECTIVE  Pain Level (0-10 scale): 5/10  Any medication changes, allergies to medications, adverse drug reactions, diagnosis change, or new procedure performed?: [x] No    [] Yes (see summary sheet for update)  Subjective functional status/changes:   [] No changes reported  The Pt reports that she had a very rough weekend. She reports that she is now having pain along the midline of her back that is radiating into her bottom (right > left). OBJECTIVE      51 min Therapeutic Exercise:  [x] See flow sheet :   Rationale: increase ROM, increase strength, improve coordination, improve balance and increase proprioception to improve the patients ability to ambulate and perform ADLs with less pain or discomfort. With   [x] TE   [] TA   [] neuro   [] other: Patient Education: [x] Review HEP    [] Progressed/Changed HEP based on:   [] positioning   [] body mechanics   [] transfers   [] heat/ice application    [] other:      Other Objective/Functional Measures: None noted     Pain Level (0-10 scale) post treatment: 0/10    ASSESSMENT/Changes in Function:   The Pt tolerated the additions to her therapy program well without any increased pain or discomfort. Will progress as tolerated next PT session.   Patient will continue to benefit from skilled PT services to modify and progress therapeutic interventions, address functional mobility deficits, address ROM deficits, address strength deficits, analyze and address soft tissue restrictions, analyze and cue movement patterns, analyze and modify body mechanics/ergonomics, assess and modify postural abnormalities and instruct in home and community integration to attain remaining goals. []  See Plan of Care  []  See progress note/recertification  []  See Discharge Summary         Progress towards goals / Updated goals:  Short Term Goals: To be accomplished in 6 treatments:  1. The Pt will be independent and complaint with her HEP- progressing  Long Term Goals: To be accomplished in 12 treatments:  1. The Pt will score the MCII on her FOTO survey demonstrating improved overall function (57 to 60 points)- progressing  2. The Pt will be able to sit >/= 1 hr without increased lower back pain to allow the Pt to be able to drive with less pain or discomfort- progressing  3.  The Pt will be able to bend forward without increased lower back pain to allow the Pt to be able to don/doff shoes and  objects from the ground with less pain or discomfort- progressing     PLAN  [x]  Upgrade activities as tolerated     [x]  Continue plan of care  [x]  Update interventions per flow sheet       []  Discharge due to:_  []  Other:_      Mara Krause, PT 7/3/2017  12:14 PM

## 2017-07-06 NOTE — PROGRESS NOTES
PT DAILY TREATMENT NOTE - [facility] 2-15    Patient Name: Day Thomason  Date:2017  : 1950  [x]  Patient  Verified  Payor: [place] MED[place] / Plan: [place] MEDICARE PART A & B / Product Type: Medicare /    In time: 11:00 AM  Out time: 12:00 PM  Total Treatment Time (min): 60 minutes  Total Timed Codes (min): 60 minutes  1:1 Treatment Time (MC only): 35 minutes   Visit #: 4     Treatment Area: Radiculopathy, lumbar region [M54.16]    SUBJECTIVE  Pain Level (0-10 scale): 3/10  Any medication changes, allergies to medications, adverse drug reactions, diagnosis change, or new procedure performed?: [x] No    [] Yes (see summary sheet for update)  Subjective functional status/changes:   [] No changes reported  The Pt reports that she is feeling a little better, but feels like she has a fist bawled up in her right buttock. She states she continues to have pain in the right groin, lower back and radiating down the front and back of the right leg. OBJECTIVE    40 min Therapeutic Exercise:  [x] See flow sheet :   Rationale: increase ROM, increase strength, improve coordination, improve balance and increase proprioception to improve the patients ability to ambulate and perform ADLs with less pain or discomfort. 20 min Manual Therapy: STM and trigger point release to right piriformis using the elbow; inferior and lateral hip glides using the Mulligan belt bilaterally    Rationale: decrease pain, increase ROM, increase tissue extensibility and decrease trigger points to improve the patients ability to ambulate and perform ADLs with less pain or discomfort.           With   [x] TE   [] TA   [] neuro   [] other: Patient Education: [x] Review HEP    [] Progressed/Changed HEP based on:   [] positioning   [] body mechanics   [] transfers   [] heat/ice application    [] other:      Other Objective/Functional Measures: None noted     Pain Level (0-10 scale) post treatment: 3/10    ASSESSMENT/Changes in Function:   The Pt tolerated her therapy program well and reported that her right buttock felt much looser and had less discomfort at the end of the session. She is going to check with her pharmacist to see if taking 3, 800 mg ibuprofen 3x a day is recommended for someone her age (this was recommended by her referring provider). Patient will continue to benefit from skilled PT services to modify and progress therapeutic interventions, address functional mobility deficits, address ROM deficits, address strength deficits, analyze and address soft tissue restrictions, analyze and cue movement patterns, analyze and modify body mechanics/ergonomics, assess and modify postural abnormalities and instruct in home and community integration to attain remaining goals. []  See Plan of Care  []  See progress note/recertification  []  See Discharge Summary         Progress towards goals / Updated goals:  Short Term Goals: To be accomplished in 6 treatments:  1. The Pt will be independent and complaint with her HEP- progressing  Long Term Goals: To be accomplished in 12 treatments:  1. The Pt will score the MCII on her FOTO survey demonstrating improved overall function (57 to 60 points)- progressing  2. The Pt will be able to sit >/= 1 hr without increased lower back pain to allow the Pt to be able to drive with less pain or discomfort- progressing  3.  The Pt will be able to bend forward without increased lower back pain to allow the Pt to be able to don/doff shoes and  objects from the ground with less pain or discomfort- progressing     PLAN  [x]  Upgrade activities as tolerated     [x]  Continue plan of care  [x]  Update interventions per flow sheet       []  Discharge due to:_  []  Other:_      Mame Bergman, PT 7/6/2017  11:32 AM

## 2017-07-14 NOTE — PROGRESS NOTES
PT DAILY TREATMENT NOTE - [facility] 2-15    Patient Name: Raudel Gan  Date:2017  : 1950  [x]  Patient  Verified  Payor: Jani Huffman / Plan: [place] MEDICARE PART A & B / Product Type: Medicare /    In time: 10:00 AM  Out time: 11:01 AM  Total Treatment Time (min): 61 minutes  Total Timed Codes (min): 61 minutes  1:1 Treatment Time (MC only): 40 minutes   Visit #: 5     Treatment Area: Radiculopathy, lumbar region [M54.16]    SUBJECTIVE  Pain Level (0-10 scale): 1.5/10  Any medication changes, allergies to medications, adverse drug reactions, diagnosis change, or new procedure performed?: [x] No    [] Yes (see summary sheet for update)  Subjective functional status/changes:   [] No changes reported  The Pt reports that she continues to have a lot of pain when her medication wears off. She has not been diligent with her HEP because she rarely has time for herself after caring for her . OBJECTIVE    61 min Therapeutic Exercise:  [x] See flow sheet :   Rationale: increase ROM, increase strength, improve coordination, improve balance and increase proprioception to improve the patients ability to ambulate and perform ADLs with less pain or discomfort. With   [x] TE   [] TA   [] neuro   [] other: Patient Education: [x] Review HEP    [] Progressed/Changed HEP based on:   [] positioning   [] body mechanics   [] transfers   [] heat/ice application    [] other:      Other Objective/Functional Measures: None noted     Pain Level (0-10 scale) post treatment: 0/10    ASSESSMENT/Changes in Function:   The Pt tolerated her therapy program well and did not have any increased pain or discomfort. Will progress as tolerated next PT session.   Patient will continue to benefit from skilled PT services to modify and progress therapeutic interventions, address functional mobility deficits, address ROM deficits, address strength deficits, analyze and address soft tissue restrictions, analyze and cue movement patterns, analyze and modify body mechanics/ergonomics, assess and modify postural abnormalities and instruct in home and community integration to attain remaining goals. []  See Plan of Care  []  See progress note/recertification  []  See Discharge Summary         Progress towards goals / Updated goals:  Short Term Goals: To be accomplished in 6 treatments:  1. The Pt will be independent and complaint with her HEP- progressing  Long Term Goals: To be accomplished in 12 treatments:  1. The Pt will score the MCII on her FOTO survey demonstrating improved overall function (57 to 60 points)- progressing  2. The Pt will be able to sit >/= 1 hr without increased lower back pain to allow the Pt to be able to drive with less pain or discomfort- progressing  3.  The Pt will be able to bend forward without increased lower back pain to allow the Pt to be able to don/doff shoes and  objects from the ground with less pain or discomfort- progressing    PLAN  [x]  Upgrade activities as tolerated     [x]  Continue plan of care  [x]  Update interventions per flow sheet       []  Discharge due to:_  []  Other:_      Emilia Beckett, PT 7/14/2017  10:17 AM

## 2017-07-19 NOTE — MR AVS SNAPSHOT
Visit Information Date & Time Provider Department Dept. Phone Encounter #  
 7/19/2017 10:15 AM Femi Marte MD [facility] [facility] [phone] [id number] Follow-up Instructions Return in about 4 weeks (around 8/16/2017), or if symptoms worsen or fail to improve. Your Appointments 10/5/2017  9:45 AM  
ROUTINE CARE with Marilynn Espinal NP  
[address]) Appt Note: 6 month follow up [address] [place] [address]  
[phone]  
  
   
 [address] [place] [address] Upcoming Health Maintenance Date Due DTaP/Tdap/Td series (1 - Tdap) 4/19/2019* INFLUENZA AGE 9 TO ADULT 8/1/2017 Pneumococcal 65+ High/Highest Risk (2 of 2 - PCV13) 4/5/2018 MEDICARE YEARLY EXAM 4/6/2018 FOBT Q 1 YEAR AGE 50-75 4/20/2018 GLAUCOMA SCREENING Q2Y 4/5/2019 BREAST CANCER SCRN MAMMOGRAM 4/5/2019 *Topic was postponed. The date shown is not the original due date. Allergies as of 7/19/2017  Review Complete On: 7/19/2017 By: Esteban Alberts Severity Noted Reaction Type Reactions Codeine  09/28/2012    Nausea Only Current Immunizations  Reviewed on 1/30/2013 Name Date Pneumococcal Polysaccharide (PPSV-23) 4/5/2017 Not reviewed this visit You Were Diagnosed With   
  
 Codes Comments Lumbar radiculopathy, acute    -  Primary ICD-10-CM: M54.16 
ICD-9-CM: 724.4 Vitals BP Pulse Temp Resp Height(growth percentile) Weight(growth percentile) 124/74 (BP 1 Location: Left arm, BP Patient Position: Sitting) 86 97.7 °F (36.5 °C) (Oral) 17 5' 8\" (1.727 m) 142 lb (64.4 kg) SpO2 BMI OB Status Smoking Status 98% 21.59 kg/m2 Hysterectomy Never Smoker Vitals History BMI and BSA Data Body Mass Index Body Surface Area  
 21.59 kg/m 2 1.76 m 2 Preferred Pharmacy Pharmacy Name Phone [facility] [address] [facility]  [place] [phone] Your Updated Medication List  
  
   
This list is accurate as of: 7/19/17 11:42 AM.  Always use your most recent med list.  
  
  
  
  
 ASPIRIN PO Take 81 mg by mouth daily. Takes 81 mg po occasionally. diclofenac potassium 50 mg tablet Commonly known as:  CATAFLAM  
Take 1 Tab by mouth three (3) times daily. hydroCHLOROthiazide 25 mg tablet Commonly known as:  HYDRODIURIL  
TAKE 1 TABLET BY MOUTH DAILY AFTER BREAKFAST  
  
 ibuprofen 800 mg tablet Commonly known as:  MOTRIN  
TAKE 1 TABLET BY MOUTH TWICE DAILY AFTER MEALS  
  
 multivitamin tablet Commonly known as:  ONE A DAY Take 1 Tab by mouth daily (after breakfast). Takes when remembered. predniSONE 10 mg dose pack Commonly known as:  STERAPRED DS As directed Follow-up Instructions Return in about 4 weeks (around 8/16/2017), or if symptoms worsen or fail to improve. To-Do List   
 07/19/2017 Imaging:  MRI LUMB SPINE W WO CONT   
  
 07/20/2017 11:30 AM  
  Appointment with Brinda Conley PT at \[facility]\"" OP PT ([phone]) Please remember to arrive at the hospital at least 30 minutes prior to your scheduled appointment time. When you come in for your appointment, please be sure to bring the therapy prescription the doctor gave you, your insurance card, and a list of the medicines you are taking. Also, please remember to wear comfortable, loose- fitting clothes. We look forward to seeing you. 07/24/2017 1:00 PM  
  Appointment with Brinda Conley PT at \[facility]\"" OP PT ([phone]) Please remember to arrive at the hospital at least 30 minutes prior to your scheduled appointment time. When you come in for your appointment, please be sure to bring the therapy prescription the doctor gave you, your insurance card, and a list of the medicines you are taking.   Also, please remember to wear comfortable, loose- fitting clothes. We look forward to seeing you. 2017 11:00 AM  
  Appointment with Elvia Avila PT at [facility] ([phone]) Please remember to arrive at the hospital at least 30 minutes prior to your scheduled appointment time. When you come in for your appointment, please be sure to bring the therapy prescription the doctor gave you, your insurance card, and a list of the medicines you are taking. Also, please remember to wear comfortable, loose- fitting clothes. We look forward to seeing you. Patient Instructions [employer] Activation Thank you for requesting access to [employer]. Please follow the instructions below to securely access and download your online medical record. [employer] allows you to send messages to your doctor, view your test results, renew your prescriptions, schedule appointments, and more. How Do I Sign Up? 1. In your internet browser, go to [email or web address] 
2. Click on the First Time User? Click Here link in the Sign In box. You will be redirect to the New Member Sign Up page. 3. Enter your [employer] Access Code exactly as it appears below. You will not need to use this code after youve completed the sign-up process. If you do not sign up before the expiration date, you must request a new code. [employer] Access Code: Activation code not generated Current [employer] Status: Active (This is the date your [employer] access code will ) 4. Enter the last four digits of your Social Security Number (xxxx) and Date of Birth (mm/dd/yyyy) as indicated and click Submit. You will be taken to the next sign-up page. 5. Create a [employer] ID. This will be your [employer] login ID and cannot be changed, so think of one that is secure and easy to remember. 6. Create a [employer] password. You can change your password at any time. 7. Enter your Password Reset Question and Answer.  This can be used at a later time if you forget your password. 8. Enter your e-mail address. You will receive e-mail notification when new information is available in [address] 9. Click Sign Up. You can now view and download portions of your medical record. 10. Click the Download Summary menu link to download a portable copy of your medical information. Additional Information If you have questions, please visit the Frequently Asked Questions section of the [employer] website at [email or web address] Remember, [employer] is NOT to be used for urgent needs. For medical emergencies, dial 911. Introducing \[facility] [place] [facility]\"" & HEALTH SERVICES! Dear Esmeralda Ahumada: Thank you for requesting a [employer] account. Our records indicate that you already have an active [employer] account. You can access your account anytime at [email or web address] Did you know that you can access your hospital and ER discharge instructions at any time in [employer]? You can also review all of your test results from your hospital stay or ER visit. Additional Information If you have questions, please visit the Frequently Asked Questions section of the [employer] website at [email or web address] Remember, [employer] is NOT to be used for urgent needs. For medical emergencies, dial 911. Now available from your iPhone and Android! Please provide this summary of care documentation to your next provider. Your primary care clinician is listed as Prabha Busch. If you have any questions after today's visit, please call [phone].

## 2017-07-19 NOTE — PROGRESS NOTES
Raudel Gan is a 77 y.o. female and presents with Back Pain  . Subjective:    Back Pain Review:  Patient presents for evaluation of low back problems. Symptoms have been present for several weeks and include pain in lower back (dull, mild in character; 3-4/10 in severity). Initial inciting event: unknown. Symptoms are worst: at times. Alleviating factors identifiable by patient are lying flat, medication . Exacerbating factors identifiable by patient are bending forwards, bending backwards. Treatments so far initiated by patient: medication Previous lower back problems: reported. Previous workup: x-rays. she has pains radiating down the rt leg with a numbness in the thigh reported ,she has not had relief with physical therapy. Review of Systems  Constitutional: negative for fevers, chills, anorexia and weight loss  Eyes:   negative for visual disturbance and irritation  ENT:   negative for tinnitus,sore throat,nasal congestion,ear pains. hoarseness  Respiratory:  negative for cough, hemoptysis, dyspnea,wheezing  CV:   negative for chest pain, palpitations, lower extremity edema  GI:   negative for nausea, vomiting, diarrhea, abdominal pain,melena  Endo:               negative for polyuria,polydipsia,polyphagia,heat intolerance  Genitourinary: negative for frequency, dysuria and hematuria  Integument:  negative for rash and pruritus  Hematologic:  negative for easy bruising and gum/nose bleeding  Musculoskel: myalgias, arthralgias,  joint pain  Neurological:  negative for headaches, dizziness, vertigo, memory problems and gait   Behavl/Psych: negative for feelings of anxiety, depression, mood changes    Past Medical History:   Diagnosis Date    Anxiety and depression     Breast CA ([facility] [address] 2012    Left only but had a bilateral mastectomy/chemo    GERD (gastroesophageal reflux disease)     not an issure, no medications    Hypertension     Ill-defined condition     pinched nerve in back    Other ill-defined conditions     last chemo treatment 1/30/13    Sickle cell trait [facility])      Past Surgical History:   Procedure Laterality Date    ABDOMEN SURGERY PROC UNLISTED      hernia as child    BREAST SURGERY PROCEDURE UNLISTED  10/11/12    LEFT SLNB and port-a-cath insertion    BREAST SURGERY PROCEDURE UNLISTED      bilateral mastectomy with reconstruction- states not arm restricted    COLONOSCOPY N/A 5/10/2017    COLONOSCOPY performed by Cale Schultz MD at [address] HX BREAST RECONSTRUCTION  7/1/2013    BREAST TISSUE EXPANDER REMOVAL performed by Tati Richards MD at [address] [facility] [address] HX LYMPHADENECTOMY  October 2012    sentinel node biopsy, negative    HX ORTHOPAEDIC Bilateral     ground down the bone d/t pressure on toes from shoes    HX VASCULAR ACCESS      insertion and removal    HX WISDOM TEETH EXTRACTION       Social History     Social History    Marital status:      Spouse name: N/A    Number of children: N/A    Years of education: N/A     Social History Main Topics    Smoking status: Never Smoker    Smokeless tobacco: Never Used    Alcohol use No    Drug use: Yes     Special: Marijuana      Comment: 7 MONTHS AGO    Sexual activity: Not Asked     Other Topics Concern    None     Social History Narrative     Family History   Problem Relation Age of Onset    Cancer Mother      colon cancer    Heart Disease Father     Diabetes Father     Cancer Sister 27     breast cancer    MS Sister     Cancer Other 36     breast cancer    Cancer Maternal Grandfather      MOUTH/THROAT    Cancer Paternal Grandmother      BRAIN    Sickle Cell Anemia Brother     Malignant Hyperthermia Neg Hx     Pseudocholinesterase Deficiency Neg Hx     Delayed Awakening Neg Hx     Post-op Nausea/Vomiting Neg Hx     Emergence Delirium Neg Hx     Post-op Cognitive Dysfunction Neg Hx     Other Neg Hx      Current Outpatient Prescriptions   Medication Sig Dispense Refill    ibuprofen (MOTRIN) 800 mg tablet TAKE 1 TABLET BY MOUTH TWICE DAILY AFTER MEALS 180 Tab 12    ASPIRIN PO Take 81 mg by mouth daily. Takes 81 mg po occasionally.  hydroCHLOROthiazide (HYDRODIURIL) 25 mg tablet TAKE 1 TABLET BY MOUTH DAILY AFTER BREAKFAST 30 Tab 0    multivitamin (ONE A DAY) tablet Take 1 Tab by mouth daily (after breakfast). Takes when remembered.  diclofenac potassium (CATAFLAM) 50 mg tablet Take 1 Tab by mouth three (3) times daily. 30 Tab 0    predniSONE (STERAPRED DS) 10 mg dose pack As directed 21 Tab 0     Allergies   Allergen Reactions    Codeine Nausea Only       Objective:  Visit Vitals    /74 (BP 1 Location: Left arm, BP Patient Position: Sitting)    Pulse 86    Temp 97.7 °F (36.5 °C) (Oral)    Resp 17    Ht 5' 8\" (1.727 m)    Wt 142 lb (64.4 kg)    SpO2 98%    BMI 21.59 kg/m2     Physical Exam:   General appearance - alert, well appearing, and in no distress  Mental status - alert, oriented to person, place, and time  EYE-SERA, EOMI, corneas normal, no foreign bodies  ENT-ENT exam normal, no neck nodes or sinus tenderness  Nose - normal and patent, no erythema, discharge or polyps  Mouth - mucous membranes moist, pharynx normal without lesions  Neck - supple, no significant adenopathy   Chest - clear to auscultation, no wheezes, rales or rhonchi, symmetric air entry   Heart - normal rate, regular rhythm, normal S1, S2, no murmurs, rubs, clicks or gallops   Abdomen - soft, nontender, nondistended, no masses or organomegaly  Lymph- no adenopathy palpable  Ext-peripheral pulses normal, no pedal edema, no clubbing or cyanosis  Skin-Warm and dry.  no hyperpigmentation, vitiligo, or suspicious lesions  Neuro -alert, oriented, normal speech, no focal findings or movement disorder noted  Neck-normal C-spine, no tenderness, full ROM without pain  Feet-no nail deformities or callus formation with good pulses noted  Back-tenderness lower lumbar spine and sacral spine noted,forward flexion,hyperextension impaired,negative straight leg raise        Results for orders placed or performed in visit on 06/21/17   CBC W/O DIFF   Result Value Ref Range    WBC 4.8 3.4 - 10.8 x10E3/uL    RBC 3.55 (L) 3.77 - 5.28 x10E6/uL    HGB 11.1 11.1 - 15.9 g/dL    HCT 34.0 34.0 - 46.6 %    MCV 96 79 - 97 fL    MCH 31.3 26.6 - 33.0 pg    MCHC 32.6 31.5 - 35.7 g/dL    RDW 13.1 12.3 - 15.4 %    PLATELET 585 642 - 211 U82L2/HT   METABOLIC PANEL, COMPREHENSIVE   Result Value Ref Range    Glucose 85 65 - 99 mg/dL    BUN 17 8 - 27 mg/dL    Creatinine 0.75 0.57 - 1.00 mg/dL    GFR est non-AA 83 >59 mL/min/1.73    GFR est AA 96 >59 mL/min/1.73    BUN/Creatinine ratio 23 12 - 28    Sodium 139 134 - 144 mmol/L    Potassium 5.0 3.5 - 5.2 mmol/L    Chloride 98 96 - 106 mmol/L    CO2 26 18 - 29 mmol/L    Calcium 9.8 8.7 - 10.3 mg/dL    Protein, total 7.7 6.0 - 8.5 g/dL    Albumin 4.1 3.6 - 4.8 g/dL    GLOBULIN, TOTAL 3.6 1.5 - 4.5 g/dL    A-G Ratio 1.1 (L) 1.2 - 2.2    Bilirubin, total 0.4 0.0 - 1.2 mg/dL    Alk. phosphatase 77 39 - 117 IU/L    AST (SGOT) 14 0 - 40 IU/L    ALT (SGPT) 6 0 - 32 IU/L   AMB POC LIPID PROFILE   Result Value Ref Range    Cholesterol (POC) 217     Triglycerides (POC) 191     HDL Cholesterol (POC) 73     LDL Cholesterol (POC) 144 MG/DL    Non-HDL Goal (POC) 144     TChol/HDL Ratio (POC) 3.0    AMB POC GLUCOSE BLOOD, BY GLUCOSE MONITORING DEVICE   Result Value Ref Range    Glucose POC 94 mg/dL       Assessment/Plan:    ICD-10-CM ICD-9-CM    1. Lumbar radiculopathy, acute M54.16 724.4 MRI LUMB SPINE W WO CONT     Orders Placed This Encounter    MRI LUMB SPINE W WO CONT     Standing Status:   Future     Standing Expiration Date:   8/19/2018     Order Specific Question:   Is Patient Allergic to Contrast Dye? Answer:   No     continue present plan,Take 81mg aspirin daily  Patient Instructions   MyChart Activation    Thank you for requesting access to [employer].  Please follow the instructions below to securely access and download your online medical record. Tutamee allows you to send messages to your doctor, view your test results, renew your prescriptions, schedule appointments, and more. How Do I Sign Up? 1. In your internet browser, go to [email or web address]  2. Click on the First Time User? Click Here link in the Sign In box. You will be redirect to the New Member Sign Up page. 3. Enter your Tutamee Access Code exactly as it appears below. You will not need to use this code after youve completed the sign-up process. If you do not sign up before the expiration date, you must request a new code. Tutamee Access Code: Activation code not generated  Current Tutamee Status: Active (This is the date your Tutamee access code will )    4. Enter the last four digits of your Social Security Number (xxxx) and Date of Birth (mm/dd/yyyy) as indicated and click Submit. You will be taken to the next sign-up page. 5. Create a Tutamee ID. This will be your Tutamee login ID and cannot be changed, so think of one that is secure and easy to remember. 6. Create a Tutamee password. You can change your password at any time. 7. Enter your Password Reset Question and Answer. This can be used at a later time if you forget your password. 8. Enter your e-mail address. You will receive e-mail notification when new information is available in [address] 9. Click Sign Up. You can now view and download portions of your medical record. 10. Click the Download Summary menu link to download a portable copy of your medical information. Additional Information    If you have questions, please visit the Frequently Asked Questions section of the Tutamee website at [email or web address] Remember, Tutamee is NOT to be used for urgent needs. For medical emergencies, dial 911.            Follow-up Disposition:  Return in about 4 weeks (around 2017), or if symptoms worsen or fail to improve. I have reviewed with the patient details of the assessment and plan and all questions were answered. Relevent patient education was performed. The most recent lab findings were reviewed with the patient. An After Visit Summary was printed and given to the patient.

## 2017-07-19 NOTE — PATIENT INSTRUCTIONS
VisonysharMileIQ Activation    Thank you for requesting access to dVisit. Please follow the instructions below to securely access and download your online medical record. dVisit allows you to send messages to your doctor, view your test results, renew your prescriptions, schedule appointments, and more. How Do I Sign Up? 1. In your internet browser, go to [email or web address]  2. Click on the First Time User? Click Here link in the Sign In box. You will be redirect to the New Member Sign Up page. 3. Enter your dVisit Access Code exactly as it appears below. You will not need to use this code after youve completed the sign-up process. If you do not sign up before the expiration date, you must request a new code. dVisit Access Code: Activation code not generated  Current dVisit Status: Active (This is the date your dVisit access code will )    4. Enter the last four digits of your Social Security Number (xxxx) and Date of Birth (mm/dd/yyyy) as indicated and click Submit. You will be taken to the next sign-up page. 5. Create a dVisit ID. This will be your dVisit login ID and cannot be changed, so think of one that is secure and easy to remember. 6. Create a dVisit password. You can change your password at any time. 7. Enter your Password Reset Question and Answer. This can be used at a later time if you forget your password. 8. Enter your e-mail address. You will receive e-mail notification when new information is available in [address] 9. Click Sign Up. You can now view and download portions of your medical record. 10. Click the Download Summary menu link to download a portable copy of your medical information. Additional Information    If you have questions, please visit the Frequently Asked Questions section of the dVisit website at [email or web address] Remember, dVisit is NOT to be used for urgent needs. For medical emergencies, dial 911.

## 2017-07-20 NOTE — PROGRESS NOTES
PT DAILY TREATMENT NOTE - [facility] 2-15    Patient Name: Izabel Emerson  Date:2017  : 1950  [x]  Patient  Verified  [place]or: [place] MED[place] / Plan: [place] MEDICARE PART A & B / Product Type: Medicare /    In time: 11:30 AM  Out time: 12:30 PM  Total Treatment Time (min): 60 minutes  Total Timed Codes (min): 60 minutes  1:1 Treatment Time ( only): 30 minutes   Visit #: 6     Treatment Area: Radiculopathy, lumbar region [M54.16]    SUBJECTIVE  Pain Level (0-10 scale): 2.5/10  Any medication changes, allergies to medications, adverse drug reactions, diagnosis change, or new procedure performed?: [x] No    [] Yes (see summary sheet for update)  Subjective functional status/changes:   [] No changes reported  The Pt reports that her pain is continuing to cause her significant discomfort and she has a lot of pain when her medication wears off. She states that she continues to wake up around 2-3 AM in the morning with pain. She has tried with the exercises and nothing seems to provide her any relief. Her doctor has recommended that she get an MRI to determine what is going on. She states that her pain fluctuates and is randomly irritated by different activities and then randomly she will get relief with different activities. 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 She would like for today to be her last day in skilled PT and continue independently with her HEP. OBJECTIVE    60 min Therapeutic Exercise:  [x] See flow sheet :   Rationale: increase ROM, increase strength, improve coordination, improve balance and increase proprioception to improve the patients ability to perform ADLs with less pain or discomfort.     With   [x] TE   [] TA   [] neuro   [x] other: Patient Education: [x] Review HEP    [] Progressed/Changed HEP based on:   [] positioning   [] body mechanics   [] transfers   [] heat/ice application    [x] other: Pt educated how to safely progress her HEP independently. Other Objective/Functional Measures:  FOTO 50/100 (57/100 at initial evaluation)     Pain Level (0-10 scale) post treatment: 0/10    ASSESSMENT/Changes in Function:      []  See Plan of Care  []  See progress note/recertification  [x]  See Discharge Summary         Progress towards goals / Updated goals:  Short Term Goals: To be accomplished in 6 treatments:  1. The Pt will be independent and complaint with her HEP- met  Long Term Goals: To be accomplished in 12 treatments:  1. The Pt will score the MCII on her FOTO survey demonstrating improved overall function (57 to 60 points)- not met (FOTO 50/100 at discharge)  2. The Pt will be able to sit >/= 1 hr without increased lower back pain to allow the Pt to be able to drive with less pain or discomfort- progressing (is randrom  3. The Pt will be able to bend forward without increased lower back pain to allow the Pt to be able to don/doff shoes and  objects from the ground with less pain or discomfort- progressing    PLAN  []  Upgrade activities as tolerated     []  Continue plan of care  []  Update interventions per flow sheet       [x]  Discharge due to: Pt has plateaued in her improvement and not receiving any relief from pain.   []  Other:_      Coral Muse, PT 7/20/2017  11:48 AM

## 2017-07-20 NOTE — ANCILLARY DISCHARGE INSTRUCTIONS
[place] [place] [address] (MOB IV), [address] [facility],  [address]  Phone: [phone] Fax: [phone]    Discharge Summary 2-15    Patient name: Vitor Oates  : 1950  Provider#: [id number]  Referral source: Bette Diaz MD      Medical/Treatment Diagnosis: Radiculopathy, lumbar region [M54.16]     Prior Hospitalization: see medical history     Comorbidities: See Plan of Care  Prior Level of Function: See Plan of Care  Medications: Verified on Patient Summary List    Start of Care: 17      Onset Date: 5/3/17   Visits from Start of Care: 6     Missed Visits: 0  Reporting Period : 17 to 17    Assessment/Summary of care: The Pt has been seen for 6 outpatient physical therapy sessions with right-sided lumbar radiculopathy. The Pt has not made any progress towards her therapeutic goals and not progress well with her therapy program that has focused on improving her lower extremity strength and stability, improving her core strength and stability, stretching her lower back and hip musculature, improving her balance and neuromuscular control, improving her gluteal activation with dynamic activities, and improving her activity tolerance and endurance via therapeutic exercises and manual therapy techniques. 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 She states that she would like to continue with her exercises independently and stopped skilled PT because she does not have enough time and it is not helping. She was educated how to safely progress her HEP independently and voiced understanding. The Pt is discharged from skilled PT and will contact her referring provider with any further questions or concerns.   Thank you for this referral.    Progress towards goals / Updated goals:  Short Term Goals: To be accomplished in 6 treatments:  1. The Pt will be independent and complaint with her HEP- met  Long Term Goals: To be accomplished in 12 treatments:  1. The Pt will score the MCII on her FOTO survey demonstrating improved overall function (57 to 60 points)- not met (FOTO 50/100 at discharge)  2. The Pt will be able to sit >/= 1 hr without increased lower back pain to allow the Pt to be able to drive with less pain or discomfort- progressing (is randrom  3. The Pt will be able to bend forward without increased lower back pain to allow the Pt to be able to don/doff shoes and  objects from the ground with less pain or discomfort- progressing    G-Codes (GP)  Position   Goal  CJ= 20-39%   D/C  CK= 40-59%    The severity rating is based on clinical judgment and the FOTO Score score.     RECOMMENDATIONS:  [x]Discontinue therapy: []Patient has reached or is progressing toward set goals     []Patient is non-compliant or has abdicated     [x]Due to lack of appreciable progress towards set goals     []Other  Simba Fallon, PT 7/20/2017 2:47 PM

## 2017-08-01 NOTE — PATIENT INSTRUCTIONS
NEMOPTICharMonogram Activation    Thank you for requesting access to [facility]atKarma. Please follow the instructions below to securely access and download your online medical record. SeatKarma allows you to send messages to your doctor, view your test results, renew your prescriptions, schedule appointments, and more. How Do I Sign Up? 1. In your internet browser, go to [email or web address]  2. Click on the First Time User? Click Here link in the Sign In box. You will be redirect to the New Member Sign Up page. 3. Enter your SeatKarma Access Code exactly as it appears below. You will not need to use this code after youve completed the sign-up process. If you do not sign up before the expiration date, you must request a new code. SeatKarma Access Code: Activation code not generated  Current SeatKarma Status: Active (This is the date your SeatKarma access code will )    4. Enter the last four digits of your Social Security Number (xxxx) and Date of Birth (mm/dd/yyyy) as indicated and click Submit. You will be taken to the next sign-up page. 5. Create a SeatKarma ID. This will be your SeatKarma login ID and cannot be changed, so think of one that is secure and easy to remember. 6. Create a SeatKarma password. You can change your password at any time. 7. Enter your Password Reset Question and Answer. This can be used at a later time if you forget your password. 8. Enter your e-mail address. You will receive e-mail notification when new information is available in [address] 9. Click Sign Up. You can now view and download portions of your medical record. 10. Click the Download Summary menu link to download a portable copy of your medical information. Additional Information    If you have questions, please visit the Frequently Asked Questions section of the SeatKarma website at [email or web address] Remember, SeatKarma is NOT to be used for urgent needs. For medical emergencies, dial 911.

## 2017-08-01 NOTE — MR AVS SNAPSHOT
Visit Information Date & Time Provider Department Dept. Phone Encounter #  
 8/1/2017  9:30 AM Femi Marte MD [address] Shandra French [phone] [id number] Follow-up Instructions Return in about 4 weeks (around 8/29/2017), or if symptoms worsen or fail to improve. Your Appointments 8/17/2017 10:45 AM  
ROUTINE CARE with Femi Marte MD  
[facility] Shandra French ([facility]) Appt Note: 6 month follow up; 1 month check up [facility] [address] [address]  
[phone]  
  
   
 [address] [place] [id number] Upcoming Health Maintenance Date Due INFLUENZA AGE 9 TO ADULT 8/1/2017 DTaP/Tdap/Td series (1 - Tdap) 4/19/2019* Pneumococcal 65+ High/Highest Risk (2 of 2 - PCV13) 4/5/2018 MEDICARE YEARLY EXAM 4/6/2018 FOBT Q 1 YEAR AGE 50-75 4/20/2018 GLAUCOMA SCREENING Q2Y 4/5/2019 BREAST CANCER SCRN MAMMOGRAM 4/5/2019 *Topic was postponed. The date shown is not the original due date. Allergies as of 8/1/2017  Review Complete On: 8/1/2017 By: Femi Marte MD  
  
 Severity Noted Reaction Type Reactions Codeine  09/28/2012    Nausea Only Current Immunizations  Reviewed on 1/30/2013 Name Date Pneumococcal Polysaccharide (PPSV-23) 4/5/2017 Not reviewed this visit You Were Diagnosed With   
  
 Codes Comments Metastatic cancer to bone [facility])    -  Primary ICD-10-CM: C79.51 
ICD-9-CM: 198.5 Lumbar radiculopathy, acute     ICD-10-CM: M54.16 
ICD-9-CM: 724.4 Essential hypertension, benign     ICD-10-CM: I10 
ICD-9-CM: 401.1 Breast cancer, stage 2, unspecified laterality ([facility]     ICD-10-CM: C50.919 ICD-9-CM: 174.9 Vitals BP Pulse Temp Resp Height(growth percentile) Weight(growth percentile) 100/68 97 98.3 °F (36.8 °C) (Oral) 14 5' 8\" (1.727 m) 139 lb (63 kg) SpO2 BMI OB Status Smoking Status 98% 21.13 kg/m2 Hysterectomy Never Smoker BMI and BSA Data Body Mass Index Body Surface Area  
 21.13 kg/m 2 1.74 m 2 Preferred Pharmacy Pharmacy Name Phone [facility] [address]  [place] [phone] Your Updated Medication List  
  
   
This list is accurate as of: 8/1/17 10:49 AM.  Always use your most recent med list.  
  
  
  
  
 ASPIRIN PO Take 81 mg by mouth daily. Takes 81 mg po occasionally. hydroCHLOROthiazide 25 mg tablet Commonly known as:  HYDRODIURIL  
TAKE 1 TABLET BY MOUTH DAILY AFTER BREAKFAST  
  
 ibuprofen 800 mg tablet Commonly known as:  MOTRIN  
TAKE 1 TABLET BY MOUTH TWICE DAILY AFTER MEALS  
  
 multivitamin tablet Commonly known as:  ONE A DAY Take 1 Tab by mouth daily (after breakfast). Takes when remembered. oxyCODONE-acetaminophen 5-325 mg per tablet Commonly known as:  PERCOCET Take 1 Tab by mouth every four (4) hours as needed for Pain. Max Daily Amount: 6 Tabs. predniSONE 10 mg dose pack Commonly known as:  STERAPRED DS As directed Prescriptions Printed Refills  
 oxyCODONE-acetaminophen (PERCOCET) 5-325 mg per tablet 0 Sig: Take 1 Tab by mouth every four (4) hours as needed for Pain. Max Daily Amount: 6 Tabs. Class: Print Route: Oral  
  
We Performed the Following REFERRAL TO ONCOLOGY [RKW08 Custom] Follow-up Instructions Return in about 4 weeks (around 8/29/2017), or if symptoms worsen or fail to improve. Referral Information Referral ID Referred By Referred To  
  
 [id number]   
   [address], [address] [place] [address] Kayy Visits Status Start Date End Date 1 New Request 8/1/17 8/1/18  If your referral has a status of pending review or denied, additional information will be sent to support the outcome of this decision. Patient Instructions MyChart Activation Thank you for requesting access to Kazaana. Please follow the instructions below to securely access and download your online medical record. Kazaana allows you to send messages to your doctor, view your test results, renew your prescriptions, schedule appointments, and more. How Do I Sign Up? 1. In your internet browser, go to [email or web address] 
2. Click on the First Time User? Click Here link in the Sign In box. You will be redirect to the New Member Sign Up page. 3. Enter your Kazaana Access Code exactly as it appears below. You will not need to use this code after youve completed the sign-up process. If you do not sign up before the expiration date, you must request a new code. Kazaana Access Code: Activation code not generated Current Kazaana Status: Active (This is the date your Kazaana access code will ) 4. Enter the last four digits of your Social Security Number (xxxx) and Date of Birth (mm/dd/yyyy) as indicated and click Submit. You will be taken to the next sign-up page. 5. Create a Kazaana ID. This will be your Kazaana login ID and cannot be changed, so think of one that is secure and easy to remember. 6. Create a Kazaana password. You can change your password at any time. 7. Enter your Password Reset Question and Answer. This can be used at a later time if you forget your password. 8. Enter your e-mail address. You will receive e-mail notification when new information is available in [address] 9. Click Sign Up. You can now view and download portions of your medical record. 10. Click the Download Summary menu link to download a portable copy of your medical information. Additional Information If you have questions, please visit the Frequently Asked Questions section of the Kazaana website at [email or web address] Remember, View Medicalt is NOT to be used for urgent needs. For medical emergencies, dial 911. Introducing [facility] SERVICES! Dear Kemar Cross: Thank you for requesting a Three Ring account. Our records indicate that you already have an active Three Ring account. You can access your account anytime at [email or web address] healthcare [email or web address]. [email or web address]/united healthcare practice solutions Did you know that you can access your hospital and ER discharge instructions at any time in Three Ring? You can also review all of your test results from your hospital stay or ER visit. Additional Information If you have questions, please visit the Frequently Asked Questions section of the Three Ring website at [email or web address] Remember, Three Ring is NOT to be used for urgent needs. For medical emergencies, dial 911. Now available from your iPhone and Android! Please provide this summary of care documentation to your next provider. Your primary care clinician is listed as Crow Marmolejo. If you have any questions after today's visit, please call [phone].

## 2017-08-01 NOTE — PROGRESS NOTES
Ramona Bahena is a 77 y.o. female and presents with Results (mri)  . Subjective:    Back Pain Review:  Patient presents for evaluation of low back problems. Symptoms have been present for months and include pain in lower back (dull, mild in character;8/10 in severity). Initial inciting event: unknown. Symptoms are worst: at times. Alleviating factors identifiable by patient are lying flat, medication . Exacerbating factors identifiable by patient are bending forwards, bending backwards. Treatments so far initiated by patient: medication Previous lower back problems: reported. Previous workup: x-rays. she has pains radiating down the rt leg with a numbness in the thigh reported ,she has not had relief with physical therapy. she had a recent mri that revealed metastatic disease in the spine and liver  She has a previous history of breast cancer    Hypertension Review:  The patient has essential hypertension  Diet and Lifestyle: generally follows a  low sodium diet, exercises sporadically  Home BP Monitoring: is not measured at home. Pertinent ROS: taking medications as instructed, no medication side effects noted, no TIA's, no chest pain on exertion, no dyspnea on exertion, no swelling of ankles. Review of Systems  Constitutional: negative for fevers, chills, anorexia and weight loss  Eyes:   negative for visual disturbance and irritation  ENT:   negative for tinnitus,sore throat,nasal congestion,ear pains. hoarseness  Respiratory:  negative for cough, hemoptysis, dyspnea,wheezing  CV:   negative for chest pain, palpitations, lower extremity edema  GI:   negative for nausea, vomiting, diarrhea, abdominal pain,melena  Endo:               negative for polyuria,polydipsia,polyphagia,heat intolerance  Genitourinary: negative for frequency, dysuria and hematuria  Integument:  negative for rash and pruritus  Hematologic:  negative for easy bruising and gum/nose bleeding  Musculoskel: myalgias, arthralgias, joint pain  Neurological:  negative for headaches, dizziness, vertigo, memory problems and gait   Behavl/Psych: negative for feelings of anxiety, depression, mood changes    Past Medical History:   Diagnosis Date    Anxiety and depression     [facility]    Left only but had a bilateral mastectomy/chemo    GERD (gastroesophageal reflux disease)     not an issure, no medications    Hypertension     Ill-defined condition     pinched nerve in back    Other ill-defined conditions     last chemo treatment 1/30/13    Sickle cell trait ([facility]      Past Surgical History:   Procedure Laterality Date    ABDOMEN SURGERY PROC UNLISTED      hernia as child    BREAST SURGERY PROCEDURE UNLISTED  10/11/12    LEFT SLNB and port-a-cath insertion    BREAST SURGERY PROCEDURE UNLISTED      bilateral mastectomy with reconstruction- states not arm restricted    COLONOSCOPY N/A 5/10/2017    COL[facility] ENDOSCOPY    HX BREAST RECONSTRUCTION  7/1/2013    BREAST TISSUE EXPANDER REMOVAL performed by Anibal Quintero MD at [address] [place] [address] [address] HX LYMPHADENECTOMY  October 2012    sentinel node biopsy, negative    HX ORTHOPAEDIC Bilateral     ground down the bone d/t pressure on toes from shoes    HX VASCULAR ACCESS      insertion and removal    HX WISDOM TEETH EXTRACTION       Social History     Social History    Marital status:      Spouse name: N/A    Number of children: N/A    Years of education: N/A     Social History Main Topics    Smoking status: Never Smoker    Smokeless tobacco: Never Used    Alcohol use No    Drug use: Yes     Special: Marijuana      Comment: 7 MONTHS AGO    Sexual activity: Not Asked     Other Topics Concern    None     Social History Narrative     Family History   Problem Relation Age of Onset    Cancer Mother      colon cancer    Heart Disease Father     Diabetes Father     Cancer Sister 27     breast cancer    MS Sister     Cancer Other 36     breast cancer    Cancer Maternal Grandfather      MOUTH/THROAT    Cancer Paternal Grandmother      BRAIN    Sickle Cell Anemia Brother     Malignant Hyperthermia Neg Hx     Pseudocholinesterase Deficiency Neg Hx     Delayed Awakening Neg Hx     Post-op Nausea/Vomiting Neg Hx     Emergence Delirium Neg Hx     Post-op Cognitive Dysfunction Neg Hx     Other Neg Hx      Current Outpatient Prescriptions   Medication Sig Dispense Refill    oxyCODONE-acetaminophen (PERCOCET) 5-325 mg per tablet Take 1 Tab by mouth every four (4) hours as needed for Pain. Max Daily Amount: 6 Tabs. 30 Tab 0    ibuprofen (MOTRIN) 800 mg tablet TAKE 1 TABLET BY MOUTH TWICE DAILY AFTER MEALS 180 Tab 12    hydroCHLOROthiazide (HYDRODIURIL) 25 mg tablet TAKE 1 TABLET BY MOUTH DAILY AFTER BREAKFAST 30 Tab 0    multivitamin (ONE A DAY) tablet Take 1 Tab by mouth daily (after breakfast). Takes when remembered.  predniSONE (STERAPRED DS) 10 mg dose pack As directed 21 Tab 0    ASPIRIN PO Take 81 mg by mouth daily. Takes 81 mg po occasionally.         Allergies   Allergen Reactions    Codeine Nausea Only       Objective:  Visit Vitals    /68    Pulse 97    Temp 98.3 °F (36.8 °C) (Oral)    Resp 14    Ht 5' 8\" (1.727 m)    Wt 139 lb (63 kg)    SpO2 98%    BMI 21.13 kg/m2     Physical Exam:   General appearance - alert, well appearing, and in no distress  Mental status - alert, oriented to person, place, and time  EYE-SERA, EOMI, corneas normal, no foreign bodies  ENT-ENT exam normal, no neck nodes or sinus tenderness  Nose - normal and patent, no erythema, discharge or polyps  Mouth - mucous membranes moist, pharynx normal without lesions  Neck - supple, no significant adenopathy   Chest - clear to auscultation, no wheezes, rales or rhonchi, symmetric air entry   Heart - normal rate, regular rhythm, normal S1, S2, no murmurs, rubs, clicks or gallops   Abdomen - soft, nontender, nondistended, no masses or organomegaly  Lymph- no adenopathy palpable  Ext-peripheral pulses normal, no pedal edema, no clubbing or cyanosis  Skin-Warm and dry. no hyperpigmentation, vitiligo, or suspicious lesions  Neuro -alert, oriented, normal speech, no focal findings or movement disorder noted  Neck-normal C-spine, no tenderness, full ROM without pain  Feet-no nail deformities or callus formation with good pulses noted  Back-tenderness lower lumbar spine and sacral spine noted,forward flexion,hyperextension impaired,negative straight leg raise        Results for orders placed or performed in visit on 06/21/17   CBC W/O DIFF   Result Value Ref Range    WBC 4.8 3.4 - 10.8 x10E3/uL    RBC 3.55 (L) 3.77 - 5.28 x10E6/uL    HGB 11.1 11.1 - 15.9 g/dL    HCT 34.0 34.0 - 46.6 %    MCV 96 79 - 97 fL    MCH 31.3 26.6 - 33.0 pg    MCHC 32.6 31.5 - 35.7 g/dL    RDW 13.1 12.3 - 15.4 %    PLATELET 308 689 - 634 [id number]   METABOLIC PANEL, COMPREHENSIVE   Result Value Ref Range    Glucose 85 65 - 99 mg/dL    BUN 17 8 - 27 mg/dL    Creatinine 0.75 0.57 - 1.00 mg/dL    GFR est non-AA 83 >59 mL/min/1.73    GFR est AA 96 >59 mL/min/1.73    BUN/Creatinine ratio 23 12 - 28    Sodium 139 134 - 144 mmol/L    Potassium 5.0 3.5 - 5.2 mmol/L    Chloride 98 96 - 106 mmol/L    CO2 26 18 - 29 mmol/L    Calcium 9.8 8.7 - 10.3 mg/dL    Protein, total 7.7 6.0 - 8.5 g/dL    Albumin 4.1 3.6 - 4.8 g/dL    GLOBULIN, TOTAL 3.6 1.5 - 4.5 g/dL    A-G Ratio 1.1 (L) 1.2 - 2.2    Bilirubin, total 0.4 0.0 - 1.2 mg/dL    Alk.  phosphatase 77 39 - 117 IU/L    AST (SGOT) 14 0 - 40 IU/L    ALT (SGPT) 6 0 - 32 IU/L   AMB POC LIPID PROFILE   Result Value Ref Range    Cholesterol (POC) 217     Triglycerides (POC) 191     HDL Cholesterol (POC) 73     LDL Cholesterol (POC) 144 MG/DL    Non-HDL Goal (POC) 144     TChol/HDL Ratio (POC) 3.0    AMB POC GLUCOSE BLOOD, BY GLUCOSE MONITORING DEVICE   Result Value Ref Range    Glucose POC 94 mg/dL Assessment/Plan:    ICD-10-CM ICD-9-CM    1. Metastatic cancer to bone (HCC) C79.51 198.5 REFERRAL TO ONCOLOGY   2. Lumbar radiculopathy, acute M54.16 724.4    3. Essential hypertension, benign I10 401.1    4. Breast cancer, stage 2, unspecified laterality ([facility] C50.919 174.9      Orders Placed This Encounter    REFERRAL TO ONCOLOGY     Referral Priority:   Routine     Referral Type:   Consultation     Referral Reason:   Specialty Services Required     Referral Location:   [facility]     Referred to Provider:   Yelena Daniel MD     Requested Specialty:   Oncology     Number of Visits Requested:   1    oxyCODONE-acetaminophen (PERCOCET) 5-325 mg per tablet     Sig: Take 1 Tab by mouth every four (4) hours as needed for Pain. Max Daily Amount: 6 Tabs. Dispense:  30 Tab     Refill:  0     continue present plan,Take 81mg aspirin daily  Patient Instructions   [employer] Activation    Thank you for requesting access to PeopleAdmin. Please follow the instructions below to securely access and download your online medical record. PeopleAdmin allows you to send messages to your doctor, view your test results, renew your prescriptions, schedule appointments, and more. How Do I Sign Up? 1. In your internet browser, go to [email or web address]  2. Click on the First Time User? Click Here link in the Sign In box. You will be redirect to the New Member Sign Up page. 3. Enter your PeopleAdmin Access Code exactly as it appears below. You will not need to use this code after youve completed the sign-up process. If you do not sign up before the expiration date, you must request a new code. PeopleAdmin Access Code: Activation code not generated  Current PeopleAdmin Status: Active (This is the date your PeopleAdmin access code will )    4. Enter the last four digits of your Social Security Number (xxxx) and Date of Birth (mm/dd/yyyy) as indicated and click Submit.  You will be taken to the next sign-up page.  5. Create a Instabugt ID. This will be your Privaris login ID and cannot be changed, so think of one that is secure and easy to remember. 6. Create a Privaris password. You can change your password at any time. 7. Enter your Password Reset Question and Answer. This can be used at a later time if you forget your password. 8. Enter your e-mail address. You will receive e-mail notification when new information is available in [address] 9. Click Sign Up. You can now view and download portions of your medical record. 10. Click the Download Summary menu link to download a portable copy of your medical information. Additional Information    If you have questions, please visit the Frequently Asked Questions section of the Privaris website at [email or web address] Remember, Privaris is NOT to be used for urgent needs. For medical emergencies, dial 911. Follow-up Disposition:  Return in about 4 weeks (around 8/29/2017), or if symptoms worsen or fail to improve. I have reviewed with the patient details of the assessment and plan and all questions were answered. Relevent patient education was performed. The most recent lab findings were reviewed with the patient. An After Visit Summary was printed and given to the patient.

## 2017-08-07 PROBLEM — C79.9 METASTATIC DISEASE (HCC): Status: ACTIVE | Noted: 2017-01-01

## 2017-08-07 NOTE — H&P
[address]   [address] [place] [address]   HISTORY AND PHYSICAL       Name:  Nan Campoverde   MR#:  [id number]   :  1950   Account #:  [de-identified]        Date of Adm:  2017       PRIMARY CARE PHYSICIAN: Codey Rojas MD.    CHIEF COMPLAINT: Back pain and right-sided pain since last 3   months. HISTORY OF PRESENT ILLNESS: This is a 24-year-old [facility]   American female with past medical history of hypertension, sickle cell   trait, history of breast cancer diagnosed in . Subsequently   receiving chemotherapy and then bilateral mastectomy in . She   comes over here because in mid May while doing household chores,   she started having low back pain. Initially it was off and on, but later it   became continuous. She spoke to her primary care physician about it   and had to undergo physical therapy before she could get an MRI. Finally, MRI was done on 2017, which revealed probable   metastatic disease for which her primary care physician advised to see   [facility]. She was in that process, but since she was   having pain, severe low back pain along with generalized weakness   and pain, she decided to come to the ER today. She also claims that she has some visual changes and extreme   diaphoresis. As far as the pain is concerned, she claims that it \"right in my butt\" and   that it is about 8/10 in intensity, sharp, continuous, radiating \"the whole   body,\" and there is no diurnal variation, but movement makes it worse   to the point that she claimed that now she has to be on bed throughout   the whole day. No chest pain, shortness of breath, headache,   dizziness, cough, fever, nausea, vomiting, or changes in bowel   movements. REVIEW OF SYSTEMS   Pertinent positives as above and the rest of the systems were   reviewed and were all negative except for above. PAST MEDICAL HISTORY:   1. Breast cancer diagnosed in . 2. GERD. 3. Hypertension. 4. Sickle cell trait. 5. Diverticulosis. PAST SURGICAL HISTORY:    1. Bilateral mastectomy with reconstruction done in 2013. 2. Colonoscopy done in 2017. 3. Hysterectomy. FAMILY HISTORY: Significant for breast and colon cancer. SOCIAL HISTORY: Nonsmoker, nonalcoholic, nondrug abuser. ALLERGIES: THE PATIENT IS ALLERGIC TO CODEINE. PHYSICAL EXAMINATION   VITAL SIGNS: At the time with the patient seen, the patient's vitals   were as follows: Blood pressure 123/88, pulse 84, respiratory rate 20,   saturating 97% on room air. GENERAL: Not in acute distress, comfortably sitting on bed. HEENT: Head normocephalic, atraumatic. EYES: PERRLA, EOMI. EARS: Bilateral hearing normal, no growths. Nose, no bleeding. MOUTH: Dry mucous membranes. Decent oral hygiene. NECK: Supple, no JVD, no thyromegaly. RESPIRATORY: Clear to auscultation bilaterally. No adventitious   breath sounds. CARDIOVASCULAR: S1, S2 normal. No murmurs, rubs, or gallops. GASTROINTESTINAL: Bowel sounds present. Soft, nontender,   nondistended. NEUROLOGICAL: Cranial nerves 2 through 12 intact. Motor 5/5   bilaterally, upper limbs and lower limbs. Sensory normal.   PSYCHIATRIC: Mood and affect appropriate. Alert and oriented x3. LABORATORY AND RADIOLOGICAL RESULTS: WBC 9.7,   hemoglobin 8.4, hematocrit 27, and a platelet count of 313. Sodium   138, potassium 3.2, chloride 100, bicarbonate 29, glucose 95, BUN 11,   creatinine 0.77, calcium 9.0. ALT of 46, AST of 58. CT scan of the   head was done, which shows head CT.    ASSESSMENT AND PLAN: This is a 78-year-old [facility] American   female with a past medical history of hypertension, gastroesophageal   reflux disease, sickle cell trait, diverticulosis, history of breast cancer,   status post chemotherapy and bilateral mastectomy in 2013.  She   comes over here with a 3-month history of progressively worsening   back pain and is found to have an abnormal MRI of lumbar spine. 1. Intractable back pain. I will put the patient on IV as well as oral pain   medication. Based on how controlled her pain is, we can switch IV pain   medication to oral pain medication, most likely tomorrow. 2. Probable metastatic disease with a history of breast cancer now. I   have spoken to the ER physician and he has agreed on imaging and   biopsy for the patient by the IR. We will keep the patient n.p.o. until   then. 3. History of breast cancer. The patient claims that she was diagnosed   in 2012 and then she underwent chemotherapy and bilateral   mastectomy, as well as reconstruction in 2013. She was followed by   Dr. Elmer Still, we would consult him as well. 4. Hypertension. We will continue the patient's home medication. 5. Dizziness and visual changes. The patient had a CT scan of the head   done that was normal. I would also get an MRI of the head as well. 6. Hypokalemia. We would replace the patient's potassium. I spent about 50 minutes in taking care of the patient.         Venkat Ceballos MD PG / Joseph Boogie   D:  08/07/2017   12:55   T:  08/07/2017   13:35   Job #:  [id number]

## 2017-08-07 NOTE — ED TRIAGE NOTES
Triage note: pt with right sided back and leg pain for past 80 day. Pt having difficulty walking.  Pt saw PCP and had MRI done and referred to Dr. Elier Weathers with [facility]

## 2017-08-07 NOTE — PROGRESS NOTES
TRANSFER - OUT REPORT:    Verbal report given to Brittnee RN(name) on Day Thomason  being transferred to Angio(unit) for ordered procedure       Report consisted of patients Situation, Background, Assessment and   Recommendations(SBAR). Information from the following report(s) SBAR, Kardex, ED Summary and Recent Results was reviewed with the receiving nurse. Lines:   Peripheral IV 08/07/17 Right Antecubital (Active)        Opportunity for questions and clarification was provided.       Patient transported with:   Subtext

## 2017-08-07 NOTE — ED NOTES
PT to be NPO until procedure is scheduled as per MD Jan Yoon. PT regular diet if no procedure today.

## 2017-08-07 NOTE — PROGRESS NOTES
TRANSFER - IN REPORT:    Verbal report received from Amanda Gonzales RN(name) on [address]  being received from ED(unit) for routine progression of care      Report consisted of patients Situation, Background, Assessment and   Recommendations(SBAR). Information from the following report(s) SBAR, Kardex, ED Summary and Recent Results was reviewed with the receiving nurse. Opportunity for questions and clarification was provided. Assessment completed upon patients arrival to unit and care assumed.

## 2017-08-07 NOTE — PROGRESS NOTES
1430: received verbal telephone report from Tavon SMITH 52: pt to angio holding for bone biopsy. Pt complaining of nausea. Paged attending MD about adding a different nausea med- received orders for compazine  1600: procedure start  1610: procedure complete. Pt tolerated well.  1625: TRANSFER - OUT REPORT:    Verbal report given to [address] [place] [address] on [facility]  being transferred to  [address] for routine progression of care       Report consisted of patients Situation, Background, Assessment and   Recommendations(SBAR). Information from the following report(s) SBAR, Procedure Summary and MAR was reviewed with the receiving nurse. Lines:   Peripheral IV 08/07/17 Right Antecubital (Active)        Opportunity for questions and clarification was provided.       Patient transported with:   [id number]

## 2017-08-07 NOTE — ROUTINE PROCESS
TRANSFER - OUT REPORT:    Verbal report given to [facility] (Citizen of [place] Rn(name) on Ramona Bahena  being transferred to [address] [place] [address]  ([facility]) for routine progression of care       Report consisted of patients Situation, Background, Assessment and   Recommendations(SBAR). Information from the following report(s) SBAR, Intake/Output and MAR was reviewed with the receiving nurse. Lines:   Peripheral IV 08/07/17 Right Antecubital (Active)        Opportunity for questions and clarification was provided.       Patient transported with:   Transport

## 2017-08-07 NOTE — CONSULTS
-Hematology / Oncology ([facility]) -    -CC- suspected metastatic disease, hx breast cancer    78 yo woman with history of L breast cancer 2013 who is admitted after presenting with progressive back pain x 3 mo, currently severe, refractory to ibuprofen, associated with poor apetite x  3 wks with 7 lb wt loss, and with drenching night sweats x 1 mo several times per week. Recent MRI of the L spine suggested diffuse metastasis to spine as well as liver metastasis. Regarding hx of breast cancer: s/p neoadjuvant chemo (dose-dense AC-T 10/25/12 - 1/30/13, Dr Conrado Willett) and B mastectomy 2013 (triple negative, 1.8 cm grade 2 residual dz; previous SLN bxs 10/2012 neg and initial tumor size estimated to have been at least 2 cm [T2N0= stage II ]). Not adherent to followup since 2013. Has been referred by her PCP to Dr Cristhian Oliver for oncology assessment, has not yet seen Dr Cristhian Oliver but does wish to stick with that arrangement. Complete ROS notable for some recent visual changes (\"orbs\" in lateral L visual field), chronic nausea and reflux. Otherwise neg x as above.     PMHX: breast cancer, gerd, htn, sickle trait, diverticulosis  PSURGHX: B mastectomy with reconstruction 2013, hysterectomy  SOCHX: caretaker for  (multiple issues including dementia), lives with a sister, moved from [place], previously >3 etoh/d but minimal since move, no tob, former   Yvonne Manriquez: mother had colon cancer    -O-    Patient Vitals for the past 24 hrs:   Temp Pulse Resp BP SpO2   08/07/17 1615 - 86 11 151/69 100 %   08/07/17 1610 - 86 12 156/63 100 %   08/07/17 1605 - 90 11 163/74 100 %   08/07/17 1600 - 90 12 160/60 100 %   08/07/17 1555 - 86 11 153/58 100 %   08/07/17 1550 - 88 14 170/72 100 %   08/07/17 1545 - 88 16 158/87 100 %   08/07/17 1440 97.7 °F (36.5 °C) 80 18 139/65 94 %   08/07/17 1308 97.8 °F (36.6 °C) 84 18 (!) 163/99 95 %   08/07/17 1245 97.8 °F (36.6 °C) 84 - 149/88 97 %   08/07/17 1130 - 99 - 133/72 97 % 08/07/17 1104 - 99 20 131/67 99 %   08/07/17 1005 - 91 20 136/71 99 %   08/07/17 0947 98 °F (36.7 °C) (!) 115 20 157/85 99 %        Gen: resting in bed, sister present  H+N: oral cavity moist  Lymph: no palpable JAZMIN neck, ax  Chest: abnormal breath sounds RULF  CV: RRR  Abd: s/nt  Extr: no edema  Skin: no excessive bruising  Neuro: moving all extremities  Breast: s/p b mastectomy and reconstruction    -Labs-    Recent Labs      08/07/17   1035   WBC  9.7   HGB  8.4*   PLT  304   ANEU  7.1   NA  138   K  3.2*   GLU  95   BUN  11   CREA  0.77   ALT  46   SGOT  58*   TBILI  0.8   AP  325*   CA  9.0       -Imaging-   7/29/17- MRI L spine - Suspect widespread metastatic disease involving the spine and liver and pelvis. L3 vertebra appears largely replaced by tumor. Images personally reviewed. 8/7/17- Head CT w/o contrast- normal    -Assessment + Plan-     *) bone lesions- bone scan, f/u bx pelvis today 8/7/17  *) Elevated globulin fx, suspect reactive, r/o MM  *) liver lesions, with abnormal LFTs- get CT to better assess  *) abnormal breath sounds RULF- check ct chest  *) visual changes- MRI brain pending  *) anemia- new since 3 mo ago; normocytic. Suspect etiology chronic dz. Lab workup.   *) pain- percocet was started, follow  *) hx breast cancer- check tumor markers

## 2017-08-07 NOTE — IP AVS SNAPSHOT
[address] [place] [address] [address] [facility] 
[phone] Patient: Duy Bishop MRN: [id number] ZWM:[birth year] You are allergic to the following Allergen Reactions Codeine Nausea Only Recent Documentation Height Weight BMI OB Status Smoking Status 1.702 m 61.8 kg 21.34 kg/m2 Hysterectomy Never Smoker Unresulted Labs Order Current Status FREE LIGHT CHAINS, KAPPA/LAMBDA, QT In process PROTEIN ELECTROPHORESIS In process IMMUNOELECTROPHORESIS [facility].) Preliminary result Emergency Contacts  (Rel.) Home Phone Work Phone Mobile Phone Libby Phillips (Sister) [phone] -- -- About your hospitalization You were admitted on:  August 7, 2017 You last received care in the:  [facility] You were discharged on:  August 9, 2017 Why you were hospitalized Your primary diagnosis was:  Metastatic Disease (Hcc) Providers Seen During Your Hospitalizations Provider Role Specialty Primary office phone Miri Mas MD Attending Provider Emergency Medicine [phone] Fer Fletcher MD Attending Provider Internal Medicine [phone] Your Primary Care Physician (PCP) Primary Care Physician Office Phone Office Fax [address] [facility], [address] [place] [address] [phone] Follow-up Information Follow up With Details Comments Contact Info Randall Collazo MD In 1 week  [address] [facility] [address] 
[phone] Adriana Han MD In 1 week  [address] [facility] [address] [address] 
[phone] Current Discharge Medication List  
  
START taking these medications Dose & Instructions Dispensing Information Comments Morning Noon Evening Bedtime  
 ondansetron 4 mg disintegrating tablet Commonly known as:  ZOFRAN ODT Your last dose was: Your next dose is: Dose:  4 mg Take 1 Tab by mouth every six (6) hours as needed. Quantity:  30 Tab Refills:  6  
     
   
   
   
  
 oxyCODONE ER 10 mg ER tablet Commonly known as:  OxyCONTIN Your last dose was: Your next dose is:    
   
   
 Dose:  10 mg Take 1 Tab by mouth every twelve (12) hours. Max Daily Amount: 20 mg.  
 Quantity:  60 Tab Refills:  0  
     
   
   
   
  
 oxyCODONE-acetaminophen 5-325 mg per tablet Commonly known as:  PERCOCET Your last dose was: Your next dose is:    
   
   
 Dose:  1 Tab Take 1 Tab by mouth every four (4) hours as needed. Max Daily Amount: 6 Tabs. Quantity:  180 Tab Refills:  0 CONTINUE these medications which have CHANGED Dose & Instructions Dispensing Information Comments Morning Noon Evening Bedtime  
 aspirin delayed-release 81 mg tablet What changed:  Another medication with the same name was removed. Continue taking this medication, and follow the directions you see here. Your last dose was: Your next dose is:    
   
   
 Dose:  81 mg Take 81 mg by mouth daily. Refills:  0 CONTINUE these medications which have NOT CHANGED Dose & Instructions Dispensing Information Comments Morning Noon Evening Bedtime  
 hydroCHLOROthiazide 25 mg tablet Commonly known as:  HYDRODIURIL Your last dose was: Your next dose is: TAKE 1 TABLET BY MOUTH DAILY AFTER BREAKFAST Quantity:  30 Tab Refills:  0  
     
   
   
   
  
 multivitamin tablet Commonly known as:  ONE A DAY Your last dose was: Your next dose is:    
   
   
 Dose:  1 Tab Take 1 Tab by mouth daily (after breakfast). Takes when remembered. Refills:  0 STOP taking these medications   
 ibuprofen 800 mg tablet Commonly known as:  MOTRIN Where to Get Your Medications Information on where to get these meds will be given to you by the nurse or doctor. ! Ask your nurse or doctor about these medications  
  ondansetron 4 mg disintegrating tablet  
 oxyCODONE ER 10 mg ER tablet  
 oxyCODONE-acetaminophen 5-325 mg per tablet Discharge Instructions Discharge Instructions PATIENT ID: Kolby Carballo MRN: [id number] YOB: 1950 DATE OF ADMISSION: 8/7/2017  9:50 AM   
DATE OF DISCHARGE: 8/9/2017 PRIMARY CARE PROVIDER: Faina Enamorado MD  
 
ATTENDING PHYSICIAN: Clarence Cruz MD 
DISCHARGING PROVIDER: Clarence Cruz MD   
To contact this individual call [phone] and ask the  to page. If unavailable ask to be transferred the Adult Hospitalist Department. DISCHARGE DIAGNOSES Back pain CONSULTATIONS: IP CONSULT TO HOSPITALIST 
IP CONSULT TO ONCOLOGY PROCEDURES/SURGERIES: * No surgery found * PENDING TEST RESULTS:  
At the time of discharge the following test results are still pending: bone biopsy FOLLOW UP APPOINTMENTS:  
Follow-up Information Follow up With Details Comments Contact Info Faina Enamorado MD In 1 week  [address] [place] [address] 
[phone] ADDITIONAL CARE RECOMMENDATIONS:  
Follow up with Dr Aramis Velarde in 1 week DIET: Cardiac Diet ACTIVITY: Activity as tolerated DISCHARGE MEDICATIONS: 
 See Medication Reconciliation Form · It is important that you take the medication exactly as they are prescribed. · Keep your medication in the bottles provided by the pharmacist and keep a list of the medication names, dosages, and times to be taken in your wallet. · Do not take other medications without consulting your doctor. NOTIFY YOUR PHYSICIAN FOR ANY OF THE FOLLOWING:  
Fever over 101 degrees for 24 hours.   
Chest pain, shortness of breath, fever, chills, nausea, vomiting, diarrhea, change in mentation, falling, weakness, bleeding. Severe pain or pain not relieved by medications. Or, any other signs or symptoms that you may have questions about. DISPOSITION: 
 x Home With: 
 OT  PT  [facility]  RN  
  
 SNF/Inpatient Rehab/LTAC Independent/assisted living Hospice Other: CDMP Checked:  
Yes x PROBLEM LIST Updated: 
Yes x Signed:  
Daniel Nguyen MD 
8/9/2017 11:50 AM 
 
Discharge Orders None ACO Transitions of Care Introducing [employer] [address] offers a voluntary care coordination program to provide high quality service and care to Milly Valera fee-for-service beneficiaries. Richar Corbett was designed to help you enhance your health and well-being through the following services: ? Transitions of Care  support for individuals who are transitioning from one care setting to another (example: Hospital to home). ? Chronic and Complex Care Coordination  support for individuals and caregivers of those with serious or chronic illnesses or with more than one chronic (ongoing) condition and those who take a number of different medications. If you meet specific medical criteria, a [facility] [address] may call you directly to coordinate your care with your primary care physician and your other care providers. For questions about the [facility] programs, please, contact your physicians office. For general questions or additional information about Accountable Care Organizations: 
Please visit www.medicare.gov/acos. html or call 1-800-MEDICARE (7-298.909.6987) TTY users should call 2-713.999.6448. [place] Announcement We are excited to announce that we are making your provider's discharge notes available to you in [place].   You will see these notes when they are completed and signed by the physician that discharged you from your recent hospital stay. If you have any questions or concerns about any information you see in [place], please call the Health Information Department where you were seen or reach out to your Primary Care Provider for more information about your plan of care. Introducing [facility]! Dear Hanh Izquierdo: Thank you for requesting a Kinnek account. Our records indicate that you already have an active Kinnek account. You can access your account anytime at [email or web address]. [employer] Did you know that you can access your hospital and ER discharge instructions at any time in [place]? You can also review all of your test results from your hospital stay or ER visit. Additional Information If you have questions, please visit the Frequently Asked Questions section of the [employer]nek website at [email or web address] Remember, [employer] is NOT to be used for urgent needs. For medical emergencies, dial 911. Now available from your iPhone and Android! General Information Please provide this summary of care documentation to your next provider. Patient Signature:  ____________________________________________________________ Date:  ____________________________________________________________  
  
Margarito Estevez Provider Signature:  ____________________________________________________________ Date:  ____________________________________________________________

## 2017-08-07 NOTE — ED PROVIDER NOTES
HPI Comments: 77 y.o. female with past medical history significant for HTN, sickle cell trait, GERD, breast cancer, and anxiety & depression who presents from home with chief complaint of back pain. Patient was diagnosed with sciatica in May (3 months ago) and has been taking Ibuprofen without relief. Patient was also prescribed Hydrocodone but states that it gives her nausea and vomiting. Patient coplains that her pain has been progressively worsening. She states that the pain is located in her R hip, right pelvis, and lower back. She states that it radiates down the back of her right thigh and right leg. Patient reports that the pain worsens upon exertion and is an 8/10 while walking. She denies having any pain while lying down. She had an MRI on 7/29/17 (8 days ago) of her lower back. After reviewing the results, patient's PCP advised her to have a biopsy done at [facility]. Patient also complains of some constipation and suprapubic abdominal pain. She also reports having some visual changes recently along with episodes of extreme diaphoresis. She denies having any chest pain, melena, diarrhea, blood in stool, headache, or current nausea and vomiting. There are no other acute medical concerns at this time. Social hx: nonsmoker, no EtOH use, former drug use (marijuana)   PCP: Luis Alfredo Singh MD  Oncologist: Rylan Roche MD    Note written by Kirit Newberry, as dictated by Cuong Valadez MD 10:17 AM      The history is provided by the patient. No  was used.         Past Medical History:   Diagnosis Date    Anxiety and depression     Breast CA ([facility] [address] 2012    Left only but had a bilateral mastectomy/chemo    GERD (gastroesophageal reflux disease)     not an issure, no medications    Hypertension     Ill-defined condition     pinched nerve in back    Other ill-defined conditions     last chemo treatment 1/30/13    Sickle cell trait (HCC)        Past Surgical History:   Procedure Laterality Date    ABDOMEN SURGERY PROC UNLISTED      hernia as child    BREAST SURGERY PROCEDURE UNLISTED  10/11/12    LEFT SLNB and port-a-cath insertion    BREAST SURGERY PROCEDURE UNLISTED      bilateral mastectomy with reconstruction- states not arm restricted    COLONOSCOPY N/A 5/10/2017    COLONOSCOPY performed by Tiffany Spence MD at [address] HX BREAST RECONSTRUCTION  7/1/2013    BREAST TISSUE EXPANDER REMOVAL performed by Nuris Ruffin MD at [address] [place] [address] [address] HX LYMPHADENECTOMY  October 2012    sentinel node biopsy, negative    HX ORTHOPAEDIC Bilateral     ground down the bone d/t pressure on toes from shoes    HX VASCULAR ACCESS      insertion and removal    HX WISDOM TEETH EXTRACTION           Family History:   Problem Relation Age of Onset    Cancer Mother      colon cancer    Heart Disease Father     Diabetes Father     Cancer Sister 27     breast cancer    MS Sister     Cancer Other 36     breast cancer    Cancer Maternal Grandfather      MOUTH/THROAT    Cancer Paternal Grandmother      BRAIN    Sickle Cell Anemia Brother     Malignant Hyperthermia Neg Hx     Pseudocholinesterase Deficiency Neg Hx     Delayed Awakening Neg Hx     Post-op Nausea/Vomiting Neg Hx     Emergence Delirium Neg Hx     Post-op Cognitive Dysfunction Neg Hx     Other Neg Hx        Social History     Social History    Marital status:      Spouse name: N/A    Number of children: N/A    Years of education: N/A     Occupational History    Not on file. Social History Main Topics    Smoking status: Never Smoker    Smokeless tobacco: Never Used    Alcohol use No    Drug use: Yes     Special: Marijuana      Comment: 7 MONTHS AGO    Sexual activity: Not on file     Other Topics Concern    Not on file     Social History Narrative         ALLERGIES: Codeine    Review of Systems   Constitutional: Positive for diaphoresis. Negative for appetite change, chills and fever. HENT: Negative for congestion. Eyes: Positive for visual disturbance. Respiratory: Negative for cough, chest tightness, shortness of breath and wheezing. Cardiovascular: Negative for chest pain. Gastrointestinal: Positive for abdominal pain and constipation. Negative for blood in stool, diarrhea and vomiting. Genitourinary: Positive for pelvic pain. Negative for dysuria and frequency. Musculoskeletal: Positive for back pain and myalgias. Negative for joint swelling. Skin: Negative for rash. Neurological: Negative for speech difficulty and headaches. All other systems reviewed and are negative. Vitals:    08/07/17 0947   BP: 157/85   Pulse: (!) 115   Resp: 20   Temp: 98 °F (36.7 °C)   SpO2: 99%   Weight: 61.8 kg (136 lb 4 oz)   Height: 5' 7\" (1.702 m)            Physical Exam   Constitutional: She is oriented to person, place, and time. She appears well-developed and well-nourished. No distress. HENT:   Head: Normocephalic and atraumatic. Nose: Nose normal.   Eyes: Conjunctivae are normal. Pupils are equal, round, and reactive to light. No scleral icterus. Neck: Normal range of motion. Neck supple. No JVD present. No tracheal deviation present. No thyromegaly present. Cardiovascular: Normal rate, regular rhythm and normal heart sounds. No murmur heard. Pulmonary/Chest: Effort normal and breath sounds normal. No respiratory distress. She has no wheezes. She has no rales. Abdominal: Soft. Bowel sounds are normal. She exhibits no mass. There is no tenderness. There is no rebound and no guarding. Musculoskeletal: Normal range of motion. She exhibits no edema. Lumbar back: She exhibits tenderness. Neurological: She is alert and oriented to person, place, and time. No cranial nerve deficit. Coordination normal.   Skin: Skin is warm and dry. No rash noted. She is not diaphoretic. No erythema.    Psychiatric: She has a normal mood and affect. Her behavior is normal.   Nursing note and vitals reviewed. Note written by Kirit Coon, as dictated by Martin Kay MD 10:17 AM    MDM  Number of Diagnoses or Management Options    ED Course       Procedures             This pt is being admitted for pain control of her newly discover metastatic dz-likely breast although multiple myeloma and other malignancies could be the cause.  She will get a ct guided bone biopsy

## 2017-08-07 NOTE — PROGRESS NOTES
Admission Medication Reconciliation:    Information obtained from: Patient, rx query    Significant PMH/Disease States:   Past Medical History:   Diagnosis Date    Anxiety and depression     Breast CA ([address] 2012    Left only but had a bilateral mastectomy/chemo    GERD (gastroesophageal reflux disease)     not an issure, no medications    Hypertension     Ill-defined condition     pinched nerve in back    Other ill-defined conditions     last chemo treatment 1/30/13    Sickle cell trait [facility])        Chief Complaint for this Admission:  Right back and leg pain    Allergies:  Codeine    Prior to Admission Medications:   Prior to Admission Medications   Prescriptions Last Dose Informant Patient Reported? Taking?   aspirin delayed-release 81 mg tablet 4/30/2017 at Unknown time  Yes No   Sig: Take 81 mg by mouth daily. hydroCHLOROthiazide (HYDRODIURIL) 25 mg tablet 4/30/2017 at Unknown time  No No   Sig: TAKE 1 TABLET BY MOUTH DAILY AFTER BREAKFAST   ibuprofen (MOTRIN) 800 mg tablet 8/7/2017 at Unknown time  No Yes   Sig: TAKE 1 TABLET BY MOUTH TWICE DAILY AFTER MEALS   multivitamin (ONE A DAY) tablet 4/30/2017 at Unknown time  Yes No   Sig: Take 1 Tab by mouth daily (after breakfast). Takes when remembered. Facility-Administered Medications: None       Comments/Recommendations:   Removed: Percocet, prednisone   **Patient has not taken aspirin, HCTZ, or multivitamin since April, but states that she is supposed to be taking them. Thank you for allowing me to participate in the care of this patient. The above list now represents the patient's most up-to-date PTA medication list.  Please call the inpatient pharmacy at [phone] with any questions. Neida Hernandez D.  Candidate 2018

## 2017-08-08 NOTE — PROGRESS NOTES
Spiritual Care Partner Volunteer visited patient in [address] on 8/8/17. Documented by:  Boyd Gamez M.Div.    Paging Service [phone])

## 2017-08-08 NOTE — PROGRESS NOTES
Problem: Discharge Planning  Goal: *Discharge to safe environment  Outcome: Progressing Towards Goal  Disposition Needs:  Patient plan to discharge home with family assistance. Discharge TBD pending recommendations. There are no CM consults or needs at this time. CM will continue to follow and assist with disposition needs as they arise. Mode of transport at time of discharge to be provided by patient's family.      ARMANDO Rausch/HARDY  3:03 PM

## 2017-08-08 NOTE — PROGRESS NOTES
Bedside shift change report given to Roni Somers (oncoming nurse) by López Bullard  (offgoing nurse). Report included the following information SBAR, Kardex and Recent Results.

## 2017-08-08 NOTE — PROGRESS NOTES
Chart Reviewed:  CM met with patient at bedside. Patient was alert and oriented. CM introduced self and explained transitions of care role. CM verified PCP, insurance coverage, and demographics. Patient is a 77year old female with an admitting diagnosis of malignant neoplasm metastic to other site. Patient's medical history consists of the following: Breast cancer diagnosed in 2012, GERD, Hypertension, Sickle cell trait, and Diverticulosis. Patient resides in a 2 story home with her sister. There are 5 steps to enter into the home, 9 steps to reach second level, and 5 steps down to basement. Patient is independent with ADL's and IADL's and utilizes no DME. Patient receives [employer] and [employer] as source of income. Patient reports no financial stressors and or concerns at this time. Patient utilizes [facility] for prescriptions. Mode of transport at time of discharge to be provided by patient's family. Prior to hospitalization, patient was receiving outpatient PT at [facility] 2 days a week attempting to increase to 6 days a week, however was unable to complete due to increased pain. There are no CM consults or needs at this time. CM will continue to follow and assist with disposition needs as they arise. Care Management Interventions  PCP Verified by CM: Yes  Last Visit to PCP: 08/01/17  Palliative Care Consult (Criteria: CHF and RRAT>21): No  Reason for No Palliative Care Consult:  Other (see comment) (Does not meet criteria)  Mode of Transport at Discharge: BLS (Family to assist with transport)  Transition of Care Consult (CM Consult):  (There are no CM consults or needs at this time)  Discharge Durable Medical Equipment: No  Physical Therapy Consult: Yes  Occupational Therapy Consult: Yes  Speech Therapy Consult: No  Current Support Network: Relative's Home  Confirm Follow Up Transport: Family  Plan discussed with Pt/Family/Caregiver: Yes  Freedom of Choice Offered: Yes  Discharge Location  Discharge Placement: Home with family assistance      ARMANDO Mccollum/HARDY  3:00 PM

## 2017-08-08 NOTE — PROGRESS NOTES
Problem: Patient Education: Go to Patient Education Activity  Goal: Patient/Family Education  Outcome: Progressing Towards Goal  Pt understands she needs to get up slowly and sit on edge of bed before getting up and walking.

## 2017-08-08 NOTE — PROGRESS NOTES
NUTRITION COMPLETE ASSESSMENT    RECOMMENDATIONS:   1.  RD to add Ensure Compact with all meals (vanilla)   2. Check weight daily   3. Consider need for appetite stimulant if con't to c/o of early satiety and poor intake      Interventions/Plan:   Food/Nutrient Delivery:    Commercial supplement      Ensure Compact 3x daily (220cals & 9g Pro each)    Nutrition Education:     Coordination of Care:    Nutrition Counseling:        Assessment:   Reason for Assessment:   [] Provider Consult  [x]BPA/MST Referral   []LOS   []Reassessment   []NPO/Clear Liquid   []At Nutrition Risk  []Other    Diet: Regular  Supplements: none PTA  Nutritionally Significant Medications: [x] Reviewed    Meal Intake: No data found. NPO for MRI this morning     Subjective:  Pt returned from MRI with mild nausea    Objective:  Pt recognizes she hasn't been able to eat as well as she should be in recent weeks, she is a caretaker for her  and has been increasingly in pain and fatigued, her appetite has declined but she tries to still \"grab bites here and there\". Pt with hx of triple negative breast CA in 2013, now with diffuse mets to bone and liver. Oncology plan is not determined/known to RD at this time. Pt will benefit from nutrition supplements to halt wt loss and support treatment plan (as it becomes determined). Estimated Nutrition Needs:   Kcals/day: 1550 Kcals/day ([address] AF 1.3 )  Protein: 74 g (1.2 g/kg of current wt)  Fluid: 1550 ml (25mL/kg of current wt)     Based On: [place] St Jeor  Weight Used: Actual wt (61.8 kg)    Pt expected to meet estimated nutrient needs:    []   Yes     []  No       [x] Unable to predict at this time      Nutrition Diagnosis:   1. Inadequate energy intake related to poor appetite as evidenced by pt reports declining intake for past several weeks, increased pain, early satiety, difficulty sleeping, change in taste preferences & missing meals     2.  Moderate Acute Malnutrition related to recurring CA with mets as evidence by decline in PO, change in taste & wt loss of 4% in last 1 month & loss of 6.8% within the last 4 months. Meets Criteria for Moderate Acute Malnutrition as evidenced by:  [x] Mild muscle wasting, loss of subcutaneous fat  [x] Nutritional intake <75% of recommended intake for >1 week  [x] Weight loss of 1-2% in 1 week, 5% in 1 month, 7.5% in 3 months, or 10% in 6 months  [] Mild edema        Goals:     tolerate 50% of ONS offered within 3 days     Monitoring & Evaluation:    - Total energy intake   - Nutritional anemia profile   -      Previous Nutrition Goals Met:  N/A  Previous Recommendations:      N/A    Education & Discharge Needs:   [] None Identified   [x] Identified and addressed: encourage increased calories & use of ONS     [] Participated in care plan, discharge planning, and/or interdisciplinary rounds        Cultural, Ep[employer]opalian and ethnic food preferences identified:   None    Skin Integrity: [x]Intact  []Other  Edema: [x]None []Other  Last BM: 8/6  ABD:  Hypoactive, nausea  Food Allergies: [x]None []Other    Anthropometrics:    Weight Loss Metrics 8/8/2017 8/7/2017 8/1/2017 7/19/2017 6/21/2017 5/19/2017 5/10/2017   Today's Wt 136 lb 4 oz - 139 lb 142 lb 144 lb 6.4 oz 143 lb 146 lb   BMI - 21.34 kg/m2 21.13 kg/m2 21.59 kg/m2 21.96 kg/m2 21.74 kg/m2 22.53 kg/m2      Last 3 Recorded Weights in this Encounter    08/07/17 0947 08/08/17 1455   Weight: 61.8 kg (136 lb 4 oz) 61.8 kg (136 lb 4 oz)      Weight Source: Standing scale (comment)  Height: 5' 7\" (170.2 cm),    Body mass index is 21.34 kg/(m^2).   IBW : 61.2 kg (135 lb), % IBW (Calculated): 100.93 %  Usual Body Weight: 65.8 kg (145 lb), Shoes with Elastic Laces: Compensatory technique training    Labs:    Lab Results   Component Value Date/Time    Sodium 137 08/08/2017 03:25 AM    Potassium 4.1 08/08/2017 03:25 AM    Chloride 101 08/08/2017 03:25 AM    CO2 25 08/08/2017 03:25 AM    Glucose 121 08/08/2017 03:25 AM    BUN 8 08/08/2017 03:25 AM    Creatinine 0.62 08/08/2017 03:25 AM    Calcium 8.8 08/08/2017 03:25 AM    Albumin 2.3 08/08/2017 03:25 AM     No results found for: HBA1C, HGBE8, CKX9NDBC, SYQ7AHVM  Lab Results   Component Value Date/Time    Glucose 121 08/08/2017 03:25 AM    Glucose (POC) 94 03/03/2017 04:12 PM    Glucose POC 94 06/21/2017 03:29 PM      Lab Results   Component Value Date/Time    ALT (SGPT) 39 08/08/2017 03:25 AM    AST (SGOT) 49 08/08/2017 03:25 AM    Alk.  phosphatase 310 08/08/2017 03:25 AM    Bilirubin, total 0.6 08/08/2017 03:25 AM        Nate Fritz RD, MS, CDE

## 2017-08-08 NOTE — PROGRESS NOTES
Problem: Self Care Deficits Care Plan (Adult)  Goal: *Acute Goals and Plan of Care (Insert Text)  Occupational Therapy Goals  Initiated 8/8/017    1. Patient will perform lower body dressing with overall Mod I using lower body dressing AE PRN within 7 days. 2. Patient will participate in bed mobility with compensatory techniques to reduce pain and further injury within 7 days. 3. Patient will demonstrate proper body mechanics for assisting  with mobility and ADLs within 7 days. 4. Patient will complete upper body therapeutic exercises/activities to maximize strength and functional mobility within 7 days. OCCUPATIONAL THERAPY EVALUATION  Patient: Jyotsna Toussaint (85 y.o. female)  Date: 8/8/2017  Primary Diagnosis: Metastatic disease ([facility]        Precautions:          ASSESSMENT :  Based on the objective data described below, the patient presents with overall independence for functional mobility, up to [address] [place] [address] for lower body ADLs (independent without assist but experiences significant pain), and independent for upper body ADLs. Patient received supine in bed, reporting she was \"constantly in bed. I go to bed in pain and I wake up in pain\". Patient educated on lower body dressing AE to decrease strain on R hip and increase functional independence with ADLs. Patient verbalizing good understanding and that she had not thought there would be ways to complete ADLs without pain. Transport then present to take patient for further testing. Recommend following up with practice using AE and additional education on proper body mechanics when assisting  with mobility and completing remainder of ADLs/IADLs. Will continue to follow, anticipate no needs at discharge; familiar with [facility]. Patient will benefit from skilled intervention to address the above impairments.   Patients rehabilitation potential is considered to be Good  Factors which may influence rehabilitation potential include:   [ ]             None noted  [ ]             Mental ability/status  [ ]             Medical condition  [ ]             Home/family situation and support systems  [ ]             Safety awareness  [ ]             Pain tolerance/management  [ ]             Other:        PLAN :  Recommendations and Planned Interventions:  [X]               Self Care Training                  [X]        Therapeutic Activities  [X]               Functional Mobility Training    [ ]        Cognitive Retraining  [X]               Therapeutic Exercises           [X]        Endurance Activities  [X]               Balance Training                   [ ]        Neuromuscular Re-Education  [ ]               Visual/Perceptual Training     [X]   Home Safety Training  [X]               Patient Education                 [X]        Family Training/Education  [ ]               Other (comment):     Frequency/Duration: Patient will be followed by occupational therapy 3 times a week to address goals. Discharge Recommendations: None  Further Equipment Recommendations for Discharge: Recommend reacher, long handled shoe horn, elastic shoe laces, sock aide, long handled sponge - patient regularly goes to [email or web address] [facility] and states she will  equipment there       SUBJECTIVE:   Patient stated I don't think there is anything Occupational Therapy can do to fix the pain I'm having when it happens with every single thing I do.       OBJECTIVE DATA SUMMARY:   HISTORY:   Past Medical History:   Diagnosis Date    Anxiety and depression      [facility] CA [facility] 2012     Left only but had a bilateral mastectomy/chemo    GERD (gastroesophageal reflux disease)       not an issure, no medications    Hypertension      Ill-defined condition       pinched nerve in back    Other ill-defined conditions       last chemo treatment 1/30/13    Sickle cell trait ([facility]       Past Surgical History:   Procedure Laterality Date    ABDOMEN SURGERY PROC UNLISTED hernia as child    BREAST SURGERY PROCEDURE UNLISTED   10/11/12     LEFT SLNB and port-a-cath insertion    BREAST SURGERY PROCEDURE UNLISTED         bilateral mastectomy with reconstruction- states not arm restricted    COLONOSCOPY N/A 5/10/2017     COLONOSCOPY performed by Charles Villareal MD at 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 Patient reports he \"wants me to do everything for him but he can do more than he says\". Patient needs help with dressing and supervision with walking despite patient stating \"he can walk on his own, he just wants me to be there with him\". Patient is independent at baseline. Expanded or extensive additional review of patient history:      Home Situation  Home Environment: Private residence  # Steps to Enter: 5  Rails to Enter: Yes  One/Two Story Residence: Two story  # of Interior Steps: 14  Living Alone: No  Support Systems: Family member(s)  Patient Expects to be Discharged to[de-identified] Private residence  Current DME Used/Available at Home: None  [ ]  Right hand dominant   [ ]  Left hand dominant     EXAMINATION OF PERFORMANCE DEFICITS:  Cognitive/Behavioral Status:  Neurologic State: Alert  Orientation Level: Oriented X4  Cognition: Appropriate decision making; Appropriate for age attention/concentration; Appropriate safety awareness; Follows commands  Perception: Appears intact  Perseveration: No perseveration noted  Safety/Judgement: Fall prevention; Awareness of environment     Skin: Appears intact     Edema: None noted in BUEs     Hearing: Auditory  Auditory Impairment: None     Vision/Perceptual:            Acuity: Within Defined Limits          Range of Motion:  AROM: Within functional limits  PROM: Within functional limits        Strength:  Strength: Within functional limits        Coordination:  Coordination: Within functional limits  Fine Motor Skills-Upper: Left Intact; Right Intact    Gross Motor Skills-Upper: Left Intact; Right Intact     Tone & Sensation:  Tone: Normal  Sensation: Impaired        Balance:  Sitting: Intact  Standing: Intact     Functional Mobility and Transfers for ADLs:  Bed Mobility:  Supine to Sit: Modified independent  Sit to Supine: Modified independent     Transfers:  Sit to Stand: Modified independent  Stand to Sit: Modified independent  Toilet Transfer : Modified independent     ADL Assessment:  Feeding: Independent     Oral Facial Hygiene/Grooming: Independent     Bathing: Minimum assistance (Independent with significant pain)     Upper Body Dressing: Independent     Lower Body Dressing: Minimum assistance; Adaptive equipment (Independent with significant pain)     Toileting: Independent                 ADL Intervention and task modifications:     Lower Body Dressing Assistance  Pants With Elastic Waist: Compensatory technique training  Socks: Compensatory technique training  Shoes with Elastic Laces: Compensatory technique training  Leg Crossed Method Used: No     Cognitive Retraining  Safety/Judgement: Fall prevention; Awareness of environment     Functional Measure:  Barthel Index:      Bathin  Bladder: 10  Bowels: 10  Groomin  Dressin  Feeding: 10  Mobility: 10  Stairs: 0  Toilet Use: 10  Transfer (Bed to Chair and Back): 15  Total: 75         Barthel and G-code impairment scale:  Percentage of impairment CH  0% CI  1-19% CJ  20-39% CK  40-59% CL  60-79% CM  80-99% CN  100%   Barthel Score 0-100 100 99-80 79-60 59-40 20-39 1-19    0   Barthel Score 0-20 20 17-19 13-16 9-12 5-8 1-4 0      The Barthel ADL Index: Guidelines  1. The index should be used as a record of what a patient does, not as a record of what a patient could do. 2. The main aim is to establish degree of independence from any help, physical or verbal, however minor and for whatever reason. 3. The need for supervision renders the patient not independent. 4. A patient's performance should be established using the best available evidence. Asking the patient, friends/relatives and nurses are the usual sources, but direct observation and common sense are also important. However direct testing is not needed. 5. Usually the patient's performance over the preceding 24-48 hours is important, but occasionally longer periods will be relevant. 6. Middle categories imply that the patient supplies over 50 per cent of the effort. 7. Use of aids to be independent is allowed. Mariana Santos., Barthel, D.W. ([id number]). Functional evaluation: the Barthel Index. [facility] (14)2. BRIAN Matthews Sa, Fauzia Licona., Keiko Noonan., [place], [address] [place] [address] Ave (1999). Measuring the change indisability after inpatient rehabilitation; comparison of the responsiveness of the Barthel Index and Functional [place] Measure. Journal of Neurology, Neurosurgery, and Psychiatry, 66(4), 097-405. Rogers Garcia, N.J.A, ADRIAN Bowen, & Alysha Torres MBRIANNE. (2004.) Assessment of post-stroke quality of life in cost-effectiveness studies: The usefulness of the Barthel Index and the EuroQoL-5D. Quality of Life Research, 13, 259-67            G codes: In compliance with CMSs Claims Based Outcome Reporting, the following G-code set was chosen for this patient based on their primary functional limitation being treated: The outcome measure chosen to determine the severity of the functional limitation was the Barthel Index with a score of 75/100 which was correlated with the impairment scale.       · Self Care:               - CURRENT STATUS:    CJ - 20%-39% impaired, limited or restricted               - GOAL STATUS:           CI - 1%-19% impaired, limited or restricted               - D/C STATUS:                       ---------------To be determined---------------      Occupational Therapy Evaluation Charge Determination   History Examination Decision-Making   LOW Complexity : Brief history review  LOW Complexity : 1-3 performance deficits relating to physical, cognitive , or psychosocial skils that result in activity limitations and / or participation restrictions  LOW Complexity : No comorbidities that affect functional and no verbal or physical assistance needed to complete eval tasks       Based on the above components, the patient evaluation is determined to be of the following complexity level: LOW   Pain:  Pain Scale 1: Numeric (0 - 10)  Pain Intensity 1: 0  Pain Location 1: Back  Pain Orientation 1: Right  Pain Description 1: Sore  Pain Intervention(s) 1: Medication (see MAR)  Activity Tolerance:   Good. Please refer to the flowsheet for vital signs taken during this treatment. After treatment:   [ ] Patient left in no apparent distress sitting up in chair  [X] Patient left in no apparent distress on transport stretcher  [ ] Call bell left within reach  [X] Nursing notified  [X] Caregiver present  [ ] Bed alarm activated      COMMUNICATION/EDUCATION:   The patients plan of care was discussed with: Registered Nurse.  [X] Home safety education was provided and the patient/caregiver indicated understanding. [X] Patient/family have participated as able in goal setting and plan of care. [ ] Patient/family agree to work toward stated goals and plan of care. [ ] Patient understands intent and goals of therapy, but is neutral about his/her participation. [ ] Patient is unable to participate in goal setting and plan of care. This patients plan of care is appropriate for delegation to \[facility] [place] [facility]\"".      Thank you for this referral.  Chun Lopes OT  Time Calculation: 11 mins

## 2017-08-08 NOTE — PROGRESS NOTES
physical Therapy EVALUATION/DISCHARGE  Patient: Charline Franco (43 y.o. female)  Date: 8/8/2017  Primary Diagnosis: Metastatic disease ([facility]        Precautions:      ASSESSMENT :  Based on the objective data described below, the patient presents with overall baseline mobility for bed, transfers and gait. She does have significant pain with mobility but able to move with modified independence. Gait is slightly antalgic with increased right hip and pelvic pain.  educated patient on keeping up her mobility during this hospitalization and recommend up and walking in the louie as tolerated. She states had increased pain with outpatient PT and doing HEP. At this time recommend gently ROM exercise and mobilizing. No other needs at this time. .    Skilled physical therapy is not indicated at this time. PLAN :  Discharge Recommendations: None  Further Equipment Recommendations for Discharge: none     SUBJECTIVE:   Patient stated PT really caused me more pain than without it. Duglas Chauhan (regarding outpatient PT)    OBJECTIVE DATA SUMMARY:   HISTORY:    Past Medical History:   Diagnosis Date    Anxiety and depression     Breast CA ([facility] 2012    Left only but had a bilateral mastectomy/chemo    GERD (gastroesophageal reflux disease)     not an issure, no medications    Hypertension     Ill-defined condition     pinched nerve in back    Other ill-defined conditions     last chemo treatment 1/30/13    Sickle cell trait ([facility]      Past Surgical History:   Procedure Laterality Date    ABDOMEN SURGERY PROC UNLISTED      hernia as child    BREAST SURGERY PROCEDURE UNLISTED  10/11/12    LEFT SLNB and port-a-cath insertion    BREAST SURGERY PROCEDURE UNLISTED      bilateral mastectomy with reconstruction- states not arm restricted    COLONOSCOPY N/A 5/10/2017    COLONOSCOPY performed by Wendy Eddy MD at [address]  7/1/2013    BREAST TISSUE EXPANDER REMOVAL performed by Abdias Hand MD [place] at [address] [facility] HX LYMPHADENECTOMY  October 2012    sentinel node biopsy, negative    HX ORTHOPAEDIC Bilateral     ground down the bone d/t pressure on toes from shoes    HX VASCULAR ACCESS      insertion and removal    HX WISDOM TEETH EXTRACTION       Prior Level of Function/Home Situation: modified independent; sister staying with her to provide assist as needed. 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  Personal factors and/or comorbidities impacting plan of care:     Home Situation  Home Environment: Private residence  # Steps to Enter: 5  Rails to Enter: Yes  One/Two Story Residence: Two story  # of Interior Steps: 14  Living Alone: No  Support Systems: Family member(s)  Patient Expects to be Discharged to[de-identified] Private residence  Current DME Used/Available at Home: None    EXAMINATION/PRESENTATION/DECISION MAKING:   Critical Behavior:  Neurologic State: Alert, Appropriate for age  Orientation Level: Oriented X4        Hearing: Auditory  Auditory Impairment: None    Range Of Motion:  AROM: Within functional limits           PROM: Within functional limits           Strength:    Strength:  Within functional limits                    Tone & Sensation:   Tone: Normal              Sensation: Impaired               Coordination:  Coordination: Within functional limits  Vision:      Functional Mobility:  Bed Mobility:     Supine to Sit: Modified independent  Sit to Supine: Modified independent     Transfers:  Sit to Stand: Modified independent  Stand to Sit: Modified independent                       Balance:   Sitting: Intact  Standing: Intact  Ambulation/Gait Training:  Distance (ft): 275 Feet (ft)     Ambulation - Level of Assistance: Independent     Gait Description (WDL): Exceptions to WDL  Gait Abnormalities: Antalgic              Speed/Pascale: Slow        Functional Measure:  Tinetti test:    Sitting Balance: 1  Arises: 1  Attempts to Rise: 2  Immediate Standing Balance: 2  Standing Balance: 2  Nudged: 2  Eyes Closed: 1  Turn 360 Degrees - Continuous/Discontinuous: 1  Turn 360 Degrees - Steady/Unsteady: 1  Sitting Down: 1  Balance Score: 14  Indication of Gait: 1  R Step Length/Height: 1  L Step Length/Height: 1  R Foot Clearance: 1  L Foot Clearance: 1  Step Symmetry: 0  Step Continuity: 1  Path: 1  Trunk: 1  Walking Time: 1  Gait Score: 9  Total Score: 23       Tinetti Test and G-code impairment scale:  Percentage of Impairment CH    0%   CI    1-19% CJ    20-39% CK    40-59% CL    60-79% CM    80-99% CN     100%   Tinetti  Score 0-28 28 23-27 17-22 12-16 6-11 1-5 0       Tinetti Tool Score Risk of Falls  <19 = High Fall Risk  19-24 = Moderate Fall Risk  25-28 = Low Fall Risk  Tinetti ME. Performance-Oriented Assessment of Mobility Problems in Elderly Patients. Rodriguez 66; [id number]. (Scoring Description: PT Bulletin Feb. 10, 1993)    Older adults: Arnol Issa et al, 2009; n = 1000 [facility] elderly evaluated with ABC, HUMBLE, ADL, and IADL)  · Mean HUMBLE score for males aged 69-68 years = 26.21(3.40)  · Mean HUMBLE score for females age 69-68 years = 25.16(4.30)  · Mean HUMBLE score for males over 80 years = 23.29(6.02)  · Mean HUMBLE score for females over 80 years = 17.20(8.32)         G codes: In compliance with CMSs Claims Based Outcome Reporting, the following G-code set was chosen for this patient based on their primary functional limitation being treated: The outcome measure chosen to determine the severity of the functional limitation was the Tinetti with a score of 23/28 which was correlated with the impairment scale.     ? Mobility - Walking and Moving Around:     - CURRENT STATUS: CI - 1%-19% impaired, limited or restricted    - GOAL STATUS: CI - 1%-19% impaired, limited or restricted    - D/C STATUS:  CI - 1%-19% impaired, limited or restricted  After treatment:   []   Patient left in no apparent distress sitting up in chair  [x]   Patient left in no apparent distress in bed  [x]   Call bell left within reach  [x]   Nursing notified  [x]   Caregiver present  []   Bed alarm activated    COMMUNICATION/EDUCATION:   Communication/Collaboration:  [x]   Fall prevention education was provided and the patient/caregiver indicated understanding. [x]   Patient/family have participated as able and agree with findings and recommendations. []   Patient is unable to participate in plan of care at this time.   Findings and recommendations were discussed with: Registered Nurse    Thank you for this referral.  Jamal Salmeron, PT   Time Calculation: 19 mins

## 2017-08-08 NOTE — PROGRESS NOTES
Bedside and Verbal shift change report given to Lebron Denise (oncoming nurse) by Aneesh Bean (offgoing nurse). Report included the following information SBAR, Kardex and MAR.     1051: Gave patient STAT Potassium order that ED did not admin at 1400.

## 2017-08-09 NOTE — PROGRESS NOTES
Hospitalist Progress Note  Bhanu Gonzalez MD  Office: [phone]  Cell: [phone]      Date of Service:  2017  NAME:  Day Thomason  :  1950  MRN:  [id number]      Admission Summary:   59-year-old    American female with past medical history of hypertension, sickle cell   trait, history of breast cancer diagnosed in . Subsequently   receiving chemotherapy and then bilateral mastectomy in . She   comes over here because of progressively worsening back pain since last 3 months    Interval history / Subjective:     F/u back pain     Assessment & Plan:     Back pain    -likely sec to probable mets  -Now controlled on Percocet    Probable met  -s/p biopsy , outpatient follow up with Dr Jinny Hoffman  -CT scan chest/abd/pelvis reviewed with the patient. Told her about the RLL mass, probable liver mets and probable adrenal mets    History of breast cancer   -s/p chemotherapy/surgery    HTN  -stable    Dizziness with visual changes  -MRI brain unremarkable    Hypokalemia  -stable    Regular diet    Code status: FULL CODE  DVT prophylaxis: scd  PTA: lives with sister at her house, takes care of her  as well    Plan: Discharge tomorrow    Care Plan discussed with: Patient/Family  Disposition: Home w/Family     Hospital Problems  Date Reviewed: 2017          Codes Class Noted POA    * (Principal)Metastatic disease ([facility] ICD-10-CM: C79.9  ICD-9-CM: 199.1  2017 Yes                Review of Systems:   A comprehensive review of systems was negative except for that written in the HPI. Vital Signs:    Last 24hrs VS reviewed since prior progress note.  Most recent are:  Visit Vitals    /77 (BP 1 Location: Left arm, BP Patient Position: At rest)    Pulse 88    Temp 98.2 °F (36.8 °C)    Resp 18    Ht 5' 7\" (1.702 m)    Wt 61.8 kg (136 lb 4 oz)    SpO2 97%    BMI 21.34 kg/m2       No intake or output data in the 24 hours ending 08/08/17 7493     Physical Examination:             Constitutional:  No acute distress, cooperative, pleasant    ENT:  Oral mucous moist, oropharynx benign. Neck supple,    Resp:  CTA bilaterally. No wheezing/rhonchi/rales. No accessory muscle use   CV:  Regular rhythm, normal rate, no murmurs, gallops, rubs    GI:  Soft, non distended, non tender. normoactive bowel sounds, no hepatosplenomegaly     Musculoskeletal:  No edema, warm, 2+ pulses throughout    Neurologic:  Moves all extremities. AAOx3, CN II-XII reviewed     Skin:  Good turgor, no rashes or ulcers       Data Review:    Review and/or order of clinical lab test      Labs:     Recent Labs      08/08/17 0325 08/07/17   1035   WBC  11.0  9.7   HGB  8.1*  8.4*   HCT  25.7*  27.3*   PLT  259  304     Recent Labs      08/08/17 0325  08/07/17   1035   NA  137  138   K  4.1  3.2*   CL  101  100   CO2  25  29   BUN  8  11   CREA  0.62  0.77   GLU  121*  95   CA  8.8  9.0     Recent Labs      08/08/17 0325  08/07/17   1035   SGOT  49*  58*   ALT  39  46   AP  310*  325*   TBILI  0.6  0.8   TP  7.3  8.3*   ALB  2.3*  2.7*   GLOB  5.0*  5.6*     No results for input(s): INR, PTP, APTT in the last 72 hours. No lab exists for component: INREXT   Recent Labs      08/08/17   0325   TIBC  158*   PSAT  15*   FERR  673*      Lab Results   Component Value Date/Time    Folate 13.0 08/08/2017 03:25 AM      No results for input(s): PH, PCO2, PO2 in the last 72 hours. No results for input(s): CPK, CKNDX, TROIQ in the last 72 hours.     No lab exists for component: CPKMB  Lab Results   Component Value Date/Time    Cholesterol, total 285 03/31/2016 12:00 AM    HDL Cholesterol 91 03/31/2016 12:00 AM    LDL, calculated 174 03/31/2016 12:00 AM    Triglyceride 98 03/31/2016 12:00 AM     Lab Results   Component Value Date/Time    Glucose (POC) 94 03/03/2017 04:12 PM    Glucose POC 94 06/21/2017 03:29 PM     Lab Results   Component Value Date/Time Color DARK YELLOW 08/07/2017 06:32 PM    Appearance CLOUDY 08/07/2017 06:32 PM    Specific gravity 1.026 08/07/2017 06:32 PM    pH (UA) 5.0 08/07/2017 06:32 PM    Protein 30 08/07/2017 06:32 PM    Glucose NEGATIVE  08/07/2017 06:32 PM    Ketone 15 08/07/2017 06:32 PM    Urobilinogen 1.0 08/07/2017 06:32 PM    Nitrites NEGATIVE  08/07/2017 06:32 PM    Leukocyte Esterase NEGATIVE  08/07/2017 06:32 PM    Epithelial cells FEW 08/07/2017 06:32 PM    Bacteria NEGATIVE  08/07/2017 06:32 PM    WBC 0-4 08/07/2017 06:32 PM    RBC 0-5 08/07/2017 06:32 PM         Medications Reviewed:     Current Facility-Administered Medications   Medication Dose Route Frequency    therapeutic multivitamin (THERAGRAN) tablet 1 Tab  1 Tab Oral DAILY    hydroCHLOROthiazide (HYDRODIURIL) tablet 25 mg  25 mg Oral DAILY    aspirin delayed-release tablet 81 mg  81 mg Oral DAILY    sodium chloride (NS) flush 5-10 mL  5-10 mL IntraVENous Q8H    sodium chloride (NS) flush 5-10 mL  5-10 mL IntraVENous PRN    0.9% sodium chloride infusion  100 mL/hr IntraVENous CONTINUOUS    acetaminophen (TYLENOL) tablet 650 mg  650 mg Oral Q4H PRN    oxyCODONE-acetaminophen (PERCOCET) 5-325 mg per tablet 1 Tab  1 Tab Oral Q4H PRN    HYDROmorphone (PF) (DILAUDID) injection 1 mg  1 mg IntraVENous Q4H PRN    ondansetron (ZOFRAN) injection 4 mg  4 mg IntraVENous Q4H PRN    enoxaparin (LOVENOX) injection 40 mg  40 mg SubCUTAneous Q24H    polyethylene glycol (MIRALAX) packet 17 g  17 g Oral DAILY PRN    prochlorperazine (COMPAZINE) 5 mg in 0.9% sodium chloride 50 mL IVPB  5 mg IntraVENous Q6H PRN     ______________________________________________________________________  EXPECTED LENGTH OF STAY: 3d 0h  ACTUAL LENGTH OF STAY:          1                 Melissa Maldonado MD

## 2017-08-09 NOTE — DISCHARGE SUMMARY
Discharge Summary       PATIENT ID: Celine Mittal  MRN: [id number]   YOB: 1950    DATE OF ADMISSION: 8/7/2017  9:50 AM    DATE OF DISCHARGE: 8/9/2017   PRIMARY CARE PROVIDER: Valentino Dearth., MD     ATTENDING PHYSICIAN: Dr Paola Douglass  DISCHARGING PROVIDER: Paola Douglass MD    To contact this individual call [phone] and ask the  to page. If unavailable ask to be transferred the Adult Hospitalist Department. CONSULTATIONS: IP CONSULT TO HOSPITALIST  IP CONSULT TO ONCOLOGY    PROCEDURES/SURGERIES: * No surgery found *    ADMITTING DIAGNOSES & HOSPITAL COURSE:   Back pain    -likely sec to probable mets  -Now controlled on Percocet, some nausea  -Appreciate Dr Saul Harman, already wrote prescriptions     Probable met  -s/p biopsy 8/7, outpatient follow up with Dr Saul Harman  -Sarah Gonzalez scan chest/abd/pelvis reviewed with the patient. Told her about the RLL mass, probable liver mets and probable adrenal mets     History of breast cancer 2012  -s/p chemotherapy/surgery     HTN  -stable     Dizziness with visual changes  -MRI brain unremarkable     Hypokalemia  -stable     Regular diet     Code status: FULL CODE  DVT prophylaxis: scd  PTA: lives with sister at her house, takes care of her  as well        [address] / PLAN:      1. Back pain  2.  Probable metastatic breast cancer       PENDING TEST RESULTS:   At the time of discharge the following test results are still pending: none    FOLLOW UP APPOINTMENTS:    Follow-up Information     Follow up With Details Comments Contact Info    Valentino Dearth., MD In 1 week  [facility] [address]      Aleks Mckeon MD In 1 week  [place] [address]  [phone]             ADDITIONAL CARE RECOMMENDATIONS:   Follow up with PMD and Dr Saul Harman in 1 week    DIET: Cardiac Diet    ACTIVITY: Activity as tolerated      DISCHARGE MEDICATIONS:  Current Discharge Medication List START taking these medications    Details   ondansetron (ZOFRAN ODT) 4 mg disintegrating tablet Take 1 Tab by mouth every six (6) hours as needed. Qty: 30 Tab, Refills: 6      oxyCODONE ER (OXYCONTIN) 10 mg ER tablet Take 1 Tab by mouth every twelve (12) hours. Max Daily Amount: 20 mg.  Qty: 60 Tab, Refills: 0      oxyCODONE-acetaminophen (PERCOCET) 5-325 mg per tablet Take 1 Tab by mouth every four (4) hours as needed. Max Daily Amount: 6 Tabs. Qty: 180 Tab, Refills: 0         CONTINUE these medications which have NOT CHANGED    Details   aspirin delayed-release 81 mg tablet Take 81 mg by mouth daily. hydroCHLOROthiazide (HYDRODIURIL) 25 mg tablet TAKE 1 TABLET BY MOUTH DAILY AFTER BREAKFAST  Qty: 30 Tab, Refills: 0    Associated Diagnoses: Essential hypertension, benign      multivitamin (ONE A DAY) tablet Take 1 Tab by mouth daily (after breakfast). Takes when remembered. STOP taking these medications       ibuprofen (MOTRIN) 800 mg tablet Comments:   Reason for Stopping:                 NOTIFY YOUR PHYSICIAN FOR ANY OF THE FOLLOWING:   Fever over 101 degrees for 24 hours. Chest pain, shortness of breath, fever, chills, nausea, vomiting, diarrhea, change in mentation, falling, weakness, bleeding. Severe pain or pain not relieved by medications. Or, any other signs or symptoms that you may have questions about.     DISPOSITION:  x  Home With:   OT  PT  HH  RN       Long term SNF/Inpatient Rehab    Independent/assisted living    Hospice    Other:       PATIENT CONDITION AT DISCHARGE:     Functional status    Poor     Deconditioned    x Independent      Cognition    x Lucid     Forgetful     Dementia      Catheters/lines (plus indication)    Soto     PICC     PEG    x None      Code status    x Full code     DNR      PHYSICAL EXAMINATION AT DISCHARGE:  Please see progress note      CHRONIC MEDICAL DIAGNOSES:  Problem List as of 8/9/2017  Date Reviewed: 8/7/2017          Codes Class Noted - Resolved    * (Principal)Metastatic disease [facility]) ICD-10-CM: C79.9  ICD-9-CM: 199.1  8/7/2017 - Present        Breast cancer, stage 2 ([facility] ICD-10-CM: C50.919  ICD-9-CM: 174.9  9/28/2012 - Present              Greater than 39 minutes were spent with the patient on counseling and coordination of care    Signed:   Melissa Maldonado MD  8/9/2017  12:02 PM

## 2017-08-09 NOTE — PROGRESS NOTES
Bedside and Verbal shift change report given to Key Frances (oncoming nurse) by Cristin Muse (offgoing nurse). Report included the following information SBAR, Kardex and MAR.

## 2017-08-09 NOTE — PROGRESS NOTES
11:41 AM  Patient reviewed in rounds. CM informed that patient will be discharged today. There are no CM consults or needs at this time. Mode of transport at time of discharge to be provided by patient's sister. CM awaiting discharge orders.     ARMANDO Cotter/HARDY

## 2017-08-09 NOTE — PROGRESS NOTES
Hospitalist Progress Note  Divina Ashton MD  Office: [phone]  Cell:       Date of Service:  2017  NAME:  Yoana Singh  :  1950  MRN:  [id number]      Admission Summary:   42-year-old    American female with past medical history of hypertension, sickle cell   trait, history of breast cancer diagnosed in . Subsequently   receiving chemotherapy and then bilateral mastectomy in . She   comes over here because of progressively worsening back pain since last 3 months    Interval history / Subjective:     F/u back pain  Under control     Assessment & Plan:     Back pain    -likely sec to probable mets  -Now controlled on Percocet, some nausea  -Appreciate Dr Debe Kocher, already wrote prescriptions    Probable met  -s/p biopsy , outpatient follow up with Dr Debe Kocher  -CT scan chest/abd/pelvis reviewed with the patient. Told her about the RLL mass, probable liver mets and probable adrenal mets    History of breast cancer   -s/p chemotherapy/surgery    HTN  -stable    Dizziness with visual changes  -MRI brain unremarkable    Hypokalemia  -stable    Regular diet    Code status: FULL CODE  DVT prophylaxis: scd  PTA: lives with sister at her house, takes care of her  as well    Plan: Discharge today    Care Plan discussed with: Patient/Family  Disposition: Home w/Family     Hospital Problems  Date Reviewed: 2017          Codes Class Noted POA    * (Principal)Metastatic disease ([facility] [address] ICD-10-CM: C79.9  ICD-9-CM: 199.1  2017 Yes                Review of Systems:   A comprehensive review of systems was negative except for that written in the HPI. Vital Signs:    Last 24hrs VS reviewed since prior progress note.  Most recent are:  Visit Vitals    /70 (BP 1 Location: Left arm, BP Patient Position: At rest)    Pulse 81    Temp 98 °F (36.7 °C)    Resp 18    Ht 5' 7\" (1.702 m)    Wt 61.8 kg (136 lb 4 oz)    SpO2 97%    BMI 21.34 kg/m2       No intake or output data in the 24 hours ending 08/09/17 1148     Physical Examination:             Constitutional:  No acute distress, cooperative, pleasant    ENT:  Oral mucous moist, oropharynx benign. Neck supple,    Resp:  CTA bilaterally. No wheezing/rhonchi/rales. No accessory muscle use   CV:  Regular rhythm, normal rate, no murmurs, gallops, rubs    GI:  Soft, non distended, non tender. normoactive bowel sounds, no hepatosplenomegaly     Musculoskeletal:  No edema, warm, 2+ pulses throughout    Neurologic:  Moves all extremities. AAOx3, CN II-XII reviewed     Skin:  Good turgor, no rashes or ulcers       Data Review:    Review and/or order of clinical lab test      Labs:     Recent Labs      08/08/17 0325 08/07/17   1035   WBC  11.0  9.7   HGB  8.1*  8.4*   HCT  25.7*  27.3*   PLT  259  304     Recent Labs      08/08/17   0325  08/07/17   1035   NA  137  138   K  4.1  3.2*   CL  101  100   CO2  25  29   BUN  8  11   CREA  0.62  0.77   GLU  121*  95   CA  8.8  9.0     Recent Labs      08/08/17   0325  08/07/17   1035   SGOT  49*  58*   ALT  39  46   AP  310*  325*   TBILI  0.6  0.8   TP  7.3  8.3*   ALB  2.3*  2.7*   GLOB  5.0*  5.6*     No results for input(s): INR, PTP, APTT in the last 72 hours. No lab exists for component: INREXT, INREXT   Recent Labs      08/08/17   0325   TIBC  158*   PSAT  15*   FERR  673*      Lab Results   Component Value Date/Time    Folate 13.0 08/08/2017 03:25 AM      No results for input(s): PH, PCO2, PO2 in the last 72 hours. No results for input(s): CPK, CKNDX, TROIQ in the last 72 hours.     No lab exists for component: CPKMB  Lab Results   Component Value Date/Time    Cholesterol, total 285 03/31/2016 12:00 AM    HDL Cholesterol 91 03/31/2016 12:00 AM    LDL, calculated 174 03/31/2016 12:00 AM    Triglyceride 98 03/31/2016 12:00 AM     Lab Results   Component Value Date/Time    Glucose (POC) 94 03/03/2017 04:12 PM Glucose POC 94 06/21/2017 03:29 PM     Lab Results   Component Value Date/Time    Color DARK YELLOW 08/07/2017 06:32 PM    Appearance CLOUDY 08/07/2017 06:32 PM    Specific gravity 1.026 08/07/2017 06:32 PM    pH (UA) 5.0 08/07/2017 06:32 PM    Protein 30 08/07/2017 06:32 PM    Glucose NEGATIVE  08/07/2017 06:32 PM    Ketone 15 08/07/2017 06:32 PM    Urobilinogen 1.0 08/07/2017 06:32 PM    Nitrites NEGATIVE  08/07/2017 06:32 PM    Leukocyte Esterase NEGATIVE  08/07/2017 06:32 PM    Epithelial cells FEW 08/07/2017 06:32 PM    Bacteria NEGATIVE  08/07/2017 06:32 PM    WBC 0-4 08/07/2017 06:32 PM    RBC 0-5 08/07/2017 06:32 PM         Medications Reviewed:     Current Facility-Administered Medications   Medication Dose Route Frequency    oxyCODONE ER (OxyCONTIN) tablet 10 mg  10 mg Oral Q12H    ondansetron (ZOFRAN ODT) tablet 4 mg  4 mg Oral Q6H PRN    prochlorperazine (COMPAZINE) with saline injection 5 mg  5 mg IntraVENous Q6H PRN    therapeutic multivitamin (THERAGRAN) tablet 1 Tab  1 Tab Oral DAILY    hydroCHLOROthiazide (HYDRODIURIL) tablet 25 mg  25 mg Oral DAILY    aspirin delayed-release tablet 81 mg  81 mg Oral DAILY    sodium chloride (NS) flush 5-10 mL  5-10 mL IntraVENous Q8H    sodium chloride (NS) flush 5-10 mL  5-10 mL IntraVENous PRN    0.9% sodium chloride infusion  100 mL/hr IntraVENous CONTINUOUS    acetaminophen (TYLENOL) tablet 650 mg  650 mg Oral Q4H PRN    oxyCODONE-acetaminophen (PERCOCET) 5-325 mg per tablet 1 Tab  1 Tab Oral Q4H PRN    HYDROmorphone (PF) (DILAUDID) injection 1 mg  1 mg IntraVENous Q4H PRN    ondansetron (ZOFRAN) injection 4 mg  4 mg IntraVENous Q4H PRN    enoxaparin (LOVENOX) injection 40 mg  40 mg SubCUTAneous Q24H    polyethylene glycol (MIRALAX) packet 17 g  17 g Oral DAILY PRN     ______________________________________________________________________  EXPECTED LENGTH OF STAY: 3d 0h  ACTUAL LENGTH OF STAY:          2                 Norberto Mueller MD

## 2017-08-09 NOTE — CONSULTS
-Hematology / Oncology ([facility]) -    -CC- suspected metastatic disease, hx breast cancer    78 yo woman with history of L breast cancer 2013 who is admitted after presenting with progressive back pain x 3 mo, currently severe, refractory to ibuprofen, associated with poor apetite x  3 wks with 7 lb wt loss, and with drenching night sweats x 1 mo several times per week. Recent MRI of the L spine suggested diffuse metastasis to spine as well as liver metastasis. Regarding hx of breast cancer: s/p neoadjuvant chemo (dose-dense AC-T 10/25/12 - 1/30/13, Dr bAdias Campuzano) and B mastectomy 2013 (triple negative, 1.8 cm grade 2 residual dz; previous SLN bxs 10/2012 neg and initial tumor size estimated to have been at least 2 cm [T2N0= stage II ]). Not adherent to followup since 2013. Has been referred by her PCP to Dr Nikunj Newton for oncology assessment, has not yet seen Dr Nikunj Newton but does wish to stick with that arrangement. Complete ROS notable for some recent visual changes (\"orbs\" in lateral L visual field), chronic nausea and reflux. Otherwise neg x as above.     PMHX: breast cancer, gerd, htn, sickle trait, diverticulosis  PSURGHX: B mastectomy with reconstruction 2013, hysterectomy  SOCHX: caretaker for  (multiple issues including dementia), lives with a sister, moved from [place], previously >3 etoh/d but minimal since move, no tob, former   Rachana De La Fuente: mother had colon cancer    -O-      Patient Vitals for the past 24 hrs:   Temp Pulse Resp BP SpO2   08/08/17 1413 98 °F (36.7 °C) 100 18 144/79 99 %   08/08/17 0842 98.5 °F (36.9 °C) 81 18 125/82 98 %   08/08/17 0327 - - - 155/85 -   08/08/17 0214 100.3 °F (37.9 °C) 98 18 (!) 185/96 98 %   08/07/17 2321 - - - 157/86 -   08/07/17 2208 98.4 °F (36.9 °C) 94 19 (!) 179/96 99 %        Gen: resting in bed, sister present  H+N: oral cavity moist  Lymph: no palpable JAZMIN neck, ax  Chest: abnormal breath sounds RULF  CV: RRR  Abd: s/nt  Extr: no edema  Skin: no excessive bruising  Neuro: moving all extremities  Breast: s/p b mastectomy and reconstruction    -Labs-    Recent Labs      08/08/17   0325  08/07/17   1035   WBC  11.0  9.7   HGB  8.1*  8.4*   PLT  259  304   ANEU  8.7*  7.1   NA  137  138   K  4.1  3.2*   GLU  121*  95   BUN  8  11   CREA  0.62  0.77   ALT  39  46   SGOT  49*  58*   TBILI  0.6  0.8   AP  310*  325*   CA  8.8  9.0       -Imaging-   7/29/17- MRI L spine - Suspect widespread metastatic disease involving the spine and liver and pelvis. L3 vertebra appears largely replaced by tumor. Images personally reviewed. 8/7/17- Head CT w/o contrast- normal    -Assessment + Plan-     *) bone lesions-  bx pelvis done 8/7/17  *) Elevated globulin fx, suspect reactive, r/o MM -  Not back yet  *) multiple liver lesions c/w mets, with abnormal LFTs-   *) abnormal breath sounds RULF- one 3 cm lung met  *) visual changes- MRI brain normal  *) anemia- new since 3 mo ago; normocytic. Suspect etiology chronic dz. Lab workup. *) pain- percocet was started, follow  *) hx breast cancer- check tumor markers    Suspect metastatic breast cancer; path pending. Should be OK to d/c tomorrow.

## 2017-08-09 NOTE — PROGRESS NOTES
Problem: Self Care Deficits Care Plan (Adult)  Goal: *Acute Goals and Plan of Care (Insert Text)  Occupational Therapy Goals  Initiated 8/8/017    1. Patient will perform lower body dressing with overall Mod I using lower body dressing AE PRN within 7 days. 2. Patient will participate in bed mobility with compensatory techniques to reduce pain and further injury within 7 days. 3. Patient will demonstrate proper body mechanics for assisting  with mobility and ADLs within 7 days. 4. Patient will complete upper body therapeutic exercises/activities to maximize strength and functional mobility within 7 days. OCCUPATIONAL THERAPY TREATMENT  Patient: Izabel Emerson (18 y.o. female)  Date: 8/9/2017  Diagnosis: Metastatic disease ([facility] [address] Metastatic disease ([facility] [address]       Precautions:    Chart, occupational therapy assessment, plan of care, and goals were reviewed. ASSESSMENT:  Based on the objective data below, the patient reports increased ease of self care and transfer on and off of toilet after instruction in use of AE and BSC. Family present for education. Patient reports plan to purchase equipment. Issued catalogue of pictures and information to aide in purchasing the products at her choice of suppliers. Trialed use of log roll and use of leg  to ease pain of supine to/from sit, but patient reports this did not make a difference. Note plan for discharge home with family assist.   Progression toward goals:  [X]          Improving appropriately and progressing toward goals  [ ]          Improving slowly and progressing toward goals  [ ]          Not making progress toward goals and plan of care will be adjusted       PLAN:  Patient continues to benefit from skilled intervention to address the above impairments. Continue treatment per established plan of care.   Discharge Recommendations:  None  Further Equipment Recommendations for Discharge:  none       SUBJECTIVE:   Patient stated I wish I had had that a couple days ago.  referring to [facility] over the toilet      OBJECTIVE DATA SUMMARY:   Cognitive/Behavioral Status:  Neurologic State: Alert  Orientation Level: Oriented X4  Cognition: Appropriate decision making; Appropriate for age attention/concentration; Follows commands; Appropriate safety awareness  Perception: Appears intact  Perseveration: No perseveration noted  Safety/Judgement: Awareness of environment  Functional Mobility and Transfers for ADLs:              Bed Mobility:  Rolling: Modified independent  Supine to Sit: Modified independent (instructed in use of log roll to attempt to decrease pain with movement, but did not help)  Sit to Supine: Modified independent                   Transfers:  Sit to Stand: Supervision;Assist x1  Functional Transfers  Toilet Transfer : Supervision;Assist x1 (BSC over toilet)  Adaptive Equipment: Bedside commode (over toilet; instructed in use of 3 in 1 for next to bed, over toilet and in shower.)     Balance:     ADL Intervention:                    Lower Body Bathing  Adaptive Equipment: Long handled sponge (instructed in use for LEs while seated)  Position Performed: Seated in chair           Lower Body Dressing Assistance  Dressing Assistance:  (after instruction in use of AE)  Underpants: Supervision/set-up  Socks: Supervision/set-up  Leg Crossed Method Used: No  Position Performed: Seated edge of bed;Standing  Adaptive Equipment Used: Dressing stick; Reacher;Sock aid; Long handled shoe horn; Other(comment) (elastic shoelaces; instructed in and practiced use of all of the above)           Cognitive Retraining  Safety/Judgement: Awareness of environment        Activity Tolerance:    Fair  Please refer to the flowsheet for vital signs taken during this treatment.   After treatment:   [ ]  Patient left in no apparent distress sitting up in chair  [X]  Patient left in no apparent distress in bed  [X]  Call bell left within reach  [X]  Nursing notified  [X]  Caregiver present(2 family members)  [ ]  Bed alarm activated      COMMUNICATION/COLLABORATION:   The patients plan of care was discussed with: Registered Nurse     LUCI Weathers  Time Calculation: 35 mins

## 2017-08-09 NOTE — PROGRESS NOTES
Bedside shift change report given to Carvin Bence and Reynold Banda RN (oncoming nurse) by Roman Colón (offgoing nurse). Report included the following information SBAR, Kardex, ED Summary, Procedure Summary, Intake/Output, MAR and Recent Results.

## 2017-08-09 NOTE — PROGRESS NOTES
-Hematology / Oncology ([facility]) -    -CC- metastatic dz, hx breast cancer    -S-  Pain improved with percocet, requiring frequent use, some N with med.    -O-    Patient Vitals for the past 24 hrs:   Temp Pulse Resp BP SpO2   08/09/17 0835 98 °F (36.7 °C) 81 18 113/70 97 %   08/09/17 0142 100.2 °F (37.9 °C) 99 18 144/84 100 %   08/08/17 2116 98.2 °F (36.8 °C) 88 18 126/77 97 %   08/08/17 1413 98 °F (36.7 °C) 100 18 144/79 99 %        Gen: nad. Walking around room. Sister present  Chest: bilateral breath sounds present  Cardiac: rrr  Abd: s/nt    -Labs-    Recent Labs      08/08/17   0325  08/07/17   1035   WBC  11.0  9.7   HGB  8.1*  8.4*   PLT  259  304   ANEU  8.7*  7.1   NA  137  138   K  4.1  3.2*   GLU  121*  95   BUN  8  11   CREA  0.62  0.77   ALT  39  46   SGOT  49*  58*   TBILI  0.6  0.8   AP  310*  325*   CA  8.8  9.0     8/7: UA- few epi  8/8: b12: 723, fol 13, iron 23, tibc 158, sharda 673  -- ca 15-3: 2194; ca 29-29: 3843    PENDING 8/7: spep/ NEAL, SFLC    -Imaging-   7/29/17- MRI L spine - Suspect widespread metastatic disease involving the spine and liver and pelvis. L3 vertebra appears largely replaced by tumor. Images personally reviewed.     8/7/17- Head CT w/o contrast- normal   8/7/17- MRI brain- Essentially normal   8/7/17: CT c-a-p (w):   1. 2.6 cm x 2.8 cm right lower lobe lesion consistent with metastatic disease. 8 mm lingular nodule. 2. Extensive hepatic metastatic disease. 3. Osseous metastatic disease, as described above. 4. Bilateral adrenal lesions, indeterminate, but worrisome for metastatic deposit. 5. Trace fluid in the pelvis    8/8- bone scan- IMPRESSION: Multiple foci of abnormal tracer activity in the skeleton,  corresponding to lytic bone lesions seen on CT.  These findings are compatible  with osseous metastatic disease.    -Assessment + Plan-     *) bone lesions-  bx pelvis done 8/7/17  *) Elevated globulin fx, suspect reactive, r/o MM -  Not back yet  *) multiple liver lesions c/w mets, with abnormal LFTs-   *) abnormal breath sounds - one 3 cm lung met  *) visual changes- MRI brain normal  *) anemia- new since 3 mo ago; normocytic. Lab so far c/w chronic dz.  *) pain- tolerating percocet, requiring frequent use. Start oxycontin 10  *) nausea- zofran helping  *) hx breast cancer-  tumor markers elevated, suspect metastatic breast cancer, f/u bx. Discussed with patient and sister, recommended she f/u next week for review of bx and planning for chemotherapy. Discussed possible options such as single-agent xeloda vs combination tx. Also discussed anti-resorption agents for bone dz. Writing prescriptions for percocet, oxycontin, zofran odt. Looks ok for discharge today.    Discussed with nursing

## 2017-08-09 NOTE — DISCHARGE INSTRUCTIONS
Discharge Instructions       PATIENT ID: Benito Puente  MRN: [id number]   YOB: 1950    DATE OF ADMISSION: 8/7/2017  9:50 AM    DATE OF DISCHARGE: 8/9/2017    PRIMARY CARE PROVIDER: Cayden Tesfaye MD     ATTENDING PHYSICIAN: Alondra Vazquez MD  DISCHARGING PROVIDER: Alondra Vazquez MD    To contact this individual call [phone] and ask the  to page. If unavailable ask to be transferred the Adult Hospitalist Department. DISCHARGE DIAGNOSES   Back pain      CONSULTATIONS: IP CONSULT TO HOSPITALIST  IP CONSULT TO ONCOLOGY    PROCEDURES/SURGERIES: * No surgery found *    PENDING TEST RESULTS:   At the time of discharge the following test results are still pending: bone biopsy     FOLLOW UP APPOINTMENTS:   Follow-up Information     Follow up With Details Comments Contact Info    Cayden Tesfaye MD In 1 week  [address] [address]  [phone]             ADDITIONAL CARE RECOMMENDATIONS:   Follow up with Dr Reno Huggins in 1 week    DIET: Cardiac Diet     ACTIVITY: Activity as tolerated      DISCHARGE MEDICATIONS:   See Medication Reconciliation Form    · It is important that you take the medication exactly as they are prescribed. · Keep your medication in the bottles provided by the pharmacist and keep a list of the medication names, dosages, and times to be taken in your wallet. · Do not take other medications without consulting your doctor. NOTIFY YOUR PHYSICIAN FOR ANY OF THE FOLLOWING:   Fever over 101 degrees for 24 hours. Chest pain, shortness of breath, fever, chills, nausea, vomiting, diarrhea, change in mentation, falling, weakness, bleeding. Severe pain or pain not relieved by medications. Or, any other signs or symptoms that you may have questions about.       DISPOSITION:   x Home With:   OT  PT  HH  RN       SNF/Inpatient Rehab/LTAC    Independent/assisted living    Hospice    Other:     CDMP Checked:   Yes x     PROBLEM LIST Updated:  Yes x Signed:   Flor Curtis MD  8/9/2017  11:50 AM

## 2017-08-10 NOTE — PROGRESS NOTES
[facility]  Date/Time:  8/10/2017 4:23 PM  Patient listed on combined discharge report on 8/10/2017. Patient discharged from [facility] for back, right sided pain. RRAT score: 16 Moderate  Medical History:     Past Medical History:   Diagnosis Date    Anxiety and depression     Breast CA ([address]     Left only but had a bilateral mastectomy/chemo    GERD (gastroesophageal reflux disease)     not an issure, no medications    Hypertension     Ill-defined condition     pinched nerve in back    Other ill-defined conditions     last chemo treatment 13    Sickle cell trait [facility])      Nurse Navigator(NN) contacted the patient by telephone to perform post hospital discharge assessment. Spoke with the patient's sisters, Beltran Gilliam and Addy. Verified  and address as patient identifiers. Provided introduction to self, and explanation of the Nurses Navigator role. They state that the patient is resting at this time and that are willing to discuss the patient's health at this time. They state tat the patient has been experiencing a mild amount of nausea which they relate to the oral pain control. Discharge Instructions :  Reviewed discharge instructions with the patient's sisters. They understand that the referral to [facility] is in process. PCP/Specialist follow up: Patient scheduled to follow up with Mike Graf MD on [id number]. Reviewed red flags with patient, and patient verbalizes understanding. Patient's sisters given an opportunity to ask questions. We developed goals from their questions as outlined below. They agree to contact the PCP office for questions related to their healthcare. They expressed thanks, offered no additional questions and ended the call. Case management plan: Attempt to contact the patient by telephone or during office visit within the next 7-10 days.  Will continue to follow as necessary for the next 30 days. Will reassess for case management needs prior to discharge from case management service on or about 30 days. Goals      Supportive resources in place to maintain patient in the community. The patient is the primary caregiver for her disabled spouse. Last inpatient stay r/t pain from metastatic disease. The following resources would benefit the patient and her spouse. 1.Family to explore personal care agencies in the [facility] area. 2. Family to discuss referral to Critical access hospital Palliative care service during the next office visit. 3. Family to apply for Medicaid. 4. Family to request for [facility] from     Plan: follow up on goal progress in about 2 weeks. The patient's sister verbalized understanding of all information discussed. Patient has this Nurse Navigators contact information for any further questions, concerns, or needs.

## 2017-08-11 PROBLEM — E87.1 HYPONATREMIA: Status: ACTIVE | Noted: 2017-01-01

## 2017-08-11 PROBLEM — N17.9 AKI (ACUTE KIDNEY INJURY) (HCC): Status: ACTIVE | Noted: 2017-01-01

## 2017-08-11 PROBLEM — D72.829 LEUKOCYTOSIS: Status: ACTIVE | Noted: 2017-01-01

## 2017-08-11 PROBLEM — C50.919 METASTATIC BREAST CANCER (HCC): Status: ACTIVE | Noted: 2017-01-01

## 2017-08-11 NOTE — PROGRESS NOTES
Bedside and Verbal shift change report given to javier (oncoming nurse) by michel SANCHEZ (offgoing nurse). Report included the following information SBAR and Kardex.    Primary Nurse Iesha Ontiveros and Dae Parker RN performed a dual skin assessment on this patient No impairment noted  Ciaran score is 21  Dry skin noted

## 2017-08-11 NOTE — ED NOTES
Pt 22G IV in L forearm is patent and saline locked. Site is clean dry and intact. VSS. O2 sats 98% on RA. Pt reports no nausea at this time. Pain 0/10.  Pt in stable condition at the time of transfer inpatient

## 2017-08-11 NOTE — PROGRESS NOTES
Noted pt's current treatment in ED. Pt recently hospitalized here on 8/7-8/9 and returned home. Per previous CM note completed during that admission, \"Patient resides in a 2 story home with her sister. There are 5 steps to enter into the home, 9 steps to reach second level, and 5 steps down to basement. Patient is independent with ADL's and IADL's and utilizes no DME. Patient receives [employer] and [employer] as source of income. Patient reports no financial stressors and or concerns at this time. Patient utilizes [employer] for prescriptions. Mode of transport at time of discharge to be provided by patient's family. \"    ED CM can assist as needed.   ARMANDO Guerrero

## 2017-08-11 NOTE — ROUTINE PROCESS
TRANSFER - OUT REPORT:    Verbal report given to [facility] RN(name) on [facility]  being transferred to [address](unit) for routine progression of care       Report consisted of patients Situation, Background, Assessment and   Recommendations(SBAR). Information from the following report(s) SBAR, ED Summary, [facility] and Recent Results was reviewed with the receiving nurse. Lines:   Peripheral IV 08/11/17 Left Forearm (Active)   Site Assessment Clean, dry, & intact 8/11/2017  1:45 PM   Phlebitis Assessment 0 8/11/2017  1:45 PM   Infiltration Assessment 0 8/11/2017  1:45 PM   Dressing Status Clean, dry, & intact 8/11/2017  1:45 PM   Dressing Type Transparent 8/11/2017  1:45 PM   Hub Color/Line Status Blue;Flushed;Patent 8/11/2017  1:45 PM   Action Taken Blood drawn 8/11/2017  1:45 PM       Peripheral IV 08/11/17 Right Forearm (Active)   Site Assessment Clean, dry, & intact 8/11/2017  3:44 PM   Phlebitis Assessment 0 8/11/2017  3:44 PM   Infiltration Assessment 0 8/11/2017  3:44 PM   Dressing Status Clean, dry, & intact 8/11/2017  3:44 PM   Dressing Type Transparent 8/11/2017  3:44 PM   Hub Color/Line Status Blue;Flushed;Patent 8/11/2017  3:44 PM   Action Taken Blood drawn 8/11/2017  3:44 PM        Opportunity for questions and clarification was provided.       Patient transported with:   transportation

## 2017-08-11 NOTE — IP AVS SNAPSHOT
[address] [facility] [address] [place] [address] 
[phone] Patient: Essence Gannon MRN: [id number] List of hospitals in the [place]:7/3/2948 You are allergic to the following Allergen Reactions Codeine Nausea Only Recent Documentation Height Weight BMI OB Status Smoking Status 1.702 m 61.7 kg 21.3 kg/m2 Hysterectomy Never Smoker Unresulted Labs Order Current Status SAMPLE TO BLOOD BANK In process CULTURE, BLOOD, PAIRED Preliminary result Emergency Contacts  (Rel.) Home Phone Work Phone Mobile Phone Libby Phillips (Sister) [phone] -- -- About your hospitalization You were admitted on:  August 11, 2017 You last received care in the:  [facility] You were discharged on:  August 16, 2017 Why you were hospitalized Your primary diagnosis was:  Mariusz (Acute Kidney Injury) (Hcc) Your diagnoses also included:  Hyponatremia, Leukocytosis, Metastatic Breast Cancer (Hcc) Providers Seen During Your Hospitalizations Provider Role Specialty Primary office phone Adolfo Rosa MD Attending Provider Emergency Medicine [phone] Billy Rao MD Attending Provider Internal Medicine [phone] Tiffanie Ogden MD Attending Provider Internal Medicine [phone] Estephania Bain MD Attending Provider Hospitalist [phone] Guanakito Smith MD Attending Provider Internal Medicine [phone] Your Primary Care Physician (PCP) Primary Care Physician Office Phone Office Fax [address] [facility], [address] [place] [address] [phone] Follow-up Information Follow up With Details Comments Contact Info Horace Martinez MD In 1 week  [phone] [place] [address] 
[phone] Gilda Salinas MD In 1 week  [address] [facility] 7 [address] [phone] Appointments for Next 14 days 8/29/2017  9:15 AM  [address] [place] [address] - [address] [place] [address] Current Discharge Medication List  
  
START taking these medications Dose & Instructions Dispensing Information Comments Morning Noon Evening Bedtime  
 enoxaparin 100 mg/mL Commonly known as:  LOVENOX Your last dose was: Your next dose is:    
   
   
 Dose:  1 mg/kg 60 mg by SubCUTAneous route every twelve (12) hours every twelve (12) hours. Quantity:  60 Syringe Refills:  2  
     
   
   
   
  
 oxyCODONE IR 5 mg immediate release tablet Commonly known as:  Danay Carmen Replaces:  oxyCODONE ER 10 mg ER tablet Your last dose was: Your next dose is:    
   
   
 Dose:  5 mg Take 1 Tab by mouth every four (4) hours as needed. Max Daily Amount: 30 mg.  
 Quantity:  30 Tab Refills:  0 CONTINUE these medications which have NOT CHANGED Dose & Instructions Dispensing Information Comments Morning Noon Evening Bedtime  
 multivitamin tablet Commonly known as:  ONE A DAY Your last dose was: Your next dose is:    
   
   
 Dose:  1 Tab Take 1 Tab by mouth daily (after breakfast). Takes when remembered. Refills:  0  
     
   
   
   
  
 ondansetron 4 mg disintegrating tablet Commonly known as:  ZOFRAN ODT Your last dose was: Your next dose is:    
   
   
 Dose:  4 mg Take 1 Tab by mouth every six (6) hours as needed. Quantity:  30 Tab Refills:  6 STOP taking these medications   
 aspirin delayed-release 81 mg tablet  
   
  
 oxyCODONE ER 10 mg ER tablet Commonly known as:  OxyCONTIN Replaced by:  oxyCODONE IR 5 mg immediate release tablet Where to Get Your Medications Information on where to get these meds will be given to you by the nurse or doctor. ! Ask your nurse or doctor about these medications  
  enoxaparin 100 mg/mL oxyCODONE IR 5 mg immediate release tablet Discharge Instructions Discharge Instructions PATIENT ID: Celine Mittal MRN: [id number] YOB: 1950 DATE OF ADMISSION: 8/11/2017  1:22 PM   
DATE OF DISCHARGE: 8/16/2017 PRIMARY CARE PROVIDER: Valentino Dearth., MD  
 
ATTENDING PHYSICIAN: Paola Douglass MD 
DISCHARGING PROVIDER: Paola Douglass MD   
To contact this individual call [phone] and ask the  to page. If unavailable ask to be transferred the Adult Hospitalist Department. DISCHARGE DIAGNOSES Dehydration DVT 
 
CONSULTATIONS: IP CONSULT TO HOSPITALIST 
IP CONSULT TO ONCOLOGY 
IP CONSULT TO INFECTIOUS DISEASES PROCEDURES/SURGERIES: * No surgery found * PENDING TEST RESULTS:  
At the time of discharge the following test results are still pending: none FOLLOW UP APPOINTMENTS:  
Follow-up Information Follow up With Details Comments Contact Info Valentino Dearth., MD In 1 week  [phone] [place] [address] 
[phone] Aleks Mckeon MD In 1 week  [address] [place] [address] [phone] ADDITIONAL CARE RECOMMENDATIONS:  
Follow up with Dr Saul Harman in 1 week DIET: Cardiac Diet ACTIVITY: Activity as tolerated DISCHARGE MEDICATIONS: 
 See Medication Reconciliation Form · It is important that you take the medication exactly as they are prescribed. · Keep your medication in the bottles provided by the pharmacist and keep a list of the medication names, dosages, and times to be taken in your wallet. · Do not take other medications without consulting your doctor. NOTIFY YOUR PHYSICIAN FOR ANY OF THE FOLLOWING:  
Fever over 101 degrees for 24 hours. Chest pain, shortness of breath, fever, chills, nausea, vomiting, diarrhea, change in mentation, falling, weakness, bleeding. Severe pain or pain not relieved by medications. Or, any other signs or symptoms that you may have questions about. DISPOSITION: 
x  Home With: 
 OT  PT  [place]  RN  
  
 SNF/Inpatient Rehab/LTAC Independent/assisted living Hospice Other: CDMP Checked:  
Yes x PROBLEM LIST Updated: 
Yes x Signed:  
Melissa Maldonado MD 
8/16/2017 4:35 PM 
 
Discharge Orders None ACO Transitions of Care Introducing [facility] [employer] offers a voluntary care coordination program to provide high quality service and care to [facility] fee-for-service beneficiaries. Julianna Rios was designed to help you enhance your health and well-being through the following services: ? Transitions of Care  support for individuals who are transitioning from one care setting to another (example: Hospital to home). ? Chronic and Complex Care Coordination  support for individuals and caregivers of those with serious or chronic illnesses or with more than one chronic (ongoing) condition and those who take a number of different medications. If you meet specific medical criteria, a [facility] [address] may call you directly to coordinate your care with your primary care physician and your other care providers. For questions about the [facility] programs, please, contact your physicians office. For general questions or additional information about Accountable Care Organizations: 
Please visit www.medicare.gov/acos. html or call 1-800-MEDICARE (1-572.239.6814) TTY users should call 9-677.519.1801. [facility] [employer] Announcement We are excited to announce that we are making your provider's discharge notes available to you in [facility]. You will see these notes when they are completed and signed by the physician that discharged you from your recent hospital stay.   If you have any questions or concerns about any information you see in [facility], please call the [facility] Department where you were seen or reach out to your Primary Care Provider for more information about your plan of care. Introducing [facility] [place] [facility]! Dear Juan Florence: Thank you for requesting a [employer] account. Our records indicate that you already have an active [employer] account. You can access your account anytime at [email or web address] Did you know that you can access your hospital and ER discharge instructions at any time in [employer]? You can also review all of your test results from your hospital stay or ER visit. Additional Information If you have questions, please visit the Frequently Asked Questions section of the [employer] website at [email or web address] Remember, [employer] is NOT to be used for urgent needs. For medical emergencies, dial 911. Now available from your iPhone and Android! General Information Please provide this summary of care documentation to your next provider. Patient Signature:  ____________________________________________________________ Date:  ____________________________________________________________  
  
Alicia Fay Provider Signature:  ____________________________________________________________ Date:  ____________________________________________________________

## 2017-08-11 NOTE — PROGRESS NOTES
Admission Medication Reconciliation:    Information obtained from: Patient, RX Query    Significant PMH/Disease States:   Past Medical History:   Diagnosis Date    Anxiety and depression     Breast CA ([address] 2012    Left only but had a bilateral mastectomy/chemo    GERD (gastroesophageal reflux disease)     not an issure, no medications    Hypertension     Ill-defined condition     pinched nerve in back    Other ill-defined conditions     last chemo treatment 1/30/13    Sickle cell trait [facility])        Chief Complaint for this Admission:  Dizziness, Nausea, medication reaction? Allergies:  Codeine    Prior to Admission Medications:   Prior to Admission Medications   Prescriptions Last Dose Informant Patient Reported? Taking?   aspirin delayed-release 81 mg tablet 8/7/2017  Yes Yes   Sig: Take 81 mg by mouth daily. multivitamin (ONE A DAY) tablet 8/9/2017 at 0800  Yes No   Sig: Take 1 Tab by mouth daily (after breakfast). Takes when remembered. ondansetron (ZOFRAN ODT) 4 mg disintegrating tablet 8/10/2017 at 2230  No Yes   Sig: Take 1 Tab by mouth every six (6) hours as needed. oxyCODONE ER (OXYCONTIN) 10 mg ER tablet 8/11/2017 at 1000  No Yes   Sig: Take 1 Tab by mouth every twelve (12) hours. Max Daily Amount: 20 mg. Facility-Administered Medications: None         Comments/Recommendations:   Patient is a very pleasant lady who describes intermittent lightheadedness that progressed to \"full out vertigo\" over the course of the last few days to weeks, decided on this basis to stop taking HCTZ (\"felt so much worse after taking it\"). Gage Asencio felt so bad she could hardly get OOB. \" Patient states that her normal BP is ~080 (systolic), currently 48/67 on monitor in the ED. Allergies reviewed and confirmed as nausea / GI distress to Codeine only. Otherwise she takes aspirin about every other day, Oxycodone as prescribed following biopsy on 8/7, and nothing else. Deleted:  1.  HCTZ 25 mg every day  2. Percocet : stated she still has Percocetat home, cautioned her against taking these with any other narcotic prescription, patient verbalized understanding. Thank you for allowing me to participate in the care of this patient.

## 2017-08-11 NOTE — H&P
History & Physical    Primary Care Provider: Cayden Tesfaye MD  Source of Information: Patient,Records reviewed and summarized. Chief complaint:dizziness,weakness,fall    History of Presenting Illness:     Benito Puente is a 77 y.o. female who presented with dizziness. Patient was discharged from here on 8/10. She was admitted for back pain due to metastatic cancer. She has widely metastatic breast cancer. She felt dizzy and fell last night,did not hit head,no apparent injury. No fever,chills, cough,chest pain,nausea,vomiting,dysuria,urgency ,frequency. She thinks it could be the pain medications that makes her dizzy,but added this is not the first time she has had episodes of dizziness and weakness. She had brain MRI this recent admission which was negative. In the ED initial vital and signs were:  BP 86/57. Temp 98.1 HR 95  CXR: no acute abnormality  Labs: Na 129;creatinine 1.74;WBC 20. 1;HB 8.1    . CXR Results  (Last 48 hours)               08/11/17 1424  XR CHEST PORT Final result    Impression:  IMPRESSION: No evidence of acute cardiopulmonary process. Narrative:  INDICATION: Dizziness and nausea. Metastatic breast cancer. COMPARISON: March 31, 2016       FINDINGS: AP portable imaging of the chest performed at 2:20 PM demonstrates a   stable cardiomediastinal silhouette. The lungs are clear bilaterally. There is   unchanged dextroconvex curvature of the thoracic spine with associated   degenerative changes. Review of Systems:  A comprehensive review of systems was negative except for that written in the History of Present Illness.      Past Medical History:   Diagnosis Date    Anxiety and depression     Breast CA [address] [address] 2012    Left only but had a bilateral mastectomy/chemo    GERD (gastroesophageal reflux disease)     not an issure, no medications    Hypertension     Ill-defined condition     pinched nerve in back    Other ill-defined conditions     last chemo treatment 1/30/13    Sickle cell trait [facility])       Past Surgical History:   Procedure Laterality Date    ABDOMEN SURGERY PROC UNLISTED      hernia as child    BREAST SURGERY PROCEDURE UNLISTED  10/11/12    LEFT SLNB and port-a-cath insertion    BREAST SURGERY PROCEDURE UNLISTED      bilateral mastectomy with reconstruction- states not arm restricted    COLONOSCOPY N/A 5/10/2017    COLONOSCOPY performed by Maulik Riley MD at [address]  7/1/2013    BREAST TISSUE EXPANDER REMOVAL [facility] [address] HX LYMPHADENECTOMY  October 2012    sentinel node biopsy, negative    HX ORTHOPAEDIC Bilateral     ground down the bone d/t pressure on toes from shoes    HX VASCULAR ACCESS      insertion and removal    HX WISDOM TEETH EXTRACTION       Prior to Admission medications    Medication Sig Start Date End Date Taking? Authorizing Provider   ondansetron (ZOFRAN ODT) 4 mg disintegrating tablet Take 1 Tab by mouth every six (6) hours as needed. 8/9/17  Yes Carmina Zamarripa MD   oxyCODONE ER (OXYCONTIN) 10 mg ER tablet Take 1 Tab by mouth every twelve (12) hours. Max Daily Amount: 20 mg. 8/9/17  Yes Carmina Zamarripa MD   aspirin delayed-release 81 mg tablet Take 81 mg by mouth daily. Yes Historical Provider   multivitamin (ONE A DAY) tablet Take 1 Tab by mouth daily (after breakfast). Takes when remembered.     Historical Provider     Allergies   Allergen Reactions    Codeine Nausea Only      Family History   Problem Relation Age of Onset    Cancer Mother      colon cancer    Heart Disease Father     Diabetes Father     Cancer Sister 27     breast cancer    MS Sister     Cancer Other 36     breast cancer    Cancer Maternal Grandfather      MOUTH/THROAT    Cancer Paternal Grandmother      BRAIN    Sickle Cell Anemia Brother     Malignant Hyperthermia Neg Hx     Pseudocholinesterase Deficiency Neg Hx     Delayed Awakening Neg Hx     Post-op Nausea/Vomiting Neg Hx     Emergence Delirium Neg Hx     Post-op Cognitive Dysfunction Neg Hx     Other Neg Hx         SOCIAL HISTORY:  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 her sister lives with them and helps. Social History     Social History    Marital status:      Spouse name: N/A    Number of children: N/A    Years of education: N/A     Social History Main Topics    Smoking status: Never Smoker    Smokeless tobacco: Never Used    Alcohol use No    Drug use: Yes     Special: Marijuana      Comment: 7 MONTHS AGO    Sexual activity: Not Asked     Other Topics Concern    None     Social History Narrative         Objective:     Physical Exam:     Visit Vitals    /58    Pulse 81    Temp 98.1 °F (36.7 °C)    Resp 9    Ht 5' 7\" (1.702 m)    Wt 61.7 kg (136 lb)    SpO2 100%    BMI 21.3 kg/m2      O2 Device: Room air    General:  Alert, cooperative, no distress, appears stated age. Head:  Normocephalic, without obvious abnormality, atraumatic. Eyes:  Conjunctivae/corneas clear. PERRL, EOMs intact. Nose: Nares normal. Septum midline. Mucosa normal. No drainage or sinus tenderness. Throat: Lips, mucosa, and tongue normal. Teeth and gums normal.   Neck: Supple, symmetrical, trachea midline, no adenopathy, thyroid: no enlargement/tenderness/nodules, no carotid bruit and no JVD. Back:   Symmetric, no curvature. ROM normal. No CVA tenderness. Lungs:   Clear to auscultation bilaterally. Chest wall:  No tenderness or deformity. Heart:  Regular rate and rhythm, S1, S2 normal, no murmur, click, rub or gallop. Abdomen:   Soft, non-tender. Bowel sounds normal. No masses,  No organomegaly. Extremities: Extremities normal, atraumatic, no cyanosis or edema. Pulses: 2+ and symmetric all extremities.    Skin: Skin color, texture, turgor normal. No rashes or lesions   Neurologic: Alert and oriented. Non focal exam.            Data Review:     Recent Days:  Recent Labs      08/11/17   1344   WBC  20.1*   HGB  8.2*   HCT  25.3*   PLT  327     Recent Labs      08/11/17   1344   NA  129*   K  3.8   CL  91*   CO2  29   GLU  131*   BUN  15   CREA  1.74*   CA  8.9   ALB  2.4*   TBILI  1.6*   SGOT  169*   ALT  76     No results for input(s): PH, PCO2, PO2, HCO3, FIO2 in the last 72 hours. 24 Hour Results:  Recent Results (from the past 24 hour(s))   EKG, 12 LEAD, INITIAL    Collection Time: 08/11/17  1:21 PM   Result Value Ref Range    Ventricular Rate 90 BPM    Atrial Rate 90 BPM    P-R Interval 106 ms    QRS Duration 76 ms    Q-T Interval 380 ms    QTC Calculation (Bezet) 464 ms    Calculated P Axis 54 degrees    Calculated R Axis -3 degrees    Calculated T Axis 18 degrees    Diagnosis       Sinus rhythm with short IA  When compared with ECG of 02-APR-2013 10:01,  No significant change was found     CBC WITH AUTOMATED DIFF    Collection Time: 08/11/17  1:44 PM   Result Value Ref Range    WBC 20.1 (H) 3.6 - 11.0 K/uL    RBC 2.84 (L) 3.80 - 5.20 M/uL    HGB 8.2 (L) 11.5 - 16.0 g/dL    HCT 25.3 (L) 35.0 - 47.0 %    MCV 89.1 80.0 - 99.0 FL    MCH 28.9 26.0 - 34.0 PG    MCHC 32.4 30.0 - 36.5 g/dL    RDW 14.5 11.5 - 14.5 %    PLATELET 519 505 - 481 K/uL    NEUTROPHILS 82 (H) 32 - 75 %    LYMPHOCYTES 6 (L) 12 - 49 %    MONOCYTES 12 5 - 13 %    EOSINOPHILS 0 0 - 7 %    BASOPHILS 0 0 - 1 %    ABS. NEUTROPHILS 16.5 (H) 1.8 - 8.0 K/UL    ABS. LYMPHOCYTES 1.2 0.8 - 3.5 K/UL    ABS. MONOCYTES 2.4 (H) 0.0 - 1.0 K/UL    ABS. EOSINOPHILS 0.0 0.0 - 0.4 K/UL    ABS.  BASOPHILS 0.0 0.0 - 0.1 K/UL    DF SMEAR SCANNED      RBC COMMENTS ANISOCYTOSIS  1+        RBC COMMENTS POLYCHROMASIA  PRESENT       METABOLIC PANEL, COMPREHENSIVE    Collection Time: 08/11/17  1:44 PM   Result Value Ref Range    Sodium 129 (L) 136 - 145 mmol/L    Potassium 3.8 3.5 - 5.1 mmol/L    Chloride 91 (L) 97 - 108 mmol/L    CO2 29 21 - 32 mmol/L Anion gap 9 5 - 15 mmol/L    Glucose 131 (H) 65 - 100 mg/dL    BUN 15 6 - 20 MG/DL    Creatinine 1.74 (H) 0.55 - 1.02 MG/DL    BUN/Creatinine ratio 9 (L) 12 - 20      GFR est AA 35 (L) >60 ml/min/1.73m2    GFR est non-AA 29 (L) >60 ml/min/1.73m2    Calcium 8.9 8.5 - 10.1 MG/DL    Bilirubin, total 1.6 (H) 0.2 - 1.0 MG/DL    ALT (SGPT) 76 12 - 78 U/L    AST (SGOT) 169 (H) 15 - 37 U/L    Alk. phosphatase 416 (H) 45 - 117 U/L    Protein, total 8.3 (H) 6.4 - 8.2 g/dL    Albumin 2.4 (L) 3.5 - 5.0 g/dL    Globulin 5.9 (H) 2.0 - 4.0 g/dL    A-G Ratio 0.4 (L) 1.1 - 2.2     TROPONIN I    Collection Time: 08/11/17  1:44 PM   Result Value Ref Range    Troponin-I, Qt. 0.09 (H) <0.05 ng/mL   LACTIC ACID    Collection Time: 08/11/17  3:43 PM   Result Value Ref Range    Lactic acid 1.2 0.4 - 2.0 MMOL/L         Imaging:   [unfilled]    Assessment and plan   Orthostatic dizziness due to hypotension,dehydration and possible due to opioids  -Admit to medical floor  -IV hydration    VERNELL,prerenal  -Hydration. Monitor indices. No nephrotoxic. Hyponatremia due to above. expect to improve with fluid resuscitation     Breast ca with bone and liver mets,pain  -Pain control. Leukocytosis with out sign,symptom or lab/imaging evidence of infection save urin exam which is pending  -Likely due to stress from fall, dehydration  -Monitor. Withhold antibiotics for now. Slight troponin elevation  -No chest pain. EKG normal.Non specific in the setting of VERNELL  -     VTE prophylaxis:lovenox  Code Status:full    Signed By: Vanessa Cardenas MD     August 11, 2017

## 2017-08-11 NOTE — ED PROVIDER NOTES
HPI Comments: 77 y.o. female with past medical history significant for breast cancer, HTN, sickle-cell trait, anxiety and depression who presents by private vehicle from home accompanied by her sister with chief complaint of dizziness. Pt reports nausea, dizziness and difficulty ambulating secondary to those symptoms since starting oxycodone and percocet 2 days ago. Pt states she fell last night due to the symptoms and was unable to get up. Pt denies any of the aforementioned prior to starting these medications. Pt suspects she is dehydrated as she feels \"dry\". Pt reports ongoing right leg weakness and excessive sweating for the last several months. Pertinent negatives include headache, visual changes, new extremity weakness, vomiting, CP, SOB, fever and chills. There are no other acute medical concerns at this time. Old Chart Review:  Pt was admitted from 8/7/17-8/9/17 with likely metastatic breast cancer. MRI L spine on 7/29/17 indicated suspected widespread metastatic disease involving the spine and liver and pelvis. L3 vertebra appears largely replaced by tumor. Pt was discharged home with f/u in 1 week with Dr. Jacob Aggarwal MD, oncology. Pt was given prescriptions for percocet, oxycodone ER and Zofran. Head CT and MRI brain on 8/7/17 were both normal.     Social hx: denies tobacco use; denies EtOH use; denies illicit drug use  PCP: Hamzah Clark MD  Oncology: Jacob Aggarwal MD    Note written by Kirit Lovelace, as dictated by Mike Mejia MD 1:50 PM         The history is provided by the patient and medical records.         Past Medical History:   Diagnosis Date    Anxiety and depression     Breast CA ([facility] [address] 2012    Left only but had a bilateral mastectomy/chemo    GERD (gastroesophageal reflux disease)     not an issure, no medications    Hypertension     Ill-defined condition     pinched nerve in back    Other ill-defined conditions     last chemo treatment 1/30/13    Sickle cell trait ([facility] [address]        Past Surgical History:   Procedure Laterality Date    ABDOMEN SURGERY PROC UNLISTED      hernia as child    BREAST SURGERY PROCEDURE UNLISTED  10/11/12    LEFT SLNB and port-a-cath insertion    BREAST SURGERY PROCEDURE UNLISTED      bilateral mastectomy with reconstruction- states not arm restricted    COLONOSCOPY N/A 5/10/2017    COLONOSCOPY performed by Pippa Ashley MD at [address] HX BREAST RECONSTRUCTION  7/1/2013    BREAST TISSUE EXPANDER REMOVAL performed by Cata Domingo MD at [address] [place] [address] [address] HX LYMPHADENECTOMY  October 2012    sentinel node biopsy, negative    HX ORTHOPAEDIC Bilateral     ground down the bone d/t pressure on toes from shoes    HX VASCULAR ACCESS      insertion and removal    HX WISDOM TEETH EXTRACTION           Family History:   Problem Relation Age of Onset    Cancer Mother      colon cancer    Heart Disease Father     Diabetes Father     Cancer Sister 27     breast cancer    MS Sister     Cancer Other 36     breast cancer    Cancer Maternal Grandfather      MOUTH/THROAT    Cancer Paternal Grandmother      BRAIN    Sickle Cell Anemia Brother     Malignant Hyperthermia Neg Hx     Pseudocholinesterase Deficiency Neg Hx     Delayed Awakening Neg Hx     Post-op Nausea/Vomiting Neg Hx     Emergence Delirium Neg Hx     Post-op Cognitive Dysfunction Neg Hx     Other Neg Hx        Social History     Social History    Marital status:      Spouse name: N/A    Number of children: N/A    Years of education: N/A     Occupational History    Not on file.      Social History Main Topics    Smoking status: Never Smoker    Smokeless tobacco: Never Used    Alcohol use No    Drug use: Yes     Special: Marijuana      Comment: 7 MONTHS AGO    Sexual activity: Not on file     Other Topics Concern    Not on file     Social History Narrative         ALLERGIES: Codeine    Review of Systems Constitutional: Negative for chills, diaphoresis and fever. HENT: Negative for congestion, postnasal drip, rhinorrhea and sore throat. Eyes: Negative for photophobia, discharge, redness and visual disturbance. Respiratory: Negative for cough, chest tightness, shortness of breath and wheezing. Cardiovascular: Negative for chest pain, palpitations and leg swelling. Gastrointestinal: Positive for nausea. Negative for abdominal distention, abdominal pain, blood in stool, constipation, diarrhea and vomiting. Genitourinary: Negative for difficulty urinating, dysuria, frequency, hematuria and urgency. Musculoskeletal: Negative for arthralgias, back pain, joint swelling and myalgias. Skin: Negative for color change and rash. Neurological: Positive for dizziness. Negative for speech difficulty, weakness, light-headedness, numbness and headaches. Psychiatric/Behavioral: Negative for confusion. The patient is not nervous/anxious. All other systems reviewed and are negative. Vitals:    08/11/17 1315 08/11/17 1324   BP: (!) 86/57 (!) 85/56   Pulse: 95    Resp: 16    Temp: 98.1 °F (36.7 °C)    SpO2: 96%    Weight: 61.7 kg (136 lb)    Height: 5' 7\" (1.702 m)             Physical Exam   Constitutional: She is oriented to person, place, and time. She appears well-developed and well-nourished. No distress. HENT:   Head: Normocephalic and atraumatic. Right Ear: External ear normal.   Left Ear: External ear normal.   Nose: Nose normal.   Mouth/Throat: Oropharynx is clear and moist.   Eyes: Conjunctivae and EOM are normal. Pupils are equal, round, and reactive to light. No scleral icterus. Neck: Normal range of motion. Neck supple. No JVD present. No tracheal deviation present. No thyromegaly present. Cardiovascular: Normal rate, regular rhythm and normal heart sounds. Exam reveals no gallop and no friction rub. No murmur heard.   Pulmonary/Chest: Effort normal and breath sounds normal. No respiratory distress. She has no wheezes. She has no rales. She exhibits no tenderness. Abdominal: Soft. Bowel sounds are normal. She exhibits no distension and no mass. There is no tenderness. There is no rebound and no guarding. Musculoskeletal: Normal range of motion. She exhibits no edema or tenderness. Lymphadenopathy:     She has no cervical adenopathy. Neurological: She is alert and oriented to person, place, and time. She has normal strength. She displays no atrophy and no tremor. No cranial nerve deficit. She exhibits normal muscle tone. Coordination and gait normal.   Right leg slightly weaker than left. Appears to be chronic secondary to metastatic cancer. Skin: Skin is warm and dry. No rash noted. She is not diaphoretic. No erythema. Psychiatric: She has a normal mood and affect. Her behavior is normal. Judgment and thought content normal.   Nursing note and vitals reviewed. Note written by Kirit Acosta, as dictated by Adolfo Rosa MD 1:50 PM     MDM  Number of Diagnoses or Management Options  Breast cancer, stage 2, unspecified laterality [facility]):   Dehydration:   Dizziness:   Elevated troponin I level:   Falls, initial encounter:   Hyponatremia:   Metastatic disease ([facility] [address]):   Renal insufficiency:   Sepsis, due to unspecified organism [facility]):   Diagnosis management comments: [employer]  51-year-old female with history of metastatic breast cancer presents to the emergency department with 3 episodes of falling today feeling dizzy and nauseous. Her history is remarkable for recently started on OxyContin which may be contributing. Other than that she has no other complaints. Differential includes light red imbalance, dehydration, doubt this represents a myocardial ischemia dysrhythmia, consider infectious etiology. Anemia, doubt PE as no other sx    Plan of care with baseline labs, chest x-ray, urinalysis and will provide IV fluids and treat accordingly.     Critical Care  Total time providing critical care: (Total critical care time spend exclusive of procedures: 75 minutes  )    ED Course       Procedures    ED EKG interpretation:  Rhythm: sinus rhythm with short WV. Rate (approx.): 90; Axis: normal; ST/T wave: normal. No STEMI. Note written by Kirit Long, as dictated by Whitney Leach MD 1:25 PM    Chest x-ray: Impression: No evidence of acute cardiopulmonary process  WBC: 20.1  HGB: 8.2  Creatinine: 1.74  Troponin: 0.09    3:26 PM  Discussed available lab and imaging results with patient. She verbalizes understanding and agrees with care plan. Will admit patient for further evaluation and management. CONSULT NOTE:  3:49 Amanda Schmidt MD spoke with Dr. Kimberly Street, Consult for Oncology. Discussed available diagnostic tests and clinical findings. He is in agreement with care plans as outlined. Dr. Blank العراقي recommends proceeding with plan to admit patient through hospitalist service. CONSULT NOTE:  3:51 Amanda Schmidt MD spoke with Dr. Kennedy Hannah, Consult for Hospitalist.  Discussed available diagnostic tests and clinical findings. He is in agreement with care plans as outlined. Dr. Kennedy Hannah will evaluate and admit the patient. He recommends continuing with plan to give fluids and treat with abx.

## 2017-08-11 NOTE — ED TRIAGE NOTES
D/C from [facility] on 8/9/2017 with metastatic CA and prescribed OxyContin and Zofran. Complaining of dizziness and nausea. BP 86/57 in triage.

## 2017-08-12 NOTE — PROGRESS NOTES
Hospitalist Progress Note  Ilir Nunez MD  Office: [phone]  Cell: [phone]      Date of Service:  2017  NAME:  Jonah Cotter  :  1950  MRN:  [id number]      Admission Summary:   76 yo female admitted for fall and dizziness. Pt with PMhx of Breast Ca now being workup for bone/liver mets. Pt s/p Rt iliac bone bx 6 days ago. Interval history / Subjective:     Pt seen and examined  Feeling better  Overnight fever of 102  No cough, dysuria, n/v  Having LLQ pain and loose stool. No diarrhea. Assessment & Plan:     S/p Fall likely related to Orthostatic hypotension  - BP stable now   - in ER BP 86/57  - PT eval pending    Acute on Chronic Anemia  - no sign of GI bleed or other source  - 2U PRBCs ordered  - check stool for blood    Sepsis due to probable Diverticulitis  - c/o LLQ tenderness and RUQ tenderness  - will start Levaquin and Zosyn  - Abdominal sono   - will send stool for C. Diff if watery diarrhea occurs  - follow blood cultures    Hyponatremia  - likely related to Hypovolemia  - improving with IVF    VERNELL due to hypotension  - resolve with IVF    Hypokalemia   - replacement ordered    Hx of Breast Ca now likely bone and liver mets  - onc consulted  - s/p Rt Iliac bone bx on  positive for metastatic adenocarcinoma      Code status: FULL  DVT prophylaxis: Lovenox    Care Plan discussed with: Patient/Family and Nurse  Disposition: Home w/Family     Hospital Problems  Date Reviewed: 2017          Codes Class Noted POA    Hyponatremia ICD-10-CM: E87.1  ICD-9-CM: 276.1  2017 Unknown        Leukocytosis ICD-10-CM: D72.829  ICD-9-CM: 288.60  2017 Unknown        VERNELL (acute kidney injury) ([facility] [address] ICD-10-CM: N17.9  ICD-9-CM: 584.9  2017 Unknown        Metastatic breast cancer ([facility] [address] ICD-10-CM: C50.919, C79.9  ICD-9-CM: 174.9, 199.1  2017 Unknown                Review of Systems:   A comprehensive review of systems was negative except for that written in the HPI. Vital Signs:    Last 24hrs VS reviewed since prior progress note. Most recent are:  Visit Vitals    BP (P) 122/75    Pulse (P) 94    Temp (P) 98.6 °F (37 °C)    Resp (P) 18    Ht 5' 7\" (1.702 m)    Wt 61.7 kg (136 lb)    SpO2 (P) 99%    BMI 21.3 kg/m2       No intake or output data in the 24 hours ending 08/12/17 1122     Physical Examination:             Constitutional:  No acute distress, cooperative, pleasant    ENT:  Oral mucous moist   Resp:  CTA bilaterally. No wheezing/rhonchi/rales. CV:  Regular rhythm, normal rate,    GI:  Soft, non distended, +RUQ and LLQ tenderness, normoactive bowel sounds,     Musculoskeletal:  No edema, warm, 2+ pulses throughout    Neurologic:  Moves all extremities. AAOx3, CN II-XII reviewed     Skin:  Good turgor, no rashes or ulcers       Data Review:    Review and/or order of clinical lab test  Review and/or order of tests in the radiology section of CPT  Review and/or order of tests in the medicine section of CPT      Labs:     Recent Labs      08/12/17   0250  08/11/17   1344   WBC  15.0*  20.1*   HGB  6.4*  8.2*   HCT  20.3*  25.3*   PLT  284  327     Recent Labs      08/12/17   0250  08/11/17   1344   NA  132*  129*   K  3.4*  3.8   CL  98  91*   CO2  27  29   BUN  13  15   CREA  0.99  1.74*   GLU  206*  131*   CA  7.9*  8.9     Recent Labs      08/11/17   1344   SGOT  169*   ALT  76   AP  416*   TBILI  1.6*   TP  8.3*   ALB  2.4*   GLOB  5.9*     No results for input(s): INR, PTP, APTT in the last 72 hours. No lab exists for component: INREXT   No results for input(s): FE, TIBC, PSAT, FERR in the last 72 hours. Lab Results   Component Value Date/Time    Folate 13.0 08/08/2017 03:25 AM      No results for input(s): PH, PCO2, PO2 in the last 72 hours.   Recent Labs      08/12/17   0250  08/11/17   1344   CPK  332*   --    CKNDX  Cannot be calculated   --    TROIQ  0.10*  0.09*     Lab Results   Component Value Date/Time    Cholesterol, total 285 03/31/2016 12:00 AM    HDL Cholesterol 91 03/31/2016 12:00 AM    LDL, calculated 174 03/31/2016 12:00 AM    Triglyceride 98 03/31/2016 12:00 AM     Lab Results   Component Value Date/Time    Glucose (POC) 94 03/03/2017 04:12 PM    Glucose POC 94 06/21/2017 03:29 PM     Lab Results   Component Value Date/Time    Color DARK YELLOW 08/11/2017 01:44 PM    Appearance CLOUDY 08/11/2017 01:44 PM    Specific gravity 1.010 08/11/2017 01:44 PM    pH (UA) 6.0 08/11/2017 01:44 PM    Protein 30 08/11/2017 01:44 PM    Glucose NEGATIVE  08/11/2017 01:44 PM    Ketone NEGATIVE  08/11/2017 01:44 PM    Urobilinogen 2.0 08/11/2017 01:44 PM    Nitrites NEGATIVE  08/11/2017 01:44 PM    Leukocyte Esterase NEGATIVE  08/11/2017 01:44 PM    Epithelial cells FEW 08/11/2017 01:44 PM    Bacteria NEGATIVE  08/11/2017 01:44 PM    WBC 0-4 08/11/2017 01:44 PM    RBC 0-5 08/11/2017 01:44 PM         Medications Reviewed:     Current Facility-Administered Medications   Medication Dose Route Frequency    0.9% sodium chloride infusion 250 mL  250 mL IntraVENous PRN    0.9% sodium chloride infusion 250 mL  250 mL IntraVENous PRN    levoFLOXacin (LEVAQUIN) tablet 750 mg  750 mg Oral Q24H    metroNIDAZOLE (FLAGYL) tablet 500 mg  500 mg Oral TID    0.9% sodium chloride infusion  125 mL/hr IntraVENous CONTINUOUS    aspirin delayed-release tablet 81 mg  81 mg Oral DAILY    ondansetron (ZOFRAN ODT) tablet 4 mg  4 mg Oral Q6H PRN    oxyCODONE IR (ROXICODONE) tablet 5 mg  5 mg Oral Q4H PRN    enoxaparin (LOVENOX) injection 40 mg  40 mg SubCUTAneous Q24H    sodium chloride (NS) flush 5-10 mL  5-10 mL IntraVENous Q8H    sodium chloride (NS) flush 5-10 mL  5-10 mL IntraVENous PRN     ______________________________________________________________________  EXPECTED LENGTH OF STAY: - - -  ACTUAL LENGTH OF STAY:          1                 Alicia Jiang MD

## 2017-08-12 NOTE — PROGRESS NOTES
Orders received, chart reviewed. Patient with Hgb 6.4 and presently getting ready to start second unit of blood. Will f/u later as able.  Elif Blandon, PT

## 2017-08-12 NOTE — PROGRESS NOTES
Bedside and Verbal shift change report given to Rossana Ramírez RN (oncoming nurse) by Vsena Kelley RN (offgoing nurse). Report included the following information SBAR, Kardex, ED Summary, [facility] and Recent Results.

## 2017-08-12 NOTE — PROGRESS NOTES
Bedside and Verbal shift change report given to Jess (oncoming nurse) by Tavares Frederick (offgoing nurse). Report included the following information SBAR and Kardex.

## 2017-08-13 NOTE — PROGRESS NOTES
Bedside and Verbal shift change report given to Joycelyn Harrell RN (oncoming nurse) by Andrea Lazcano RN (offgoing nurse). Report included the following information SBAR, Kardex, Intake/Output and MAR.

## 2017-08-13 NOTE — PROGRESS NOTES
Bedside and Verbal shift change report given to Jess (oncoming nurse) by Nargis Goss (offgoing nurse). Report included the following information SBAR, Kardex and Intake/Output.

## 2017-08-13 NOTE — PROGRESS NOTES
Problem: Mobility Impaired (Adult and Pediatric)  Goal: *Acute Goals and Plan of Care (Insert Text)  Physical Therapy Goals  Initiated 8/13/2017  4. Patient will ambulate with modified independence for 300 feet with the least restrictive device within 7 day(s). 5. Patient will ascend/descend 14 stairs with 2 handrail(s) with modified independence within 7 day(s). \  PHYSICAL THERAPY EVALUATION  Patient: Yoana Singh (60 y.o. female)  Date: 8/13/2017  Primary Diagnosis: VERNELL (acute kidney injury) ([facility] [address]  Hyponatremia  Leukocytosis  Metastatic breast cancer [facility])        Precautions: Fall         ASSESSMENT :   Based on the objective data described below, the patient presents with mildly decreased independence with mobility compared to her baseline for bed, transfers and gait. She is limited most by pain and impaired balance this date. She does not typically use and type of DME at home, however is not comfortable ambulating without holding on to IV pole for assessment. Gait is slightly antalgic with increased right hip and pelvic pain. She states had increased pain with outpatient PT and doing HEP. Educated patient that she should not be experienced increased pain with HEP. Patient unable to recall exercises at this time. Returned to room and sitting EOB. Will continue to follow and progress towards acute care goals. Would like to trial ambulation with cane and practice steps before d/c. May benefit from continued outpatient PT at d/c. Patient will benefit from skilled intervention to address the above impairments.   Patients rehabilitation potential is considered to be Good  Factors which may influence rehabilitation potential include:   [ ]         None noted  [ ]         Mental ability/status  [X]         Medical condition  [ ]         Home/family situation and support systems  [ ]         Safety awareness  [ ]         Pain tolerance/management  [ ]         Other:        PLAN :  Recommendations and Planned Interventions:  [ ]           Bed Mobility Training             [ ]    Neuromuscular Re-Education  [ ]           Transfer Training                   [ ]    Orthotic/Prosthetic Training  [ ]           Gait Training                         [ ]    Modalities  [ ]           Therapeutic Exercises           [ ]    Edema Management/Control  [ ]           Therapeutic Activities            [ ]    Patient and Family Training/Education  [ ]           Other (comment):     Frequency/Duration: Patient will be followed by physical therapy  3 times a week to address goals. Discharge Recommendations: Outpatient  Further Equipment Recommendations for Discharge: cane? SUBJECTIVE:   Patient stated I would try a cane.       OBJECTIVE DATA SUMMARY:   HISTORY:    Past Medical History:   Diagnosis Date    Anxiety and depression      Breast CA ([facility] [address] 2012     Left only but had a bilateral mastectomy/chemo    GERD (gastroesophageal reflux disease)       not an issure, no medications    Hypertension      Ill-defined condition       pinched nerve in back    Other ill-defined conditions       last chemo treatment 1/30/13    Sickle cell trait ([facility] [address]       Past Surgical History:   Procedure Laterality Date    ABDOMEN SURGERY PROC UNLISTED         hernia as child    BREAST SURGERY PROCEDURE UNLISTED   10/11/12     LEFT SLNB and port-a-cath insertion    BREAST SURGERY PROCEDURE UNLISTED         bilateral mastectomy with reconstruction- states not arm restricted    COLONOSCOPY N/A 5/10/2017     COLONOSCOPY performed by Tiffany Spence MD at [address]   7/1/2013     BREAST TISSUE EXPANDER REMOVAL performed by Nuris Ruffin MD at [address] [facility] [address] HX LYMPHADENECTOMY   October 2012     sentinel node biopsy, negative    HX ORTHOPAEDIC Bilateral       ground down the bone d/t pressure on toes from shoes    HX VASCULAR ACCESS         insertion and removal  HX WISDOM TEETH EXTRACTION         Prior Level of Function/Home Situation: independent. Personal factors and/or comorbidities impacting plan of care:      Home Situation  Home Environment: Private residence  # Steps to Enter: 5  Rails to Enter: Yes  Hand Rails : Bilateral  One/Two Story Residence: Two story  # of Interior Steps: 15  Interior Rails: Right  Living Alone: No  Support Systems: Family member(s)  Patient Expects to be Discharged to[de-identified] Private residence  Current DME Used/Available at Home: None     EXAMINATION/PRESENTATION/DECISION MAKING:   Critical Behavior:  Neurologic State: Alert  Orientation Level: Oriented X4  Cognition: Appropriate decision making, Appropriate for age attention/concentration, Appropriate safety awareness, Follows commands     Hearing: Auditory  Auditory Impairment: None     Range Of Motion:  AROM: Generally decreased, functional                       Strength:    Strength: Generally decreased, functional                    Tone & Sensation:   Tone: Normal              Sensation: Intact               Coordination:  Coordination: Within functional limits  Vision:      Functional Mobility:  Bed Mobility:              Transfers:  Sit to Stand: Supervision  Stand to Sit: Supervision                       Balance:   Sitting: Intact  Standing: Intact; With support  Ambulation/Gait Training:  Distance (ft): 100 Feet (ft)  Assistive Device: Gait belt (IV pole)  Ambulation - Level of Assistance: Supervision     Gait Description (WDL): Exceptions to WDL  Gait Abnormalities: Antalgic;Decreased step clearance           Stance: Right decreased  Speed/Pascale: Slow  Step Length: Right shortened;Left shortened  Swing Pattern: Right asymmetrical           Functional Measure:  Tinetti test:      Sitting Balance: 1  Arises: 1  Attempts to Rise: 2  Immediate Standing Balance: 1  Standing Balance: 1  Nudged: 1  Eyes Closed: 1  Turn 360 Degrees - Continuous/Discontinuous: 1  Turn 360 Degrees - Steady/Unsteady: 1  Sitting Down: 2  Balance Score: 12  Indication of Gait: 1  R Step Length/Height: 1  L Step Length/Height: 1  R Foot Clearance: 1  L Foot Clearance: 1  Step Symmetry: 1  Step Continuity: 1  Path: 1  Trunk: 0  Walking Time: 1  Gait Score: 9  Total Score: 21         Tinetti Test and G-code impairment scale:  Percentage of Impairment CH     0%    CI     1-19% CJ     20-39% CK     40-59% CL     60-79% CM     80-99% CN      100%   Tinetti  Score 0-28 28 23-27 17-22 12-16 6-11 1-5 0          Tinetti Tool Score Risk of Falls  <19 = High Fall Risk  19-24 = Moderate Fall Risk  25-28 = Low Fall Risk  Tinetti ME. Performance-Oriented Assessment of Mobility Problems in Elderly Patients. Rodriguez 66; [id number]. (Scoring Description: PT Bulletin Feb. 10, 1993)     Older adults: Nikki Arias et al, 2009; n = 1000 [facility] elderly evaluated with ABC, HUMBLE, ADL, and IADL)  · Mean HUMBLE score for males aged 69-68 years = 26.21(3.40)  · Mean HUMBLE score for females age 69-68 years = 25.16(4.30)  · Mean HUMBLE score for males over 80 years = 23.29(6.02)  · Mean HUMBLE score for females over 80 years = 17.20(8.32)            G codes: In compliance with CMSs Claims Based Outcome Reporting, the following G-code set was chosen for this patient based on their primary functional limitation being treated: The outcome measure chosen to determine the severity of the functional limitation was the Tinetti with a score of 21/28 which was correlated with the impairment scale.       · Mobility - Walking and Moving Around:               - CURRENT STATUS:    CJ - 20%-39% impaired, limited or restricted               - GOAL STATUS:           CI - 1%-19% impaired, limited or restricted               - D/C STATUS:                       ---------------To be determined---------------      Physical Therapy Evaluation Charge Determination   History Examination Presentation Decision-Making   HIGH Complexity :3+ comorbidities / personal factors will impact the outcome/ POC  LOW Complexity : 1-2 Standardized tests and measures addressing body structure, function, activity limitation and / or participation in recreation  LOW Complexity : Stable, uncomplicated  Other outcome measures Tinetti  MEDIUM      Based on the above components, the patient evaluation is determined to be of the following complexity level: LOW      Pain:  Pain Scale 1: Visual  Pain Intensity 1: 0              Activity Tolerance:   Limited by pain  Please refer to the flowsheet for vital signs taken during this treatment. After treatment:   [X]         Patient left in no apparent distress sitting up in chair  [ ]         Patient left in no apparent distress in bed  [X]         Call bell left within reach  [X]         Nursing notified  [X]         Caregiver present  [ ]         Bed alarm activated      COMMUNICATION/EDUCATION:   The patients plan of care was discussed with: Registered Nurse.  [X]         Fall prevention education was provided and the patient/caregiver indicated understanding. [X]         Patient/family have participated as able in goal setting and plan of care. [X]         Patient/family agree to work toward stated goals and plan of care. [ ]         Patient understands intent and goals of therapy, but is neutral about his/her participation. [ ]         Patient is unable to participate in goal setting and plan of care.      Thank you for this referral.  Chapis Schofield, PT , DPT   Time Calculation: 15 mins

## 2017-08-13 NOTE — PROGRESS NOTES
Hospitalist Progress Note  Ricky Renteria MD  Office: [phone]  Cell: [phone]      Date of Service:  2017  NAME:  Melia Leon  :  1950  MRN:  [id number]      Admission Summary:   78 yo female admitted for fall and dizziness. Pt with PMhx of Breast Ca now being workup for bone/liver mets. Pt s/p Rt iliac bone bx 6 days ago.      Interval history / Subjective:     Pt seen and examined  Breaking out in sweats this AM. Pt tells me she had been having night sweats for past 3 months  Still complaining of diffuse abdominal pain      Assessment & Plan:     S/p Fall likely related to Orthostatic hypotension  - BP stable now   - in ER BP 86/57  - PT eval pending    Acute on Chronic Anemia  - no sign of GI bleed or other source  - 2U PRBCs   - occult blood stool negative    SIRS with possible sepsis, no source at this time  - c/o LLQ tenderness and RUQ tenderness  - will start Zosyn, c/w Levaquin, d/c Flagyl  - Abdominal sono : biliary sludge and echogenic enlarged liver  - Blood cx: NGTD    Hyponatremia  - likely related to Hypovolemia  - improving with IVF    VERNELL due to hypotension  - resolve with IVF    Hypokalemia   - resolved     Hx of Breast Ca now likely bone and liver mets  - onc consulted  - s/p Rt Iliac bone bx on  positive for metastatic adenocarcinoma      Code status: FULL  DVT prophylaxis: Lovenox    Care Plan discussed with: Patient/Family and Nurse  Disposition: Home w/Family     Hospital Problems  Date Reviewed: 2017          Codes Class Noted POA    Hyponatremia ICD-10-CM: E87.1  ICD-9-CM: 276.1  2017 Unknown        Leukocytosis ICD-10-CM: U73.622  ICD-9-CM: 288.60  2017 Unknown        VERNELL (acute kidney injury) ([facility] [address] ICD-10-CM: N17.9  ICD-9-CM: 584.9  2017 Unknown        Metastatic breast cancer ([facility] [address] ICD-10-CM: C50.919, C79.9  ICD-9-CM: 174.9, 199.1  2017 Unknown                Review of Systems:   A comprehensive review of systems was negative except for that written in the HPI. Vital Signs:    Last 24hrs VS reviewed since prior progress note. Most recent are:  Visit Vitals    /86 (BP 1 Location: Right arm, BP Patient Position: At rest)    Pulse 99    Temp 100.4 °F (38 °C)    Resp 18    Ht 5' 7\" (1.702 m)    Wt 61.7 kg (136 lb)    SpO2 99%    BMI 21.3 kg/m2       No intake or output data in the 24 hours ending 08/13/17 1112     Physical Examination:             Constitutional:  No acute distress, cooperative, pleasant    ENT:  Oral mucous moist   Resp:  CTA bilaterally. No wheezing/rhonchi/rales. CV:  Regular rhythm, normal rate,    GI:  Soft, non distended, +diffusely tender, normoactive bowel sounds,     Musculoskeletal:  No edema, warm, 2+ pulses throughout    Neurologic:  Moves all extremities. AAOx3, CN II-XII reviewed     Skin:  Good turgor, no rashes or ulcers       Data Review:    Review and/or order of clinical lab test  Review and/or order of tests in the radiology section of CPT  Review and/or order of tests in the medicine section of CPT      Labs:     Recent Labs      08/13/17   0318  08/12/17   0250   WBC  19.5*  15.0*   HGB  10.6*  6.4*   HCT  31.9*  20.3*   PLT  328  284     Recent Labs      08/13/17   0318  08/12/17   0250  08/11/17   1344   NA  134*  132*  129*   K  3.6  3.4*  3.8   CL  97  98  91*   CO2  27  27  29   BUN  8  13  15   CREA  0.83  0.99  1.74*   GLU  97  206*  131*   CA  9.3  7.9*  8.9     Recent Labs      08/11/17   1344   SGOT  169*   ALT  76   AP  416*   TBILI  1.6*   TP  8.3*   ALB  2.4*   GLOB  5.9*     No results for input(s): INR, PTP, APTT in the last 72 hours. No lab exists for component: INREXT, INREXT   No results for input(s): FE, TIBC, PSAT, FERR in the last 72 hours. Lab Results   Component Value Date/Time    Folate 13.0 08/08/2017 03:25 AM      No results for input(s): PH, PCO2, PO2 in the last 72 hours.   Recent Labs 08/12/17   0250  08/11/17   1344   CPK  332*   --    CKNDX  Cannot be calculated   --    TROIQ  0.10*  0.09*     Lab Results   Component Value Date/Time    Cholesterol, total 285 03/31/2016 12:00 AM    HDL Cholesterol 91 03/31/2016 12:00 AM    LDL, calculated 174 03/31/2016 12:00 AM    Triglyceride 98 03/31/2016 12:00 AM     Lab Results   Component Value Date/Time    Glucose (POC) 94 03/03/2017 04:12 PM    Glucose POC 94 06/21/2017 03:29 PM     Lab Results   Component Value Date/Time    Color DARK YELLOW 08/11/2017 01:44 PM    Appearance CLOUDY 08/11/2017 01:44 PM    Specific gravity 1.010 08/11/2017 01:44 PM    pH (UA) 6.0 08/11/2017 01:44 PM    Protein 30 08/11/2017 01:44 PM    Glucose NEGATIVE  08/11/2017 01:44 PM    Ketone NEGATIVE  08/11/2017 01:44 PM    Urobilinogen 2.0 08/11/2017 01:44 PM    Nitrites NEGATIVE  08/11/2017 01:44 PM    Leukocyte Esterase NEGATIVE  08/11/2017 01:44 PM    Epithelial cells FEW 08/11/2017 01:44 PM    Bacteria NEGATIVE  08/11/2017 01:44 PM    WBC 0-4 08/11/2017 01:44 PM    RBC 0-5 08/11/2017 01:44 PM         Medications Reviewed:     Current Facility-Administered Medications   Medication Dose Route Frequency    piperacillin-tazobactam (ZOSYN) 3.375 g in 0.9% sodium chloride (MBP/ADV) 100 mL  3.375 g IntraVENous ONCE    Followed by    piperacillin-tazobactam (ZOSYN) 3.375 g in 0.9% sodium chloride (MBP/ADV) 100 mL  3.375 g IntraVENous ONCE    Followed by    piperacillin-tazobactam (ZOSYN) 3.375 g in 0.9% sodium chloride (MBP/ADV) 100 mL  3.375 g IntraVENous Q8H    sodium chloride (NS) flush 10 mL  10 mL IntraVENous RAD ONCE    iopamidol (ISOVUE-370) 76 % injection 100 mL  100 mL IntraVENous RAD ONCE    sodium chloride 0.9 % bolus infusion 100 mL  100 mL IntraVENous RAD ONCE    iohexol (OMNIPAQUE) solution 50 mL  50 mL Oral RAD ONCE    0.9% sodium chloride infusion 250 mL  250 mL IntraVENous PRN    0.9% sodium chloride infusion 250 mL  250 mL IntraVENous PRN    acetaminophen (TYLENOL) tablet 650 mg  650 mg Oral Q6H PRN    oxyCODONE IR (ROXICODONE) tablet 5 mg  5 mg Oral Q4H PRN    aspirin delayed-release tablet 81 mg  81 mg Oral DAILY    ondansetron (ZOFRAN ODT) tablet 4 mg  4 mg Oral Q6H PRN    enoxaparin (LOVENOX) injection 40 mg  40 mg SubCUTAneous Q24H    sodium chloride (NS) flush 5-10 mL  5-10 mL IntraVENous Q8H    sodium chloride (NS) flush 5-10 mL  5-10 mL IntraVENous PRN     ______________________________________________________________________  EXPECTED LENGTH OF STAY: - - -  ACTUAL LENGTH OF STAY:          2                 Amy Berrios MD

## 2017-08-13 NOTE — PROGRESS NOTES
Bedside and Verbal shift change report given to Waldo Velarde RN (oncoming nurse) by Emma Munguia RN (offgoing nurse). Report included the following information SBAR, Kardex, Procedure Summary and MAR.

## 2017-08-14 NOTE — PROGRESS NOTES
-Hematology / Oncology ([facility]) -  -CC- metastatic dz, hx breast cancer     Readmitted with orthostatic hypotension and fall, s/p transfusion    -S-  feeling much better. No new complaints     -O-    Patient Vitals for the past 24 hrs:   Temp Pulse Resp BP SpO2   08/14/17 1427 98.3 °F (36.8 °C) 91 18 128/77 99 %   08/14/17 0819 98.1 °F (36.7 °C) 87 16 127/80 99 %   08/14/17 0121 99.3 °F (37.4 °C) (!) 101 18 143/79 99 %   08/13/17 2004 98.1 °F (36.7 °C) 83 18 121/80 100 %     08/14 0701 - 08/14 1900  In: 3888 [P.O.:240; I.V.:855]  Out: -   Gen: nad  Chest: bilateral breath sounds present  Cardiac: rrr  Abd: s/nt    -Labs-    Recent Labs      08/14/17   0218  08/13/17   0318  08/12/17   0250   WBC  16.8*  19.5*  15.0*   HGB  9.1*  10.6*  6.4*   PLT  310  328  284   ANEU  14.3*  16.1*  12.6*   NA  136  134*  132*   K  3.6  3.6  3.4*   GLU  100  97  206*   BUN  7  8  13   CREA  0.70  0.83  0.99   ALT  34   --    --    SGOT  43*   --    --    TBILI  1.3*   --    --    AP  377*   --    --    CA  8.6  9.3  7.9*       8/7: UA- few epi  8/8: b12: 723, fol 13, iron 23, tibc 158, sharda 673  -- ca 15-3: 2194; ca 29-29: 3843     -Imaging-   7/29/17- MRI L spine - Suspect widespread metastatic disease involving the spine and liver and pelvis. L3 vertebra appears largely replaced by tumor.  Images personally reviewed.      8/7/17- Head CT w/o contrast- normal   8/7/17- MRI brain- Essentially normal   8/7/17: CT c-a-p (w):   1. 2.6 cm x 2.8 cm right lower lobe lesion consistent with metastatic disease. 8 mm lingular nodule. 2. Extensive hepatic metastatic disease. 3. Osseous metastatic disease, as described above. 4. Bilateral adrenal lesions, indeterminate, but worrisome for metastatic deposit. 5. Trace fluid in the pelvis     8/8- bone scan- IMPRESSION: Multiple foci of abnormal tracer activity in the skeleton,  corresponding to lytic bone lesions seen on CT.  These findings are compatible  with osseous metastatic disease.     -Assessment + Plan-     *) breast cancer- recurrent triple negative breast cancer recently dx per pelvis bx 8/7/17  ---- additionally, involvement of lung, liver (multifocal), possible adrenal (B)  ---- Discussed possible options such as single-agent xeloda vs combination tx. Also discussed anti-resorption agents for bone dz. Made plans for her to see me after discharge for arrangement of therapy.   *) anemia- responded to 2 units prbc 8/12 for hgb 6.4

## 2017-08-14 NOTE — PROGRESS NOTES
Phoned Dr. Dionicio Nunez to let him know the peripheral venous testing indicated vein thrombosis in the right leg.

## 2017-08-14 NOTE — PROCEDURES
[facility]  *** FINAL REPORT ***    Name: Vianey Zuniga  MRN: [id number]    Inpatient  : 08 Sep 1950  HIS Order #: [id number]  [address] [facility] Visit #: [id number]  Date: 14 Aug 2017    TYPE OF TEST: Peripheral Venous Testing    REASON FOR TEST  Pain in limb, Limb swelling, DVT suspected    Right Leg:-  Deep venous thrombosis:           Yes  Proximal extent of thrombus:      Popliteal Above The Knee  Superficial venous thrombosis:    No  Deep venous insufficiency:        Not examined  Superficial venous insufficiency: Not examined      INTERPRETATION/FINDINGS  PROCEDURE:  Color duplex ultrasound imaging of lower extremity veins. FINDINGS:       Right: Consistent with thrombosis involving the popliteal,  gastrocnemius, posterior tibial, peronial, and soleal veins as  demonstrated by vein non-compressibility, and by a narrowing or  occlusion of the flow channel on color Doppler imaging. The common  femoral, deep femoral, femoral, and great saphenous are patent and  without evidence of thrombus; each is is fully compressible and there  is no narrowing of the flow channel on color Doppler imaging. Phasic  flow is observed in the common femoral vein. Left:   The common femoral vein is patent and without evidence of   thrombus. Phasic flow is observed. This extremity was not otherwise   evaluated. IMPRESSION:  There is evidence of vein thrombosis, as described above. The ultrasound appearance is more consistent with an acute than a  chronic process. ADDITIONAL COMMENTS    I have personally reviewed the data relevant to the interpretation of  this  study.     TECHNOLOGIST: Luis Frankel, RVT  Signed: 2017 04:36 PM    PHYSICIAN: Ilsa Morales MD  Signed: 2017 04:42 PM

## 2017-08-14 NOTE — PROGRESS NOTES
Bedside shift change report given to Sarah Escobar RN (oncoming nurse) by Colby Falcon RN (offgoing nurse). Report included the following information SBAR and Kardex.

## 2017-08-14 NOTE — PROGRESS NOTES
Spiritual Care Partner Volunteer visited patient in Oncology on 8/14/2017. Documented by:  ELAINE Mcgill

## 2017-08-14 NOTE — PROGRESS NOTES
Hospitalist Progress Note        Date of Service:  2017  NAME:  Eric Stuart  :  1950  MRN:  [id number]      Admission Summary:   76 yo female admitted for fall and dizziness. Pt with PMhx of Breast Ca now being workup for bone/liver mets. Pt s/p Rt iliac bone bx 6 days ago. Interval history / Subjective:   Feels well today. Denies pain, dyspnea, n/v/d, dizziness. Worked with PT yesterday. Noted her RLE is edematous, she states this is chronic but not sure for how long.      Assessment & Plan:     S/p Fall likely related to Orthostatic hypotension  - resolved  - in ER BP 86/57  - PT rec outpt therapy    Acute on Chronic Anemia  - no sign of GI bleed or other source  - 2U PRBCs   - occult blood stool negative    SIRS with possible sepsis, no source at this time  - c/o LLQ tenderness and RUQ tenderness  - started Zosyn , Levaquin, Flagyl stopped   - if cx negative tomorrow will d/c abx  - Abdominal sono : biliary sludge and echogenic enlarged liver; CT abd w/ fluid around gallbladder likely ascites  - Blood cx: NGTD    Asymmetric LE edema: +RLE edema  - check RLE doppler    Hyponatremia  - likely related to Hypovolemia  - resolved with IVF    VERNELL due to hypovolemia  - resolved with IVF    Hypokalemia   - resolved     Hx of Breast Ca now likely bone and liver mets  - onc consulted  - s/p Rt Iliac bone bx on  positive for metastatic adenocarcinoma      Code status: FULL  DVT prophylaxis: Lovenox    Care Plan discussed with: Patient/Family and Nurse  Disposition: Home w/Family     Hospital Problems  Date Reviewed: 2017          Codes Class Noted POA    Hyponatremia ICD-10-CM: E87.1  ICD-9-CM: 276.1  2017 Unknown        Leukocytosis ICD-10-CM: D72.829  ICD-9-CM: 288.60  2017 Unknown        * (Principal)VERNELL (acute kidney injury) ([facility] [address] ICD-10-CM: N17.9  ICD-9-CM: 584.9  2017 Unknown        Metastatic breast cancer [facility]) ICD-10-CM: C50.919, C79.9  ICD-9-CM: 174.9, 199.1  8/11/2017 Unknown                Review of Systems:   A comprehensive review of systems was negative except for that written in the HPI. Vital Signs:    Last 24hrs VS reviewed since prior progress note. Most recent are:  Visit Vitals    /80 (BP 1 Location: Right arm, BP Patient Position: At rest)    Pulse 87    Temp 98.1 °F (36.7 °C)    Resp 16    Ht 5' 7\" (1.702 m)    Wt 61.7 kg (136 lb)    SpO2 99%    BMI 21.3 kg/m2         Intake/Output Summary (Last 24 hours) at 08/14/17 0859  Last data filed at 08/14/17 0400   Gross per 24 hour   Intake                0 ml   Output              325 ml   Net             -325 ml        Physical Examination:             Constitutional:  No acute distress, cooperative, pleasant    ENT:  anicteric sclerae, normal conjunctiva   Resp:  CTA bilaterally. No wheezing/rhonchi/rales. CV:  Regular rhythm, normal rate, no MRG, no JVD    GI:  Soft, non distended, +diffusely tender, normoactive bowel sounds,     Musculoskeletal:  1+ RLE edema, warm, 2+ pulses throughout    Neurologic:  Moves all extremities. AAOx3, CN II-XII reviewed, normal speech.  Takes longer than normal to recall details and answer questions     Skin:  Good turgor, no rashes or ulcers       Data Review:    Review and/or order of clinical lab test  Review and/or order of tests in the radiology section of CPT  Review and/or order of tests in the medicine section of CPT      Labs:     Recent Labs      08/14/17 0218 08/13/17   0318   WBC  16.8*  19.5*   HGB  9.1*  10.6*   HCT  27.6*  31.9*   PLT  310  328     Recent Labs      08/14/17 0218 08/13/17   0318  08/12/17   0250   NA  136  134*  132*   K  3.6  3.6  3.4*   CL  99  97  98   CO2  27  27  27   BUN  7  8  13   CREA  0.70  0.83  0.99   GLU  100  97  206*   CA  8.6  9.3  7.9*     Recent Labs      08/14/17 0218  08/11/17   1344   SGOT  43*  169*   ALT  34  76   AP  377*  416* TBILI  1.3*  1.6*   TP  6.8  8.3*   ALB  1.8*  2.4*   GLOB  5.0*  5.9*     No results for input(s): INR, PTP, APTT in the last 72 hours. No lab exists for component: INREXT, INREXT   No results for input(s): FE, TIBC, PSAT, FERR in the last 72 hours. Lab Results   Component Value Date/Time    Folate 13.0 08/08/2017 03:25 AM      No results for input(s): PH, PCO2, PO2 in the last 72 hours.   Recent Labs      08/12/17   0250  08/11/17   1344   CPK  332*   --    CKNDX  Cannot be calculated   --    TROIQ  0.10*  0.09*     Lab Results   Component Value Date/Time    Cholesterol, total 285 03/31/2016 12:00 AM    HDL Cholesterol 91 03/31/2016 12:00 AM    LDL, calculated 174 03/31/2016 12:00 AM    Triglyceride 98 03/31/2016 12:00 AM     Lab Results   Component Value Date/Time    Glucose (POC) 94 03/03/2017 04:12 PM    Glucose POC 94 06/21/2017 03:29 PM     Lab Results   Component Value Date/Time    Color DARK YELLOW 08/11/2017 01:44 PM    Appearance CLOUDY 08/11/2017 01:44 PM    Specific gravity 1.010 08/11/2017 01:44 PM    pH (UA) 6.0 08/11/2017 01:44 PM    Protein 30 08/11/2017 01:44 PM    Glucose NEGATIVE  08/11/2017 01:44 PM    Ketone NEGATIVE  08/11/2017 01:44 PM    Urobilinogen 2.0 08/11/2017 01:44 PM    Nitrites NEGATIVE  08/11/2017 01:44 PM    Leukocyte Esterase NEGATIVE  08/11/2017 01:44 PM    Epithelial cells FEW 08/11/2017 01:44 PM    Bacteria NEGATIVE  08/11/2017 01:44 PM    WBC 0-4 08/11/2017 01:44 PM    RBC 0-5 08/11/2017 01:44 PM         Medications Reviewed:     Current Facility-Administered Medications   Medication Dose Route Frequency    piperacillin-tazobactam (ZOSYN) 3.375 g in 0.9% sodium chloride (MBP/ADV) 100 mL  3.375 g IntraVENous Q8H    levoFLOXacin (LEVAQUIN) tablet 750 mg  750 mg Oral Q24H    0.9% sodium chloride infusion 250 mL  250 mL IntraVENous PRN    0.9% sodium chloride infusion 250 mL  250 mL IntraVENous PRN    acetaminophen (TYLENOL) tablet 650 mg  650 mg Oral Q6H PRN    oxyCODONE IR (ROXICODONE) tablet 5 mg  5 mg Oral Q4H PRN    aspirin delayed-release tablet 81 mg  81 mg Oral DAILY    ondansetron (ZOFRAN ODT) tablet 4 mg  4 mg Oral Q6H PRN    enoxaparin (LOVENOX) injection 40 mg  40 mg SubCUTAneous Q24H    sodium chloride (NS) flush 5-10 mL  5-10 mL IntraVENous Q8H    sodium chloride (NS) flush 5-10 mL  5-10 mL IntraVENous PRN     ______________________________________________________________________  EXPECTED LENGTH OF STAY: - - -  ACTUAL LENGTH OF STAY:          3                 Elizabeth Zuñiga MD

## 2017-08-15 NOTE — PROGRESS NOTES
Patient seen and examined    IMPRESSION    1. Leukocytosis     2. Acute right lower extremity DVT    3. Metastatic breast CA     4. Sickle cell trait    PLAN    1. PE is only remarkable for swelling and warmth of the right leg which is caused by the DVT. Thromboembolism itself can cause leukocytosis and perhaps this is the reason for the elevated WBC. Will get tag wbc scan tomorrow. 2. Discontinue levaquin.

## 2017-08-15 NOTE — PROGRESS NOTES
Care Management Interventions  PCP Verified by CM: Yes Sadia Tam MD, last office visit was two weeks ago)  Palliative Care Consult (Criteria: CHF and RRAT>21): No  Reason for No Palliative Care Consult: Other (see comment) (20)  Mode of Transport at Discharge: Other (see comment) (sister)  Discharge Durable Medical Equipment: No  Physical Therapy Consult: Yes  Occupational Therapy Consult: No  Current Support Network: Relative's Home (lives with her sister, Rachid Hale, [phone])  Confirm Follow Up Transport:  (sister)  Plan discussed with Pt/Family/Caregiver: Yes  Freedom of Choice Offered: Yes  Discharge Location  Discharge Placement: Home     Chart reviewed. Met with pt to introduce self and role. Pt is , self care, and retired. She has Medicare A & B insurance. Her sister provides transportation and is the POA to make decisions for pt if she were not able to do so. Pt uses [address] on [address] and [address] on [facility]. She does not use DME and has not used home health or home O2. She has not been in SNF or rehab facility. Her education level is high school. CM will follow for any discharge needs.     Verner Rakes, RN ACM CRM

## 2017-08-15 NOTE — PROGRESS NOTES
Bedside and Verbal shift change report given to [facility] RN (oncoming nurse) by Helene Perry (offgoing nurse). Report included the following information SBAR, Kardex, Intake/Output, MAR, Recent Results and Med Rec Status.

## 2017-08-15 NOTE — PROGRESS NOTES
Problem: Mobility Impaired (Adult and Pediatric)  Goal: *Acute Goals and Plan of Care (Insert Text)  Physical Therapy Goals  Initiated 8/13/2017  4. Patient will ambulate with modified independence for 300 feet with the least restrictive device within 7 day(s). 5. Patient will ascend/descend 14 stairs with 2 handrail(s) with modified independence within 7 day(s). \   PHYSICAL THERAPY TREATMENT  Patient: Benito Puente (17 y.o. female)  Date: 8/15/2017  Diagnosis: VERNELL (acute kidney injury) ([facility] [address]  Hyponatremia  Leukocytosis  Metastatic breast cancer ([facility] [address] VERNELL (acute kidney injury) ([facility] [address]       Precautions:        ASSESSMENT:  Patient received in bed and moving well today although slowly. Introduced the single point cane and educated on use. Did well with gait and improved with gait pattern with increased distance. Patient indicated that felt more steady with cane. Reviewed how to manage steps with cane. Will follow up next session with stairs. Safe for discharge home with family. Will obtain single point cane prior to discharge. Recommend up in halls several times daily with supervision. Progression toward goals:  [ ]    Improving appropriately and progressing toward goals  [ ]    Improving slowly and progressing toward goals  [ ]    Not making progress toward goals and plan of care will be adjusted       PLAN:  Patient continues to benefit from skilled intervention to address the above impairments. Continue treatment per established plan of care. Discharge Recommendations:  Outpatient  Further Equipment Recommendations for Discharge:  straight cane       SUBJECTIVE:   Patient stated I do feel more stable with the cane.       OBJECTIVE DATA SUMMARY:   Critical Behavior:  Neurologic State: Alert  Orientation Level: Oriented X4  Cognition: Appropriate for age attention/concentration     Functional Mobility Training:  Bed Mobility:   modified independent                 Transfers:  Sit to Stand: Modified independent  Stand to Sit: Modified independent                             Balance:  Sitting: Intact  Standing: Intact; With support  Ambulation/Gait Training:  Distance (ft): 250 Feet (ft)  Assistive Device: Cane, straight;Gait belt  Ambulation - Level of Assistance: Supervision        Gait Abnormalities: Antalgic           Stance: Right decreased  Speed/Pascale: Slow  Step Length: Right shortened;Left shortened  Swing Pattern: Right asymmetrical      Pain:  Pain Scale 1: Numeric (0 - 10)  Pain Intensity 1: 5  Pain Location 1: Leg  Pain Orientation 1: Right  Pain Description 1: Aching; Throbbing  Pain Intervention(s) 1: Medication (see MAR)  After treatment:   [X]    Patient left in no apparent distress sitting up in chair  [ ]    Patient left in no apparent distress in bed  [X]    Call bell left within reach  [X]    Nursing notified  [X]    Caregiver present  [ ]    Bed alarm activated      COMMUNICATION/COLLABORATION:   The patients plan of care was discussed with: Registered Nurse     Kiya Johnson, PT   Time Calculation: 14 mins

## 2017-08-15 NOTE — PROGRESS NOTES
Faculty or Preceptor Review of Student Work    8/15/2017  - Shift times - [phone] to [phone]    The student documentation of patient care for Oksana Cuellar has been reviewed and approved.       Teresa Ortega RN

## 2017-08-15 NOTE — PROGRESS NOTES
Bedside shift change report given to Lesley Cheung RN (oncoming nurse) by Naima Lerner RN (offgoing nurse). Report included the following information SBAR.

## 2017-08-15 NOTE — PROGRESS NOTES
Bedside shift change report given to Claudetta Hawthorne, RN (oncoming nurse) by Angelita Moseley RN (offgoing nurse). Report included the following information SBAR and Kardex.

## 2017-08-15 NOTE — PROGRESS NOTES
Hospitalist Progress Note        Date of Service:  8/15/2017  NAME:  Jyotsna Toussaint  :  1950  MRN:  [id number]      Admission Summary:   78 yo female admitted for fall and dizziness. Pt with PMhx of Breast Ca now being workup for bone/liver mets. Pt s/p Rt iliac bone bx 6 days ago. Interval history / Subjective:   F/u orthostatic hypotension  Feels well today. Denies pain, dyspnea, n/v/d, dizziness. Worked with PT yesterday. Noted her RLE is edematous, she states this is chronic but not sure for how long.      Assessment & Plan:     S/p Fall likely related to Orthostatic hypotension  - resolved  - in ER BP 86/57  - PT rec outpt therapy    Acute on Chronic Anemia  - no sign of GI bleed or other source  - 2U PRBCs   - occult blood stool negative    SIRS with possible sepsis, no source at this time  - c/o LLQ tenderness and RUQ tenderness  - started Zosyn , Levaquin   -Flagyl stopped   -culture remains negative but leukocytosis persists  - Abdominal sono : biliary sludge and echogenic enlarged liver; CT abd w/ fluid around gallbladder likely ascites  - Blood cx: NGTD    Asymmetric LE edema: +RLE edema  - doppler positive for DVT, started on Lovenox    Hyponatremia  - likely related to Hypovolemia  - resolved with IVF    VERNELL due to hypovolemia  - resolved with IVF    Hypokalemia   - resolved     Hx of Breast Ca now likely bone and liver mets  - onc consulted  - s/p Rt Iliac bone bx on  positive for metastatic adenocarcinoma      Code status: FULL  DVT prophylaxis: Lovenox    Care Plan discussed with: Patient/Family and Nurse  Disposition: Home w/Family     Hospital Problems  Date Reviewed: 2017          Codes Class Noted POA    Hyponatremia ICD-10-CM: E87.1  ICD-9-CM: 276.1  2017 Unknown        Leukocytosis ICD-10-CM: X58.676  ICD-9-CM: 288.60  2017 Unknown        * (Principal)VERNELL (acute kidney injury) ([facility] [address] ICD-10-CM: N17.9  ICD-9-CM: 584.9  8/11/2017 Unknown        Metastatic breast cancer ([facility] [address] ICD-10-CM: C50.919, C79.9  ICD-9-CM: 174.9, 199.1  8/11/2017 Unknown                Review of Systems:   A comprehensive review of systems was negative except for that written in the HPI. Vital Signs:    Last 24hrs VS reviewed since prior progress note. Most recent are:  Visit Vitals    /81 (BP 1 Location: Left arm, BP Patient Position: At rest)    Pulse 94    Temp 98.5 °F (36.9 °C)    Resp 18    Ht 5' 7\" (1.702 m)    Wt 61.7 kg (136 lb)    SpO2 98%    BMI 21.3 kg/m2         Intake/Output Summary (Last 24 hours) at 08/15/17 0916  Last data filed at 08/15/17 7176   Gross per 24 hour   Intake             1505 ml   Output                0 ml   Net             1505 ml        Physical Examination:             Constitutional:  No acute distress, cooperative, pleasant    ENT:  anicteric sclerae, normal conjunctiva   Resp:  CTA bilaterally. No wheezing/rhonchi/rales. CV:  Regular rhythm, normal rate, no MRG, no JVD    GI:  Soft, non distended, +diffusely tender, normoactive bowel sounds,     Musculoskeletal:  1+ RLE edema, warm, 2+ pulses throughout    Neurologic:  Moves all extremities. AAOx3, CN II-XII reviewed, normal speech.  Takes longer than normal to recall details and answer questions     Skin:  Good turgor, no rashes or ulcers       Data Review:    Review and/or order of clinical lab test  Review and/or order of tests in the radiology section of CPT  Review and/or order of tests in the medicine section of CPT      Labs:     Recent Labs      08/15/17   0354  08/14/17 0218   WBC  19.2*  16.8*   HGB  8.6*  9.1*   HCT  26.3*  27.6*   PLT  366  310     Recent Labs      08/15/17   0354  08/14/17   0218  08/13/17   0318   NA  134*  136  134*   K  3.9  3.6  3.6   CL  98  99  97   CO2  26  27  27   BUN  8  7  8   CREA  0.64  0.70  0.83   GLU  84  100  97   CA  8.5  8.6  9.3   MG  1.9   --    --    PHOS  2.4* --    --      Recent Labs      08/14/17   0218   SGOT  43*   ALT  34   AP  377*   TBILI  1.3*   TP  6.8   ALB  1.8*   GLOB  5.0*     No results for input(s): INR, PTP, APTT in the last 72 hours. No lab exists for component: INREXT, INREXT   No results for input(s): FE, TIBC, PSAT, FERR in the last 72 hours. Lab Results   Component Value Date/Time    Folate 13.0 08/08/2017 03:25 AM      No results for input(s): PH, PCO2, PO2 in the last 72 hours. No results for input(s): CPK, CKNDX, TROIQ in the last 72 hours.     No lab exists for component: CPKMB  Lab Results   Component Value Date/Time    Cholesterol, total 285 03/31/2016 12:00 AM    HDL Cholesterol 91 03/31/2016 12:00 AM    LDL, calculated 174 03/31/2016 12:00 AM    Triglyceride 98 03/31/2016 12:00 AM     Lab Results   Component Value Date/Time    Glucose (POC) 94 03/03/2017 04:12 PM    Glucose POC 94 06/21/2017 03:29 PM     Lab Results   Component Value Date/Time    Color DARK YELLOW 08/11/2017 01:44 PM    Appearance CLOUDY 08/11/2017 01:44 PM    Specific gravity 1.010 08/11/2017 01:44 PM    pH (UA) 6.0 08/11/2017 01:44 PM    Protein 30 08/11/2017 01:44 PM    Glucose NEGATIVE  08/11/2017 01:44 PM    Ketone NEGATIVE  08/11/2017 01:44 PM    Urobilinogen 2.0 08/11/2017 01:44 PM    Nitrites NEGATIVE  08/11/2017 01:44 PM    Leukocyte Esterase NEGATIVE  08/11/2017 01:44 PM    Epithelial cells FEW 08/11/2017 01:44 PM    Bacteria NEGATIVE  08/11/2017 01:44 PM    WBC 0-4 08/11/2017 01:44 PM    RBC 0-5 08/11/2017 01:44 PM         Medications Reviewed:     Current Facility-Administered Medications   Medication Dose Route Frequency    enoxaparin (LOVENOX) injection 60 mg  1 mg/kg SubCUTAneous Q12H    piperacillin-tazobactam (ZOSYN) 3.375 g in 0.9% sodium chloride (MBP/ADV) 100 mL  3.375 g IntraVENous Q8H    levoFLOXacin (LEVAQUIN) tablet 750 mg  750 mg Oral Q24H    0.9% sodium chloride infusion 250 mL  250 mL IntraVENous PRN    0.9% sodium chloride infusion 250 mL 250 mL IntraVENous PRN    acetaminophen (TYLENOL) tablet 650 mg  650 mg Oral Q6H PRN    oxyCODONE IR (ROXICODONE) tablet 5 mg  5 mg Oral Q4H PRN    aspirin delayed-release tablet 81 mg  81 mg Oral DAILY    ondansetron (ZOFRAN ODT) tablet 4 mg  4 mg Oral Q6H PRN    sodium chloride (NS) flush 5-10 mL  5-10 mL IntraVENous Q8H    sodium chloride (NS) flush 5-10 mL  5-10 mL IntraVENous PRN     ______________________________________________________________________  EXPECTED LENGTH OF STAY: 3d 19h  ACTUAL LENGTH OF STAY:          Roxane Muse MD

## 2017-08-16 NOTE — PROGRESS NOTES
Hospitalist Progress Note        Date of Service:  2017  NAME:  Benito Puente  :  1950  MRN:  [id number]      Admission Summary:   76 yo female admitted for fall and dizziness. Pt with PMhx of Breast Ca now being workup for bone/liver mets. Pt s/p Rt iliac bone bx 6 days ago. Interval history / Subjective:   F/u orthostatic hypotension  Feels well today. Denies pain, dyspnea, n/v/d, dizziness. Worked with PT yesterday. Noted her RLE is edematous, she states this is chronic but not sure for how long.      Assessment & Plan:     S/p Fall likely related to Orthostatic hypotension  - resolved  - in ER BP 86/57  - PT rec outpt therapy    Acute on Chronic Anemia  - no sign of GI bleed or other source  - 2U PRBCs   - occult blood stool negative    SIRS with possible sepsis, no source at this time  - c/o LLQ tenderness and RUQ tenderness  - started Zosyn , continues  -Flagyl stopped   -LVQ discontinued 8/15  -culture remains negative but leukocytosis persists  - Abdominal sono : biliary sludge and echogenic enlarged liver; CT abd w/ fluid around gallbladder likely ascites  - Blood cx: NGTD  -Appreciate ID, Tagged WBC scan today, if normal will discharge the patient    Asymmetric LE edema: +RLE edema  - doppler positive for DVT, started on Lovenox  -Spoke to Dr Reno Huggins, appreciate discussion, discharge the patient on Lovenox    Hyponatremia  - likely related to Hypovolemia  - resolved with IVF    VERNELL due to hypovolemia  - resolved with IVF    Hypokalemia   - resolved     Hx of Breast Ca now likely bone and liver mets  - onc consulted  - s/p Rt Iliac bone bx on  positive for metastatic adenocarcinoma    PT: Outpatient PT    Code status: FULL  DVT prophylaxis: Lovenox    Plan: Follow WBC scan today, discharge on Lovenox today vs tomorrow    Care Plan discussed with: Patient/Family and Nurse  Disposition: Home w/Family Hospital Problems  Date Reviewed: 8/14/2017          Codes Class Noted POA    Hyponatremia ICD-10-CM: E87.1  ICD-9-CM: 276.1  8/11/2017 Unknown        Leukocytosis ICD-10-CM: Z26.272  ICD-9-CM: 288.60  8/11/2017 Unknown        * (Principal)VERNELL (acute kidney injury) ([facility] ICD-10-CM: N17.9  ICD-9-CM: 584.9  8/11/2017 Unknown        Metastatic breast cancer ([facility] ICD-10-CM: C50.919, C79.9  ICD-9-CM: 174.9, 199.1  8/11/2017 Unknown                Review of Systems:   A comprehensive review of systems was negative except for that written in the HPI. Vital Signs:    Last 24hrs VS reviewed since prior progress note. Most recent are:  Visit Vitals    /70 (BP 1 Location: Right arm, BP Patient Position: At rest)    Pulse 90    Temp 99.3 °F (37.4 °C)    Resp 18    Ht 5' 7\" (1.702 m)    Wt 61.7 kg (136 lb)    SpO2 99%    BMI 21.3 kg/m2         Intake/Output Summary (Last 24 hours) at 08/16/17 0857  Last data filed at 08/15/17 1435   Gross per 24 hour   Intake              120 ml   Output                0 ml   Net              120 ml        Physical Examination:             Constitutional:  No acute distress, cooperative, pleasant    ENT:  anicteric sclerae, normal conjunctiva   Resp:  CTA bilaterally. No wheezing/rhonchi/rales. CV:  Regular rhythm, normal rate, no MRG, no JVD    GI:  Soft, non distended, +diffusely tender, normoactive bowel sounds,     Musculoskeletal:  1+ RLE edema, warm, 2+ pulses throughout    Neurologic:  Moves all extremities. AAOx3, CN II-XII reviewed, normal speech.  Takes longer than normal to recall details and answer questions     Skin:  Good turgor, no rashes or ulcers       Data Review:    Review and/or order of clinical lab test  Review and/or order of tests in the radiology section of CPT  Review and/or order of tests in the medicine section of CPT      Labs:     Recent Labs      08/16/17   0233  08/15/17   0354   WBC  19.1*  19.2*   HGB  8.6*  8.6*   HCT  27.4*  26.3* PLT  441*  366     Recent Labs      08/16/17   0233  08/15/17   0354  08/14/17   0218   NA  134*  134*  136   K  3.7  3.9  3.6   CL  99  98  99   CO2  25  26  27   BUN  8  8  7   CREA  0.68  0.64  0.70   GLU  97  84  100   CA  8.5  8.5  8.6   MG   --   1.9   --    PHOS   --   2.4*   --      Recent Labs      08/14/17   0218   SGOT  43*   ALT  34   AP  377*   TBILI  1.3*   TP  6.8   ALB  1.8*   GLOB  5.0*     No results for input(s): INR, PTP, APTT in the last 72 hours. No lab exists for component: INREXT, INREXT   No results for input(s): FE, TIBC, PSAT, FERR in the last 72 hours. Lab Results   Component Value Date/Time    Folate 13.0 08/08/2017 03:25 AM      No results for input(s): PH, PCO2, PO2 in the last 72 hours. No results for input(s): CPK, CKNDX, TROIQ in the last 72 hours.     No lab exists for component: CPKMB  Lab Results   Component Value Date/Time    Cholesterol, total 285 03/31/2016 12:00 AM    HDL Cholesterol 91 03/31/2016 12:00 AM    LDL, calculated 174 03/31/2016 12:00 AM    Triglyceride 98 03/31/2016 12:00 AM     Lab Results   Component Value Date/Time    Glucose (POC) 94 03/03/2017 04:12 PM    Glucose POC 94 06/21/2017 03:29 PM     Lab Results   Component Value Date/Time    Color DARK YELLOW 08/11/2017 01:44 PM    Appearance CLOUDY 08/11/2017 01:44 PM    Specific gravity 1.010 08/11/2017 01:44 PM    pH (UA) 6.0 08/11/2017 01:44 PM    Protein 30 08/11/2017 01:44 PM    Glucose NEGATIVE  08/11/2017 01:44 PM    Ketone NEGATIVE  08/11/2017 01:44 PM    Urobilinogen 2.0 08/11/2017 01:44 PM    Nitrites NEGATIVE  08/11/2017 01:44 PM    Leukocyte Esterase NEGATIVE  08/11/2017 01:44 PM    Epithelial cells FEW 08/11/2017 01:44 PM    Bacteria NEGATIVE  08/11/2017 01:44 PM    WBC 0-4 08/11/2017 01:44 PM    RBC 0-5 08/11/2017 01:44 PM         Medications Reviewed:     Current Facility-Administered Medications   Medication Dose Route Frequency    heparin (porcine) 1,000 unit/mL injection 5,000 Units 5,000 Units IntraVENous RAD ONCE    heparin (porcine) 1,000 unit/mL injection        enoxaparin (LOVENOX) injection 60 mg  1 mg/kg SubCUTAneous Q12H    piperacillin-tazobactam (ZOSYN) 3.375 g in 0.9% sodium chloride (MBP/ADV) 100 mL  3.375 g IntraVENous Q8H    0.9% sodium chloride infusion 250 mL  250 mL IntraVENous PRN    0.9% sodium chloride infusion 250 mL  250 mL IntraVENous PRN    acetaminophen (TYLENOL) tablet 650 mg  650 mg Oral Q6H PRN    oxyCODONE IR (ROXICODONE) tablet 5 mg  5 mg Oral Q4H PRN    aspirin delayed-release tablet 81 mg  81 mg Oral DAILY    ondansetron (ZOFRAN ODT) tablet 4 mg  4 mg Oral Q6H PRN    sodium chloride (NS) flush 5-10 mL  5-10 mL IntraVENous Q8H    sodium chloride (NS) flush 5-10 mL  5-10 mL IntraVENous PRN     ______________________________________________________________________  EXPECTED LENGTH OF STAY: 3d 19h  ACTUAL LENGTH OF STAY:          Noé Pires MD

## 2017-08-16 NOTE — DISCHARGE INSTRUCTIONS
Discharge Instructions       PATIENT ID: Nasir Young  MRN: [id number]   YOB: 1950    DATE OF ADMISSION: 8/11/2017  1:22 PM    DATE OF DISCHARGE: 8/16/2017    PRIMARY CARE PROVIDER: Emil Hathaway MD     ATTENDING PHYSICIAN: Yves Gibson MD  DISCHARGING PROVIDER: Yves Gibson MD    To contact this individual call [phone] and ask the  to page. If unavailable ask to be transferred the Adult Hospitalist Department. DISCHARGE DIAGNOSES   Dehydration  DVT    CONSULTATIONS: IP CONSULT TO HOSPITALIST  IP CONSULT TO ONCOLOGY  IP CONSULT TO INFECTIOUS DISEASES    PROCEDURES/SURGERIES: * No surgery found *    PENDING TEST RESULTS:   At the time of discharge the following test results are still pending: none    FOLLOW UP APPOINTMENTS:   Follow-up Information     Follow up With Details Comments Contact Info    Emil Hathaway MD In 1 week  [address]      iMgue Gibbs MD In 1 week  [place] [address]  [phone]             ADDITIONAL CARE RECOMMENDATIONS:   Follow up with Dr Mary Gomez in 1 week    DIET: Cardiac Diet    ACTIVITY: Activity as tolerated      DISCHARGE MEDICATIONS:   See Medication Reconciliation Form    · It is important that you take the medication exactly as they are prescribed. · Keep your medication in the bottles provided by the pharmacist and keep a list of the medication names, dosages, and times to be taken in your wallet. · Do not take other medications without consulting your doctor. NOTIFY YOUR PHYSICIAN FOR ANY OF THE FOLLOWING:   Fever over 101 degrees for 24 hours. Chest pain, shortness of breath, fever, chills, nausea, vomiting, diarrhea, change in mentation, falling, weakness, bleeding. Severe pain or pain not relieved by medications. Or, any other signs or symptoms that you may have questions about.       DISPOSITION:  x  Home With:   OT  PT  [facility]  RN SNF/Inpatient Rehab/LTAC    Independent/assisted living    Hospice    Other:     CDMP Checked:   Yes x     PROBLEM LIST Updated:  Yes x       Signed:   Chaya German MD  8/16/2017  4:35 PM

## 2017-08-16 NOTE — PROGRESS NOTES
Patient verbalized and demonstrated the ability to inject her abdomen with Lovenox. Video shown to patient and starter package given to her about medication.

## 2017-08-16 NOTE — PROGRESS NOTES
Call from [facility] stating that Dr. Ashley Vargas wants to discharge patient on Lovenox can we assist. I got a kit and a goodrx discount for $113.00 at [facility]. Took it to the floor and gave to [facility] so she could do patient teaching.   Britney Carranza RN CRM

## 2017-08-16 NOTE — PROGRESS NOTES
-Hematology / Oncology ([facility]) -  -CC- metastatic dz, hx breast cancer    -S-  feeling about the same    -O-      Patient Vitals for the past 24 hrs:   Temp Pulse Resp BP SpO2   08/16/17 0230 99.3 °F (37.4 °C) 90 18 140/70 99 %   08/15/17 2202 98.3 °F (36.8 °C) 96 18 146/78 99 %   08/15/17 1449 98.5 °F (36.9 °C) 92 16 120/71 97 %   08/15/17 1135 98.1 °F (36.7 °C) 89 14 120/68 100 %        Gen: nad  Chest: bilateral breath sounds present  Cardiac: rrr  Abd: s/nt  Extr: minimal RLE edema, doesn't look red    -Labs-    Recent Labs      08/16/17   0233  08/15/17   0354  08/14/17   0218   WBC  19.1*  19.2*  16.8*   HGB  8.6*  8.6*  9.1*   PLT  441*  366  310   ANEU   --    --   14.3*   NA  134*  134*  136   K  3.7  3.9  3.6   GLU  97  84  100   BUN  8  8  7   CREA  0.68  0.64  0.70   ALT   --    --   34   SGOT   --    --   43*   TBILI   --    --   1.3*   AP   --    --   377*   CA  8.5  8.5  8.6   MG   --   1.9   --    PHOS   --   2.4*   --        8/7: UA- few epi  8/8: b12: 723, fol 13, iron 23, tibc 158, sharda 673  -- ca 15-3: 2194; ca 29-29: 3843     -Imaging-   7/29/17- MRI L spine - Suspect widespread metastatic disease involving the spine and liver and pelvis. L3 vertebra appears largely replaced by tumor.  Images personally reviewed.      8/7/17- Head CT w/o contrast- normal   8/7/17- MRI brain- Essentially normal   8/7/17: CT c-a-p (w):   1. 2.6 cm x 2.8 cm right lower lobe lesion consistent with metastatic disease. 8 mm lingular nodule. 2. Extensive hepatic metastatic disease. 3. Osseous metastatic disease, as described above. 4. Bilateral adrenal lesions, indeterminate, but worrisome for metastatic deposit. 5. Trace fluid in the pelvis     8/8- bone scan- IMPRESSION: Multiple foci of abnormal tracer activity in the skeleton,  corresponding to lytic bone lesions seen on CT.  These findings are compatible  with osseous metastatic disease.     8/14- doppler-      Right: Consistent with thrombosis involving the popliteal,  gastrocnemius, posterior tibial, peronial, and soleal veins as  demonstrated by vein non-compressibility, and by a narrowing or  occlusion of the flow channel on color Doppler imaging. The common  femoral, deep femoral, femoral, and great saphenous are patent and  without evidence of thrombus; each is is fully compressible and there  is no narrowing of the flow channel on color Doppler imaging. Phasic  flow is observed in the common femoral vein. Left:   The common femoral vein is patent and without evidence of   thrombus. Phasic flow is observed. This extremity was not otherwise   evaluated. IMPRESSION:  There is evidence of vein thrombosis, as described above. The ultrasound appearance is more consistent with an acute than a  chronic process.    -Assessment + Plan-     *) breast cancer- recurrent triple negative breast cancer recently dx per pelvis bx 8/7/17  ---- additionally, involvement of lung, liver (multifocal), possible adrenal (B)  ---- Discussed possible options such as single-agent xeloda vs combination tx. Also discussed anti-resorption agents for bone dz. Made plans for her to see me after discharge for arrangement of therapy. *) anemia- responded to 2 units prbc 8/12 for hgb 6.4  *) DVT (calf + popliteal): agree with lovenox (setting of cancer)    Suspect current issues largely secondary to cancer. Reviewed plan for outpt f/u in clinic for arrangement of chemo, likely capecitabine (1,000 mg/m2 twice daily on days 1 to 14 of a 21-day treatment cycle).     Case discussed with nursing and Dr Heriberto Matthews

## 2017-08-16 NOTE — CONSULTS
[address]   [address] [facility]       Name:  Antonio Gil   MR#:  [id number]   :  1950   Account #:  [de-identified]    Date of Consultation:  08/15/2017   Date of Adm:  2017       REQUESTING PHYSICIAN: Dr. Frankey Slack: Leukocytosis. HISTORY OF PRESENT ILLNESS: The patient is a 60-year-old   woman who has a history of breast cancer, for which she had a double   mastectomy 2 years ago. Her last chemotherapy was in . Unfortunately, she was recently diagnosed to have a recurrence of her   breast cancer. It has metastasized to her bones, liver and lungs. She   was actually admitted last week for intractable back pain. She was   discharged, but had to be readmitted soon because she felt dizzy. She   catheterized dizziness as being lightheaded. She did not really have   any fever, but she says she has been having some sweats. On   readmission, her white count was elevated. C difficile was tested, and   it was negative. Chest x-ray was negative. A urinalysis only shows 0-4   white blood cells. She was started on Zosyn and Levaquin because it   was thought that she had diverticulitis. CT scan of the abdomen though   did not show any source of infection, but it did show metastatic   disease. It was noted that her right leg was swollen and Doppler   confirmed the existence of a DVT. We are now being asked to see her   in consult. ALLERGIES: CODEINE. CURRENT MEDICATIONS   1. Acetaminophen. 2. Aspirin. 3. Lovenox. 4. Zosyn. 5. Levaquin. REVIEW OF SYSTEMS: She has no chills, no dyspnea. She does   have an occasional cough. There is some abdominal discomfort. No   dysuria, no headache, no sore throat. No rash. She does have some   diarrhea. Aside from these things, the rest of review of systems is   negative. PAST MEDICAL HISTORY   1. Anxiety and depression. 2. Breast cancer. 3. GERD. 4. Hypertension. 5. Sickle cell trait. PAST SURGICAL HISTORY   1. Hernia repair. 2. Port-A-Cath placement and removal.   3. Bilateral mastectomy. 4. Reconstruction surgery. 5. Colonoscopy. 6. Hysterectomy. 7. Orthopedic surgery. 8. [place] tooth extraction. FAMILY HISTORY: Her mother had cancer. Her father had heart   disease and diabetes. Her sister had breast cancer and multiple   sclerosis. Her paternal grandmother had brain cancer. Her maternal   grandfather had oral cancer. SOCIAL HISTORY: She does not smoke tobacco. She did use   marijuana in the past. She does not drink anymore alcohol. PHYSICAL EXAMINATION   GENERAL: She is not in respiratory distress. VITAL SIGNS: Temperature is 98.5, pulse 92, blood pressure 120/71,   saturating at 97%, respiration 16. HEENT: She has pink conjunctivae, anicteric sclerae. No JVD. No   cervical lymphadenopathy. External ears are normal.   LUNGS: Clear to auscultation. No rales, wheezes or rhonchi. HEART: No murmurs rubs or clicks. ABDOMEN: Soft, nontender, no guarding, no rebound. GENITOURINARY: No CVA tenderness. MUSCULOSKELETAL: The right leg is warmer and more swollen than   the left leg. PSYCHIATRIC: No disturbance of thought. Mood and affect are   appropriate. INTEGUMENT: I did not notice any rash. NEUROLOGIC: She has full use of her extraocular muscles. Tongue   midline. No facial asymmetry. Muscle strength is 5/5. LABORATORY DATA: White blood cell count is 19.2, hemoglobin 8.6. Urinalysis, 0-4 white blood cells. Creatinine 0.64, AST 43, ALT 34,   alkaline phosphatase 377. Clostridium difficile is negative. Chest x-ray   is negative. MRI is normal. She has a right lower extremity DVT. IMPRESSION   1. Leukocytosis. 2. Acute right lower extremity deep venous thrombosis. 3. Metastatic breast cancer. 4. Sickle cell trait. PLAN   1. Her exam is only remarkable for swelling and warmth of the right leg, which   is caused by a DVT. Thromboembolism itself can cause leukocytosis   and perhaps this is the reason for the elevated white blood cell. I   will get a WBC tagged scan tomorrow. 2. I would discontinue Levaquin. Thank you for this consult.         Rochelle Wray MD      AG / OLGA   D:  08/15/2017   19:51   T:  08/15/2017   21:28   Job #:  [id number]

## 2017-08-16 NOTE — PROGRESS NOTES
Faculty or Preceptor Review of Student Work    8/16/2017  - Shift times - 0730 to 12    The student documentation of patient care for Brandi Gee has been reviewed and approved. All medications have been administered under the direct supervision of the faculty or preceptor.     Caroline Wallace RN

## 2017-08-16 NOTE — DISCHARGE SUMMARY
Discharge Summary       PATIENT ID: Karla Little  MRN: [id number]   YOB: 1950    DATE OF ADMISSION: 8/11/2017  1:22 PM    DATE OF DISCHARGE: 8/16/2017   PRIMARY CARE PROVIDER: Angela Fang MD     ATTENDING PHYSICIAN: Dr Marjean Nissen  DISCHARGING PROVIDER: Marjean Nissen, MD    To contact this individual call [phone] and ask the  to page. If unavailable ask to be transferred the Adult Hospitalist Department. CONSULTATIONS: IP CONSULT TO HOSPITALIST  IP CONSULT TO ONCOLOGY  IP CONSULT TO INFECTIOUS DISEASES    PROCEDURES/SURGERIES: * No surgery found *    ADMITTING [address] [place] [address] COURSE:   S/p Fall likely related to Orthostatic hypotension  - resolved  - in ER BP 86/57  - PT rec outpt therapy     Acute on Chronic Anemia  - no sign of GI bleed or other source  - 2U PRBCs 8/13  - occult blood stool negative     SIRS with possible sepsis, no source at this time  - c/o LLQ tenderness and RUQ tenderness  - started Zosyn 8/13, continues  -Flagyl stopped 8/13  -LVQ discontinued 8/15  -culture remains negative but leukocytosis persists  - Abdominal sono : biliary sludge and echogenic enlarged liver; CT abd w/ fluid around gallbladder likely ascites  - Blood cx: NGTD  -Appreciate ID, Tagged WBC scan today, if normal will discharge the patient     Asymmetric LE edema: +RLE edema  - doppler positive for DVT, started on Lovenox  -Spoke to Dr Otto Cartagena, appreciate discussion, discharge the patient on Lovenox     Hyponatremia  - likely related to Hypovolemia  - resolved with IVF     VERNELL due to hypovolemia  - resolved with IVF     Hypokalemia   - resolved      Hx of Breast Ca now likely bone and liver mets  - onc consulted  - s/p Rt Iliac bone bx on 8/7 positive for metastatic adenocarcinoma     PT: Outpatient PT     Code status: FULL  DVT prophylaxis: Lovenox        DISCHARGE DIAGNOSES / PLAN:      1.  Dehydration  2. DVT  3.  Metastatic breast cancer       PENDING TEST RESULTS:   At the time of discharge the following test results are still pending: none    FOLLOW UP APPOINTMENTS:    Follow-up Information     Follow up With Details Comments Contact Ibeth Valenzuela MD In 1 week  [facility] [address]      He Saldana MD In 1 week  [place] [id number]  [phone]             ADDITIONAL CARE RECOMMENDATIONS:   Follow up with PMD in 1 week  Follow up with Dr Nazanin Ashford in 1 week  Call PMD if worsening symptoms    DIET: Cardiac Diet    ACTIVITY: Activity as tolerated      DISCHARGE MEDICATIONS:  Current Discharge Medication List      START taking these medications    Details   oxyCODONE IR (ROXICODONE) 5 mg immediate release tablet Take 1 Tab by mouth every four (4) hours as needed. Max Daily Amount: 30 mg.  Qty: 30 Tab, Refills: 0      enoxaparin (LOVENOX) 100 mg/mL 60 mg by SubCUTAneous route every twelve (12) hours every twelve (12) hours. Qty: 60 Syringe, Refills: 2         CONTINUE these medications which have NOT CHANGED    Details   ondansetron (ZOFRAN ODT) 4 mg disintegrating tablet Take 1 Tab by mouth every six (6) hours as needed. Qty: 30 Tab, Refills: 6      aspirin delayed-release 81 mg tablet Take 81 mg by mouth daily. multivitamin (ONE A DAY) tablet Take 1 Tab by mouth daily (after breakfast). Takes when remembered. STOP taking these medications       oxyCODONE ER (OXYCONTIN) 10 mg ER tablet Comments:   Reason for Stopping:                 NOTIFY YOUR PHYSICIAN FOR ANY OF THE FOLLOWING:   Fever over 101 degrees for 24 hours. Chest pain, shortness of breath, fever, chills, nausea, vomiting, diarrhea, change in mentation, falling, weakness, bleeding. Severe pain or pain not relieved by medications. Or, any other signs or symptoms that you may have questions about.     DISPOSITION:  x  Home With:   OT  PT  HH  RN       Long term SNF/Inpatient Rehab    Independent/assisted living    Hospice    Other: PATIENT CONDITION AT DISCHARGE:     Functional status    Poor     Deconditioned    x Independent      Cognition   x  Lucid     Forgetful     Dementia      Catheters/lines (plus indication)    [place]     PICC     PEG    x None      Code status    x Full code     DNR      PHYSICAL EXAMINATION AT DISCHARGE:  Please see progress note      CHRONIC MEDICAL DIAGNOSES:  Problem List as of 8/16/2017  Date Reviewed: 8/14/2017          Codes Class Noted - Resolved    Hyponatremia ICD-10-CM: E87.1  ICD-9-CM: 276.1  8/11/2017 - Present        Leukocytosis ICD-10-CM: D72.829  ICD-9-CM: 288.60  8/11/2017 - Present        * (Principal)VERNELL (acute kidney injury) ([facility] ICD-10-CM: N17.9  ICD-9-CM: 584.9  8/11/2017 - Present        Metastatic breast cancer ([facility] ICD-10-CM: C50.919, C79.9  ICD-9-CM: 174.9, 199.1  8/11/2017 - Present        Metastatic disease ([facility] ICD-10-CM: C79.9  ICD-9-CM: 199.1  8/7/2017 - Present        Breast cancer, stage 2 ([facility] ICD-10-CM: C50.919  ICD-9-CM: 174.9  9/28/2012 - Present              Greater than 39 minutes were spent with the patient on counseling and coordination of care    Signed:   Jaycee Lema MD  8/16/2017  4:35 PM

## 2017-08-16 NOTE — PROGRESS NOTES
ID Progress Note  2017    Subjective:     Afebrile. No complaints. No dyspnea, abdominal pain. Objective:     Vitals:   Visit Vitals    /70 (BP 1 Location: Right arm, BP Patient Position: At rest)    Pulse 90    Temp 99.3 °F (37.4 °C)    Resp 18    Ht 5' 7\" (1.702 m)    Wt 61.7 kg (136 lb)    SpO2 99%    BMI 21.3 kg/m2        Tmax:  Temp (24hrs), Av.6 °F (37 °C), Min:98.1 °F (36.7 °C), Max:99.3 °F (37.4 °C)      Exam:    Not in distress  Lungs: clear to auscultation  Heart: s1, s2, no murmurs   Abdomen: soft, nontender      Labs:   Lab Results   Component Value Date/Time    WBC 19.1 2017 02:33 AM    Hemoglobin (POC) 13.6 2017 04:12 PM    HGB 8.6 2017 02:33 AM    Hematocrit (POC) 40 2017 04:12 PM    HCT 27.4 2017 02:33 AM    PLATELET 924  02:33 AM    MCV 90.4 2017 02:33 AM     Lab Results   Component Value Date/Time    Sodium 134 2017 02:33 AM    Potassium 3.7 2017 02:33 AM    Chloride 99 2017 02:33 AM    CO2 25 2017 02:33 AM    Anion gap 10 2017 02:33 AM    Glucose 97 2017 02:33 AM    BUN 8 2017 02:33 AM    Creatinine 0.68 2017 02:33 AM    BUN/Creatinine ratio 12 2017 02:33 AM    GFR est AA >60 2017 02:33 AM    GFR est non-AA >60 2017 02:33 AM    Calcium 8.5 2017 02:33 AM    Bilirubin, total 1.3 2017 02:18 AM    AST (SGOT) 43 2017 02:18 AM    Alk. phosphatase 377 2017 02:18 AM    Protein, total 6.8 2017 02:18 AM    Albumin 1.8 2017 02:18 AM    Globulin 5.0 2017 02:18 AM    A-G Ratio 0.4 2017 02:18 AM    ALT (SGPT) 34 2017 02:18 AM         Assessment:     1. Leukocytosis. 2. Acute right lower extremity deep venous thrombosis. 3. Metastatic breast cancer. 4. Sickle cell trait. Recommendations:     She has no symptoms. Thromboembolism can cause leukocytosis and perhaps this is the reason for it. I will discontinue zosyn. She is for WBC tagged scan today.      Verito Hernadez MD

## 2017-08-16 NOTE — PROGRESS NOTES
Problem: Pain  Goal: *Control of Pain  Outcome: Progressing Towards Goal  Pt's has not requested pain medication 08/15 @ 0600.

## 2017-08-16 NOTE — PROGRESS NOTES
Spiritual Care Partner Volunteer visited patient in Oncology on 8/16/17. Documented by:  ELAINE Torrez

## 2017-08-17 NOTE — PROGRESS NOTES
Transition of Care  Contacted the patient's sister Kimberly Guerrier by telephone. Spoke with the patient. HIPAA was verified by name and . She states that she is feeling much better today. Her pain is well controlled at this time. She has been able to eat breakfast. No c/o constipation at this time. Medications  The patient had a question about obtaining the Lovenox medication at a lower price. I replied that are no reduced cost programs available to Medicare recipients that I am aware of. I requested for the patient to see if her [employer] policy would provide any assistance with the medication costs. The patient agreed to contact [facility] today. I asked the patient to discuss changing to a lower cost anticoagulant that may require more frequent testing with there PCP during the next office visit. She agrees to do so. PCP follow up  Appointment with Misa Motley MD is scheduled for . The patient offered no additional questions at this time expressed thanks and ended the call. Plan: will continue to follow for the next 30 days. Remind family to investigates community resources during the next contact.

## 2017-08-23 NOTE — PATIENT INSTRUCTIONS
[employer] Activation    Thank you for requesting access to [employer]. Please follow the instructions below to securely access and download your online medical record. [employer] allows you to send messages to your doctor, view your test results, renew your prescriptions, schedule appointments, and more. How Do I Sign Up? 1. In your internet browser, go to [email or web address]  2. Click on the First Time User? Click Here link in the Sign In box. You will be redirect to the New Member Sign Up page. 3. Enter your [employer]P Access Code exactly as it appears below. You will not need to use this code after youve completed the sign-up process. If you do not sign up before the expiration date, you must request a new code. VPEP Access Code: Activation code not generated  Current [employer]PEP Status: Active (This is the date your [employer] access code will )    4. Enter the last four digits of your Social Security Number (xxxx) and Date of Birth (mm/dd/yyyy) as indicated and click Submit. You will be taken to the next sign-up page. 5. Create a VPEP ID. This will be your [employer]P login ID and cannot be changed, so think of one that is secure and easy to remember. 6. Create a VPEP password. You can change your password at any time. 7. Enter your Password Reset Question and Answer. This can be used at a later time if you forget your password. 8. Enter your e-mail address. You will receive e-mail notification when new information is available in [address] 9. Click Sign Up. You can now view and download portions of your medical record. 10. Click the Download Summary menu link to download a portable copy of your medical information. Additional Information    If you have questions, please visit the Frequently Asked Questions section of the [employer] website at [email or web address] Remember, VPEP is NOT to be used for urgent needs. For medical emergencies, dial 911.

## 2017-08-23 NOTE — PROGRESS NOTES
Aletha Cranker is a 77 y.o. female and presents with Hospital Follow Up (bone cancer)  . Subjective:  She has a recent history of metastatic breast cancer,she is to undergo chemotherapy soon. She was seen in the hospital for a rt. DVT. Continues to have a lot of lower back pains       Review of Systems  Constitutional: negative for fevers, chills, anorexia and weight loss  Eyes:   negative for visual disturbance and irritation  ENT:   negative for tinnitus,sore throat,nasal congestion,ear pains. hoarseness  Respiratory:  negative for cough, hemoptysis, dyspnea,wheezing  CV:   negative for chest pain, palpitations, lower extremity edema  GI:   negative for nausea, vomiting, diarrhea, abdominal pain,melena  Endo:               negative for polyuria,polydipsia,polyphagia,heat intolerance  Genitourinary: negative for frequency, dysuria and hematuria  Integument:  negative for rash and pruritus  Hematologic:  negative for easy bruising and gum/nose bleeding  Musculoskel: myalgias, arthralgias, back pain, muscle weakness, joint pain  Neurological:  negative for headaches, dizziness, vertigo, memory problems and gait   Behavl/Psych:feelings of anxiety, depression, mood changes    Past Medical History:   Diagnosis Date    Anxiety and depression     Breast CA ([facility] [address] 2012    Left only but had a bilateral mastectomy/chemo    GERD (gastroesophageal reflux disease)     not an issure, no medications    Hypertension     Ill-defined condition     pinched nerve in back    Other ill-defined conditions     last chemo treatment 1/30/13    Sickle cell trait ([facility] [address]      Past Surgical History:   Procedure Laterality Date    ABDOMEN SURGERY PROC UNLISTED      hernia as child    BREAST SURGERY PROCEDURE UNLISTED  10/11/12    LEFT SLNB and port-a-cath insertion    BREAST SURGERY PROCEDURE UNLISTED      bilateral mastectomy with reconstruction- states not arm restricted    COLONOSCOPY N/A 5/10/2017    COLONOSCOPY [facility] Shanique [facility]orano [facility] ENDOSCOPY    HX BREAST RECONSTRUCTION  7/1/2013    BREAST TISSUE EXPANDER REMOVAL performed by Mychal Aragon MD at [address] [place] [address] HX 7 [facility] HX LYMPHADENECTOMY  October 2012    sentinel node biopsy, negative    HX ORTHOPAEDIC Bilateral     ground down the bone d/t pressure on toes from shoes    HX VASCULAR ACCESS      insertion and removal    HX WISDOM TEETH EXTRACTION       Social History     Social History    Marital status:      Spouse name: N/A    Number of children: N/A    Years of education: N/A     Social History Main Topics    Smoking status: Never Smoker    Smokeless tobacco: Never Used    Alcohol use No    Drug use: Yes     Special: Marijuana      Comment: 7 MONTHS AGO    Sexual activity: Not Asked     Other Topics Concern    None     Social History Narrative     Family History   Problem Relation Age of Onset    Cancer Mother      colon cancer    Heart Disease Father     Diabetes Father     Cancer Sister 27     breast cancer    MS Sister     Cancer Other 36     breast cancer    Cancer Maternal Grandfather      MOUTH/THROAT    Cancer Paternal Grandmother      BRAIN    Sickle Cell Anemia Brother     Malignant Hyperthermia Neg Hx     Pseudocholinesterase Deficiency Neg Hx     Delayed Awakening Neg Hx     Post-op Nausea/Vomiting Neg Hx     Emergence Delirium Neg Hx     Post-op Cognitive Dysfunction Neg Hx     Other Neg Hx      Current Outpatient Prescriptions   Medication Sig Dispense Refill    ELIQUIS 5 mg tablet TK 1 T PO BID  0    oxyCODONE IR (ROXICODONE) 5 mg immediate release tablet Take 1 Tab by mouth every four (4) hours as needed. Max Daily Amount: 30 mg. 30 Tab 0    enoxaparin (LOVENOX) 100 mg/mL 60 mg by SubCUTAneous route every twelve (12) hours every twelve (12) hours. 60 Syringe 2    ondansetron (ZOFRAN ODT) 4 mg disintegrating tablet Take 1 Tab by mouth every six (6) hours as needed.  30 Tab 6    multivitamin (ONE A DAY) tablet Take 1 Tab by mouth daily (after breakfast). Takes when remembered. Allergies   Allergen Reactions    Codeine Nausea Only       Objective:  Visit Vitals    /70    Pulse (!) 111    Temp 98.2 °F (36.8 °C) (Oral)    Resp 16    Ht 5' 7\" (1.702 m)    Wt 136 lb (61.7 kg)    SpO2 98%    BMI 21.3 kg/m2     Physical Exam:   General appearance - alert, well appearing, and in no distress  Mental status - alert, oriented to person, place, and time  EYE-SERA, EOMI, corneas normal, no foreign bodies  ENT-ENT exam normal, no neck nodes or sinus tenderness  Nose - normal and patent, no erythema, discharge or polyps  Mouth - mucous membranes moist, pharynx normal without lesions  Neck - supple, no significant adenopathy   Chest - clear to auscultation, no wheezes, rales or rhonchi, symmetric air entry   Heart - normal rate, regular rhythm, normal S1, S2, no murmurs, rubs, clicks or gallops   Abdomen - soft, nontender, nondistended, no masses or organomegaly  Lymph- no adenopathy palpable  Ext-peripheral pulses normal, no pedal edema, no clubbing or cyanosis  Skin-Warm and dry.  no hyperpigmentation, vitiligo, or suspicious lesions  Neuro -alert, oriented, normal speech, no focal findings or movement disorder noted  Neck-normal C-spine, no tenderness, full ROM without pain  Feet-no nail deformities or callus formation with good pulses noted      Results for orders placed or performed during the hospital encounter of 08/11/17   CULTURE, BLOOD, PAIRED   Result Value Ref Range    Special Requests: NO SPECIAL REQUESTS      Culture result: NO GROWTH 5 DAYS     URINE CULTURE HOLD SAMPLE   Result Value Ref Range    Urine culture hold URINE ON HOLD IN MICROBIOLOGY DEPT FOR 3 DAYS     CULTURE, BLOOD, PAIRED   Result Value Ref Range    Special Requests: NO SPECIAL REQUESTS      Culture result: NO GROWTH 5 DAYS     C. DIFFICILE (DNA)   Result Value Ref Range    C. difficile (DNA) NEGATIVE NEG     CBC WITH AUTOMATED DIFF   Result Value Ref Range    WBC 20.1 (H) 3.6 - 11.0 K/uL    RBC 2.84 (L) 3.80 - 5.20 M/uL    HGB 8.2 (L) 11.5 - 16.0 g/dL    HCT 25.3 (L) 35.0 - 47.0 %    MCV 89.1 80.0 - 99.0 FL    MCH 28.9 26.0 - 34.0 PG    MCHC 32.4 30.0 - 36.5 g/dL    RDW 14.5 11.5 - 14.5 %    PLATELET 116 499 - 081 K/uL    NEUTROPHILS 82 (H) 32 - 75 %    LYMPHOCYTES 6 (L) 12 - 49 %    MONOCYTES 12 5 - 13 %    EOSINOPHILS 0 0 - 7 %    BASOPHILS 0 0 - 1 %    ABS. NEUTROPHILS 16.5 (H) 1.8 - 8.0 K/UL    ABS. LYMPHOCYTES 1.2 0.8 - 3.5 K/UL    ABS. MONOCYTES 2.4 (H) 0.0 - 1.0 K/UL    ABS. EOSINOPHILS 0.0 0.0 - 0.4 K/UL    ABS. BASOPHILS 0.0 0.0 - 0.1 K/UL    DF SMEAR SCANNED      RBC COMMENTS ANISOCYTOSIS  1+        RBC COMMENTS POLYCHROMASIA  PRESENT       METABOLIC PANEL, COMPREHENSIVE   Result Value Ref Range    Sodium 129 (L) 136 - 145 mmol/L    Potassium 3.8 3.5 - 5.1 mmol/L    Chloride 91 (L) 97 - 108 mmol/L    CO2 29 21 - 32 mmol/L    Anion gap 9 5 - 15 mmol/L    Glucose 131 (H) 65 - 100 mg/dL    BUN 15 6 - 20 MG/DL    Creatinine 1.74 (H) 0.55 - 1.02 MG/DL    BUN/Creatinine ratio 9 (L) 12 - 20      GFR est AA 35 (L) >60 ml/min/1.73m2    GFR est non-AA 29 (L) >60 ml/min/1.73m2    Calcium 8.9 8.5 - 10.1 MG/DL    Bilirubin, total 1.6 (H) 0.2 - 1.0 MG/DL    ALT (SGPT) 76 12 - 78 U/L    AST (SGOT) 169 (H) 15 - 37 U/L    Alk.  phosphatase 416 (H) 45 - 117 U/L    Protein, total 8.3 (H) 6.4 - 8.2 g/dL    Albumin 2.4 (L) 3.5 - 5.0 g/dL    Globulin 5.9 (H) 2.0 - 4.0 g/dL    A-G Ratio 0.4 (L) 1.1 - 2.2     TROPONIN I   Result Value Ref Range    Troponin-I, Qt. 0.09 (H) <0.05 ng/mL   URINALYSIS W/ RFLX MICROSCOPIC   Result Value Ref Range    Color DARK YELLOW      Appearance CLOUDY (A) CLEAR      Specific gravity 1.010 1.003 - 1.030      pH (UA) 6.0 5.0 - 8.0      Protein 30 (A) NEG mg/dL    Glucose NEGATIVE  NEG mg/dL    Ketone NEGATIVE  NEG mg/dL    Blood NEGATIVE  NEG      Urobilinogen 2.0 (H) 0.2 - 1.0 EU/dL    Nitrites NEGATIVE  NEG      Leukocyte Esterase NEGATIVE  NEG      WBC 0-4 0 - 4 /hpf    RBC 0-5 0 - 5 /hpf    Epithelial cells FEW FEW /lpf    Bacteria NEGATIVE  NEG /hpf    Amorphous Crystals FEW (A) NEG     LACTIC ACID   Result Value Ref Range    Lactic acid 1.2 0.4 - 2.0 MMOL/L   BILIRUBIN, CONFIRM   Result Value Ref Range    Bilirubin UA, confirm NEGATIVE  NEG     CBC WITH AUTOMATED DIFF   Result Value Ref Range    WBC 15.0 (H) 3.6 - 11.0 K/uL    RBC 2.28 (L) 3.80 - 5.20 M/uL    HGB 6.4 (L) 11.5 - 16.0 g/dL    HCT 20.3 (L) 35.0 - 47.0 %    MCV 89.0 80.0 - 99.0 FL    MCH 28.1 26.0 - 34.0 PG    MCHC 31.5 30.0 - 36.5 g/dL    RDW 14.4 11.5 - 14.5 %    PLATELET 061 227 - 735 K/uL    NEUTROPHILS 84 (H) 32 - 75 %    LYMPHOCYTES 6 (L) 12 - 49 %    MONOCYTES 10 5 - 13 %    EOSINOPHILS 0 0 - 7 %    BASOPHILS 0 0 - 1 %    ABS. NEUTROPHILS 12.6 (H) 1.8 - 8.0 K/UL    ABS. LYMPHOCYTES 0.9 0.8 - 3.5 K/UL    ABS. MONOCYTES 1.5 (H) 0.0 - 1.0 K/UL    ABS. EOSINOPHILS 0.0 0.0 - 0.4 K/UL    ABS.  BASOPHILS 0.0 0.0 - 0.1 K/UL    DF SMEAR SCANNED      PLATELET COMMENTS LARGE PLATELETS      RBC COMMENTS POLYCHROMASIA  1+        RBC COMMENTS ANISOCYTOSIS  1+       METABOLIC PANEL, BASIC   Result Value Ref Range    Sodium 132 (L) 136 - 145 mmol/L    Potassium 3.4 (L) 3.5 - 5.1 mmol/L    Chloride 98 97 - 108 mmol/L    CO2 27 21 - 32 mmol/L    Anion gap 7 5 - 15 mmol/L    Glucose 206 (H) 65 - 100 mg/dL    BUN 13 6 - 20 MG/DL    Creatinine 0.99 0.55 - 1.02 MG/DL    BUN/Creatinine ratio 13 12 - 20      GFR est AA >60 >60 ml/min/1.73m2    GFR est non-AA 56 (L) >60 ml/min/1.73m2    Calcium 7.9 (L) 8.5 - 10.1 MG/DL   TROPONIN I   Result Value Ref Range    Troponin-I, Qt. 0.10 (H) <0.05 ng/mL   CK W/ CKMB & INDEX   Result Value Ref Range     (H) 26 - 192 U/L    CK - MB <1.0 <3.6 NG/ML    CK-MB Index Cannot be calculated 0 - 2.5     OCCULT BLOOD, STOOL   Result Value Ref Range    Occult blood, stool NEGATIVE  NEG     CBC WITH AUTOMATED DIFF   Result Value Ref Range    WBC 19.5 (H) 3.6 - 11.0 K/uL    RBC 3.62 (L) 3.80 - 5.20 M/uL    HGB 10.6 (L) 11.5 - 16.0 g/dL    HCT 31.9 (L) 35.0 - 47.0 %    MCV 88.1 80.0 - 99.0 FL    MCH 29.3 26.0 - 34.0 PG    MCHC 33.2 30.0 - 36.5 g/dL    RDW 14.0 11.5 - 14.5 %    PLATELET 912 838 - 977 K/uL    NEUTROPHILS 83 (H) 32 - 75 %    LYMPHOCYTES 8 (L) 12 - 49 %    MONOCYTES 9 5 - 13 %    EOSINOPHILS 0 0 - 7 %    BASOPHILS 0 0 - 1 %    ABS. NEUTROPHILS 16.1 (H) 1.8 - 8.0 K/UL    ABS. LYMPHOCYTES 1.6 0.8 - 3.5 K/UL    ABS. MONOCYTES 1.7 (H) 0.0 - 1.0 K/UL    ABS. EOSINOPHILS 0.0 0.0 - 0.4 K/UL    ABS. BASOPHILS 0.0 0.0 - 0.1 K/UL   METABOLIC PANEL, BASIC   Result Value Ref Range    Sodium 134 (L) 136 - 145 mmol/L    Potassium 3.6 3.5 - 5.1 mmol/L    Chloride 97 97 - 108 mmol/L    CO2 27 21 - 32 mmol/L    Anion gap 10 5 - 15 mmol/L    Glucose 97 65 - 100 mg/dL    BUN 8 6 - 20 MG/DL    Creatinine 0.83 0.55 - 1.02 MG/DL    BUN/Creatinine ratio 10 (L) 12 - 20      GFR est AA >60 >60 ml/min/1.73m2    GFR est non-AA >60 >60 ml/min/1.73m2    Calcium 9.3 8.5 - 10.1 MG/DL   TSH 3RD GENERATION   Result Value Ref Range    TSH 3.14 0.36 - 3.74 uIU/mL   CBC WITH AUTOMATED DIFF   Result Value Ref Range    WBC 16.8 (H) 3.6 - 11.0 K/uL    RBC 3.16 (L) 3.80 - 5.20 M/uL    HGB 9.1 (L) 11.5 - 16.0 g/dL    HCT 27.6 (L) 35.0 - 47.0 %    MCV 87.3 80.0 - 99.0 FL    MCH 28.8 26.0 - 34.0 PG    MCHC 33.0 30.0 - 36.5 g/dL    RDW 14.2 11.5 - 14.5 %    PLATELET 202 459 - 111 K/uL    NEUTROPHILS 85 (H) 32 - 75 %    LYMPHOCYTES 5 (L) 12 - 49 %    MONOCYTES 10 5 - 13 %    EOSINOPHILS 0 0 - 7 %    BASOPHILS 0 0 - 1 %    ABS. NEUTROPHILS 14.3 (H) 1.8 - 8.0 K/UL    ABS. LYMPHOCYTES 0.9 0.8 - 3.5 K/UL    ABS. MONOCYTES 1.7 (H) 0.0 - 1.0 K/UL    ABS. EOSINOPHILS 0.0 0.0 - 0.4 K/UL    ABS.  BASOPHILS 0.0 0.0 - 0.1 K/UL   METABOLIC PANEL, COMPREHENSIVE   Result Value Ref Range    Sodium 136 136 - 145 mmol/L    Potassium 3.6 3.5 - 5.1 mmol/L    Chloride 99 97 - 108 mmol/L CO2 27 21 - 32 mmol/L    Anion gap 10 5 - 15 mmol/L    Glucose 100 65 - 100 mg/dL    BUN 7 6 - 20 MG/DL    Creatinine 0.70 0.55 - 1.02 MG/DL    BUN/Creatinine ratio 10 (L) 12 - 20      GFR est AA >60 >60 ml/min/1.73m2    GFR est non-AA >60 >60 ml/min/1.73m2    Calcium 8.6 8.5 - 10.1 MG/DL    Bilirubin, total 1.3 (H) 0.2 - 1.0 MG/DL    ALT (SGPT) 34 12 - 78 U/L    AST (SGOT) 43 (H) 15 - 37 U/L    Alk.  phosphatase 377 (H) 45 - 117 U/L    Protein, total 6.8 6.4 - 8.2 g/dL    Albumin 1.8 (L) 3.5 - 5.0 g/dL    Globulin 5.0 (H) 2.0 - 4.0 g/dL    A-G Ratio 0.4 (L) 1.1 - 2.2     CBC W/O DIFF   Result Value Ref Range    WBC 19.2 (H) 3.6 - 11.0 K/uL    RBC 2.99 (L) 3.80 - 5.20 M/uL    HGB 8.6 (L) 11.5 - 16.0 g/dL    HCT 26.3 (L) 35.0 - 47.0 %    MCV 88.0 80.0 - 99.0 FL    MCH 28.8 26.0 - 34.0 PG    MCHC 32.7 30.0 - 36.5 g/dL    RDW 14.5 11.5 - 14.5 %    PLATELET 584 036 - 309 K/uL   METABOLIC PANEL, BASIC   Result Value Ref Range    Sodium 134 (L) 136 - 145 mmol/L    Potassium 3.9 3.5 - 5.1 mmol/L    Chloride 98 97 - 108 mmol/L    CO2 26 21 - 32 mmol/L    Anion gap 10 5 - 15 mmol/L    Glucose 84 65 - 100 mg/dL    BUN 8 6 - 20 MG/DL    Creatinine 0.64 0.55 - 1.02 MG/DL    BUN/Creatinine ratio 13 12 - 20      GFR est AA >60 >60 ml/min/1.73m2    GFR est non-AA >60 >60 ml/min/1.73m2    Calcium 8.5 8.5 - 10.1 MG/DL   MAGNESIUM   Result Value Ref Range    Magnesium 1.9 1.6 - 2.4 mg/dL   PHOSPHORUS   Result Value Ref Range    Phosphorus 2.4 (L) 2.6 - 4.7 MG/DL   CBC W/O DIFF   Result Value Ref Range    WBC 19.1 (H) 3.6 - 11.0 K/uL    RBC 3.03 (L) 3.80 - 5.20 M/uL    HGB 8.6 (L) 11.5 - 16.0 g/dL    HCT 27.4 (L) 35.0 - 47.0 %    MCV 90.4 80.0 - 99.0 FL    MCH 28.4 26.0 - 34.0 PG    MCHC 31.4 30.0 - 36.5 g/dL    RDW 15.1 (H) 11.5 - 14.5 %    PLATELET 907 (H) 286 - 202 K/uL   METABOLIC PANEL, BASIC   Result Value Ref Range    Sodium 134 (L) 136 - 145 mmol/L    Potassium 3.7 3.5 - 5.1 mmol/L    Chloride 99 97 - 108 mmol/L    CO2 25 21 - 32 mmol/L    Anion gap 10 5 - 15 mmol/L    Glucose 97 65 - 100 mg/dL    BUN 8 6 - 20 MG/DL    Creatinine 0.68 0.55 - 1.02 MG/DL    BUN/Creatinine ratio 12 12 - 20      GFR est AA >60 >60 ml/min/1.73m2    GFR est non-AA >60 >60 ml/min/1.73m2    Calcium 8.5 8.5 - 10.1 MG/DL   EKG, 12 LEAD, INITIAL   Result Value Ref Range    Ventricular Rate 90 BPM    Atrial Rate 90 BPM    P-R Interval 106 ms    QRS Duration 76 ms    Q-T Interval 380 ms    QTC Calculation (Bezet) 464 ms    Calculated P Axis 54 degrees    Calculated R Axis -3 degrees    Calculated T Axis 18 degrees    Diagnosis       Sinus rhythm with short IL  When compared with ECG of 02-APR-2013 10:01,  No significant change was found  Confirmed by Caryl Alvares MD, Lauri Senior ([id number]) on 8/11/2017 6:06:12 PM     TYPE + CROSSMATCH   Result Value Ref Range    Crossmatch Expiration 08/15/2017     ABO/Rh(D) B POSITIVE     Antibody screen NEG     Unit number [id number]     Blood component type RC LR AS1     Unit division 00     Status of unit TRANSFUSED     Crossmatch result Compatible     Unit number [id number]     Blood component type RC LR AS1     Unit division 00     Status of unit TRANSFUSED     Crossmatch result Compatible        Assessment/Plan:    ICD-10-CM ICD-9-CM    1. Metastatic breast cancer (HCC) C50.919 174.9 REFERRAL TO ONCOLOGY    C79.9 199.1    2. Hyponatremia E87.1 276.1    3.  VERNELL (acute kidney injury) ([facility] [address] N17.9 584.9      Orders Placed This Encounter    REFERRAL TO ONCOLOGY     Referral Priority:   Routine     Referral Type:   Consultation     Referral Reason:   Specialty Services Required     [facility] Location[facility]   [facility]     Referred to Provider:   Gilda Salinas MD     Requested Specialty:   Oncology     Number of Visits Requested:   1    ELIQUIS 5 mg tablet     Sig: TK 1 T PO BID     Refill:  0     lose weight, follow low fat diet, follow low salt diet,Take 81mg aspirin daily  Patient Instructions   MyChart Activation    Thank you for requesting access to [employer]. Please follow the instructions below to securely access and download your online medical record. [employer] allows you to send messages to your doctor, view your test results, renew your prescriptions, schedule appointments, and more. How Do I Sign Up? 1. In your internet browser, go to [email or web address]  2. Click on the First Time User? Click Here link in the Sign In box. You will be redirect to the New Member Sign Up page. 3. Enter your [employer] Access Code exactly as it appears below. You will not need to use this code after youve completed the sign-up process. If you do not sign up before the expiration date, you must request a new code. [employer] Access Code: Activation code not generated  Current [employer] Status: Active (This is the date your [employer] access code will )    4. Enter the last four digits of your Social Security Number (xxxx) and Date of Birth (mm/dd/yyyy) as indicated and click Submit. You will be taken to the next sign-up page. 5. Create a [employer] ID. This will be your [employer] login ID and cannot be changed, so think of one that is secure and easy to remember. 6. Create a [employer] password. You can change your password at any time. 7. Enter your Password Reset Question and Answer. This can be used at a later time if you forget your password. 8. Enter your e-mail address. You will receive e-mail notification when new information is available in [address] 9. Click Sign Up. You can now view and download portions of your medical record. 10. Click the Download Summary menu link to download a portable copy of your medical information. Additional Information    If you have questions, please visit the Frequently Asked Questions section of the [employer] website at [email or web address] Remember, [employer] is NOT to be used for urgent needs. For medical emergencies, dial 911.            Follow-up Disposition:  Return in about 4 weeks (around 9/20/2017), or if symptoms worsen or fail to improve. I have reviewed with the patient details of the assessment and plan and all questions were answered. Relevent patient education was performed. The most recent lab findings were reviewed with the patient. An After Visit Summary was printed and given to the patient.

## 2017-08-23 NOTE — MR AVS SNAPSHOT
Visit Information Date & Time Provider Department Dept. Phone Encounter #  
 8/23/2017 10:30 AM Sunshine Hancock MD [address] [phone] [id number] Follow-up Instructions Return in about 4 weeks (around 9/20/2017), or if symptoms worsen or fail to improve. Follow-up and Disposition History Your Appointments 8/29/2017  9:15 AM  
ROUTINE CARE with Sunshine Hancock MD  
[facility] [address] [place] [address] ([facility]) Appt Note: 4 week fu  
 [address] [facility] [facility] [phone]  
[phone]  
  
   
 [address] [place] [address] [phone] Upcoming Health Maintenance Date Due INFLUENZA AGE 9 TO ADULT 8/1/2017 DTaP/Tdap/Td series (1 - Tdap) 4/19/2019* Pneumococcal 65+ High/Highest Risk (2 of 2 - PCV13) 4/5/2018 MEDICARE YEARLY EXAM 4/6/2018 FOBT Q 1 YEAR AGE 50-75 8/12/2018 GLAUCOMA SCREENING Q2Y 4/5/2019 BREAST CANCER SCRN MAMMOGRAM 4/5/2019 *Topic was postponed. The date shown is not the original due date. Allergies as of 8/23/2017  Review Complete On: 8/23/2017 By: Kalina Mancilla LPN Severity Noted Reaction Type Reactions Codeine  09/28/2012    Nausea Only Current Immunizations  Reviewed on 1/30/2013 Name Date Pneumococcal Polysaccharide (PPSV-23) 4/5/2017 Not reviewed this visit You Were Diagnosed With   
  
 Codes Comments Metastatic breast cancer ([facility] [address]    -  Primary ICD-10-CM: C50.919, C79.9 ICD-9-CM: 174.9, 199.1 Hyponatremia     ICD-10-CM: E87.1 ICD-9-CM: 276.1 VERNELL (acute kidney injury) ([facility] [address]     ICD-10-CM: N17.9 ICD-9-CM: 700. 9 Vitals BP Pulse Temp Resp Height(growth percentile) Weight(growth percentile) 110/70 (!) 111 98.2 °F (36.8 °C) (Oral) 16 5' 7\" (1.702 m) 136 lb (61.7 kg) SpO2 BMI OB Status Smoking Status 98% 21.3 kg/m2 Hysterectomy Never Smoker BMI and BSA Data Body Mass Index Body Surface Area  
 21.3 kg/m 2 1.71 m 2 Preferred Pharmacy Pharmacy Name Phone [facility] [address]  [place] [phone] Your Updated Medication List  
  
   
This list is accurate as of: 8/23/17 12:01 PM.  Always use your most recent med list.  
  
  
  
  
 ELIQUIS 5 mg tablet Generic drug:  apixaban TK 1 T PO BID  
  
 enoxaparin 100 mg/mL Commonly known as:  LOVENOX  
60 mg by SubCUTAneous route every twelve (12) hours every twelve (12) hours. multivitamin tablet Commonly known as:  ONE A DAY Take 1 Tab by mouth daily (after breakfast). Takes when remembered. ondansetron 4 mg disintegrating tablet Commonly known as:  ZOFRAN ODT Take 1 Tab by mouth every six (6) hours as needed. oxyCODONE IR 5 mg immediate release tablet Commonly known as:  Kenia Rich Take 1 Tab by mouth every four (4) hours as needed. Max Daily Amount: 30 mg. We Performed the Following REFERRAL TO ONCOLOGY [BTL45 Custom] Follow-up Instructions Return in about 4 weeks (around 9/20/2017), or if symptoms worsen or fail to improve. Referral Information Referral ID Referred By Referred To  
  
 [id number] Waqas Osborne [address] [place] [address], [facility] Visits Status Start Date End Date 1 New Request 8/23/17 8/23/18 If your referral has a status of pending review or denied, additional information will be sent to support the outcome of this decision. Patient Instructions [email or web address] Activation Thank you for requesting access to [email or web address]. Please follow the instructions below to securely access and download your online medical record. [email or web address] allows you to send messages to your doctor, view your test results, renew your prescriptions, schedule appointments, and more. How Do I Sign Up? 1. In your internet browser, go to [email or web address] 
2. Click on the First Time User? Click Here link in the Sign In box. You will be redirect to the New Member Sign Up page. 3. Enter your Immunovaccine Access Code exactly as it appears below. You will not need to use this code after youve completed the sign-up process. If you do not sign up before the expiration date, you must request a new code. Bevo Mediat Access Code: Activation code not generated Current Immunovaccine Status: Active (This is the date your Immunovaccine access code will ) 4. Enter the last four digits of your Social Security Number (xxxx) and Date of Birth (mm/dd/yyyy) as indicated and click Submit. You will be taken to the next sign-up page. 5. Create a Bevo Mediat ID. This will be your Immunovaccine login ID and cannot be changed, so think of one that is secure and easy to remember. 6. Create a Immunovaccine password. You can change your password at any time. 7. Enter your Password Reset Question and Answer. This can be used at a later time if you forget your password. 8. Enter your e-mail address. You will receive e-mail notification when new information is available in [address] 9. Click Sign Up. You can now view and download portions of your medical record. 10. Click the Download Summary menu link to download a portable copy of your medical information. Additional Information If you have questions, please visit the Frequently Asked Questions section of the Immunovaccine website at [email or web address] Remember, Immunovaccine is NOT to be used for urgent needs. For medical emergencies, dial 911. Introducing [facility]! Dear Hanh Izquierdo: Thank you for requesting a Immunovaccine account. Our records indicate that you already have an active Immunovaccine account. You can access your account anytime at [email or web address] Did you know that you can access your hospital and ER discharge instructions at any time in Ibotta? You can also review all of your test results from your hospital stay or ER visit. Additional Information If you have questions, please visit the Frequently Asked Questions section of the Ibotta website at [email or web address] Remember, Ibotta is NOT to be used for urgent needs. For medical emergencies, dial 911. Now available from your iPhone and Android! Please provide this summary of care documentation to your next provider. Your primary care clinician is listed as Bruce Christine. If you have any questions after today's visit, please call [phone].

## 2017-08-30 NOTE — PROGRESS NOTES
Contacted the patient by telephone. The patient was not available but her sister, Rianna Hurst was available. HIPAA was verified by name and . We continued to discuss the goals as listed below. Next outreach in 5-7 days. Goals Addressed      Prepare patients and caregivers for end of life decisions (ie. need for hospice, pain management, symptom relief, advance directives etc.)                  Future goal       Supportive resources in place to maintain patient in the community. The patient is the primary caregiver for her disabled spouse. Last inpatient stay r/t pain from metastatic disease. The following resources would benefit the patient and her spouse. 1.Family to explore personal care agencies in the [facility] area. 2. Family to discuss referral to [facility] Palliative care service during the next office visit. 3. Family to apply for Medicaid. 4. Family to request for UAI     Plan: follow up on goal progress in about 2 weeks. 2017    1. They are continuing to explore personal care agencies. 2. Provided additional education for the benefits of Palliative Care, they are more open to a referral.   3. Medicaid application is in the process of being completed  4. UAI has not been requested at this time, but the family intends to do so.

## 2017-09-06 NOTE — Clinical Note
The family would like a referral to Palliative care- Dr. Alexandra Ruiz.  I have entered the referral.

## 2017-09-06 NOTE — PROGRESS NOTES
Transition of care  Navigator contacted the patient's sister, Arnel Chamberlain by telephone in response to a voice message left during off hours. Verified the patient's HIPAA by name and . Ms. Marquita Graham states that they have completed a family meeting regarding the patient's current health status and plans for future care. The patient desires to continue with chemotherapy. And they would like a referral to outpatient Palliative Medicine to assist with the symptom management for metastatic cancer. Navigator has entered the referral 2017  Plan: Contact for goal progress referral f/u in about 5 days.

## 2017-09-11 NOTE — PROGRESS NOTES
Transition of care  Navigator received call from the patient's sister, ANGELA Chris. HIPAA was verified by name and . Navigator was informed that the patient was admitted to [facility] on . The transition of care episode shall be closed at this time.

## 2017-09-19 NOTE — PROGRESS NOTES
1215  Pt arrived at the [facility]. Pt grimaced with transfer. No complaint at this time. Lungs diminished but clear. No c/o dyspnea at this time. . O2 at 3lpm.  + bowel sounds. Last reported BM was Friday (4 days ago). Pt is wearing a Depend. She is incontinent of urine. +2 pitting Edema in her lower ext. Pt's sisters at the bedside. 200  Dr Lamont Lovell in to assess Ms Kiel Barnett. 1303  Pt medicated with Haldol and Morphine for anxiety and moaning with movement by CL LPN   4675  Pt bathed, turned and repositioned. 1600  Pt resting. No signs of distress. 1800  Daughter Heaven at the bedside. Pt resting comfortably. 31 75 62  Pt's  in to visit. Mr Kiel Barnett has dementia. Rn offered support and education. 1900  Report given. NAME OF PATIENT:  Randall Coleman    LEVEL OF CARE:  Respite    REASON FOR RESPITE:    Caregiver breakdown and Caregiver cannot care for patient due to:  caregiver exhaustion    O2 SAFETY:  Oxygen sign on the door    FALL INTERVENTIONS PROVIDED:   Implemented/recommended use of non-skid footwear, Implemented/recommended use of fall risk identification flag to all team members, Implemented/recommended assistive devices and encouraged their use, Implemented/recommended resources for alarm system (personal alarm, bed alarm, call bell, etc.) , Implemented/recommended environmental changes (remove hazards, lower bed, improve lighting, etc.) and Implemented/recommended increased supervision/assistance    INTERDISPLINARY COMMUNICATION/COLLABORATION:  Physician, MSW, [place] and RN, CNA    NEW MEDICATION INITIATION DOCUMENTATION:  No new medications initiated. COMFORTABLE DYING MEASURE:  Is Patient/family satisfied with symptom level?  yes    DISCHARGE PLAN:  Pt will remain at the  [facility] until she passes away or until her family makes alternative living arrangements.

## 2017-09-19 NOTE — H&P
[facility]  [phone]    Patient Name: Adelaida Rios  YOB: 1950    Date of Provider Hospice Visit: 09/19/17    Level of Care:   [] General Inpatient (GIP)    [] Routine   [x] Respite    Location of Care:  [] [facility] [] [facility] [] [facility] [] [address] [place] [address] [x] Shaun Loyola [facility]    Date of Original Hospice Admission: 9/9/17  Hospice Medical Director for Inpatient Care: Dr. Kirby Koehler Diagnosis: Breast cancer with mets  Diagnoses RELATED to the terminal prognosis: mets to liver and bone        HOSPICE SUMMARY        Adelaida Rios is a 79y.o. year old who was admitted to [address] [place] [address]. The patient's principle diagnosis has resulted in weakness, cachexia, debility, anorexia, respiratory compromise, mets to liver and bone, pain issues, decreased level of consciousness, lethargy, excessive airway secretions. The LCD for Hospice for the admitted diagnosis of cancer includes:    [x] Disease with distant metastases at presentation OR    [x] Progression from an earlier stage of disease to metastatic disease with either:   [] a continued decline in spite of therapy   [x] patient declines further disease directed therapy    [x] Certain cancers with poor prognoses (e.g. small cell lung cancer, brain cancer and pancreatic cancer) may be hospice eligible without fulfilling the other criteria in this section. Functionally, the patient's Karnofsky and/or Palliative Performance Scale has declined over a period of weeks and is estimated at 20%. The patient is dependent on the following ADLs: dependant on al ADLs. Objective information that support this patients limited prognosis includes:    CT scan 8-13-17   1. There is a small amount of fluid surrounding the gallbladder. This may be  ascites, however. No other inflammatory change of the gallbladder noted. 2. Liver metastatic disease again noted unchanged.   3. Lytic lesions of the skeletal structures unchanged. 4. Findings of the lung bases unchanged. 8-7-17  1. 2.6 cm x 2.8 cm right lower lobe lesion consistent with metastatic disease. 8  mm lingular nodule. 2. Extensive hepatic metastatic disease. 3. Osseous metastatic disease, as described above. 4. Bilateral adrenal lesions, indeterminate, but worrisome for metastatic  deposit. 5. Trace fluid in the pelvis. 8-8-17  Bone scan  IMPRESSION: Multiple foci of abnormal tracer activity in the skeleton,  corresponding to lytic bone lesions seen on CT. These findings are compatible  with osseous metastatic disease. The patient/family chose comfort measures with the support of Hospice. HOSPICE DIAGNOSES   Active Symptoms:  1. Weakness, debility, fatigue  2. lethargy AMS  3. Shortness of breath  4. Excessive airway secretions  5. Pain, generalized   6. Cachectic      PLAN   1. Admit respite care for caregiver breakdown  2. Comfort measures  3. Roxanol 5 mg every 4 hours, and 10 mg every 1 hour as needed  4. Haldol  0.5mg every 4 hours as needed  5. Zofran as needed  6. Ativan 1mg as needed every 1 hour    7.  and SW to support family needs  8. Disposition: home after respite    Prognosis estimated based on 09/19/17 clinical assessment is:   [x] Hours to Days    [] Days to Weeks    [] Other:    Communicated plan of care with: Hospice Case Manager;  Hospice IDT; Care Team     GOALS OF CARE     Resuscitation Status: DNR  Durable DNR: [x] Yes [] No    Advance Care Planning 8/11/2017   Patient's Healthcare Decision Maker is: Legal Next of Kin   Primary Decision Maker Name -   Primary Decision Maker Phone Number -   Primary Decision Maker Relationship to Patient -   Confirm Advance Directive None   Patient Would Like to Complete Advance Directive -        HISTORY     History obtained from: chart, SN, family    CHIEF COMPLAINT:    The patient is:  Lethargic, able to speak but very minimal  [x] Verbal  [] Nonverbal  [] Unresponsive    HPI/SUBJECTIVE:  79year old female with a hx of metastatic breast cancer       REVIEW OF SYSTEMS     The following systems were: [x] reviewed  [] unable to be reviewed    Positive ROS include:  Constitutional: lethargic  Ears/nose/mouth/throat:  Respiratory:  Gastrointestinal:  Musculoskeletal: weakness  Neurologic:  Psychiatric:  Endocrine:     Adult Non-Verbal Pain Assessment Score:  7/10  Face  [] 0   No particular expression or smile  [] 1   Occasional grimace, tearing, frowning, wrinkled forehead  [x] 2   Frequent grimace, tearing, frowning, wrinkled forehead    Activity (movement)  [] 0   Lying quietly, normal position  [] 1   Seeking attention through movement or slow, cautious movement  [x] 2   Restless, excessive activity and/or withdrawal reflexes    Guarding  [] 0   Lying quietly, no positioning of hands over areas of body  [] 1   Splinting areas of the body, tense  [x] 2   Rigid, stiff    Physiology (vital signs)  [] 0   Stable vital signs  [x] 1   Change in any of the following: SBP > 20mm Hg; HR > 20/minute  [] 2   Change in any of the following: SBP > 30mm Hg; HR > 25/minute    Respiratory  [x] 0   Baseline RR/SpO2, compliant with ventilator  [] 1   RR > 10 above baseline, or 5% drop SpO2, mild asynchrony with ventilator  [] 2   RR > 20 above baseline, or 10% drop SpO2, asynchrony with ventilator     FUNCTIONAL ASSESSMENT     Palliative Performance Scale (PPS): 20%     PSYCHOSOCIAL/SPIRITUAL ASSESSMENT     Active Problems:    * No active hospital problems.  *    Past Medical History:   Diagnosis Date    Anxiety and depression     Breast CA ([facility] [address] 2012    Left only but had a bilateral mastectomy/chemo    GERD (gastroesophageal reflux disease)     not an issure, no medications    Hypertension     Ill-defined condition     pinched nerve in back    Other ill-defined conditions     last chemo treatment 1/30/13    Sickle cell trait [facility])       Past Surgical History:   Procedure Laterality Date    ABDOMEN SURGERY PROC UNLISTED      hernia as child    BREAST SURGERY PROCEDURE UNLISTED  10/11/12    LEFT SLNB and port-a-cath insertion    BREAST SURGERY PROCEDURE UNLISTED      bilateral mastectomy with reconstruction- states not arm restricted    COLONOSCOPY N/A 5/10/2017    COLONOSCOPY performed by Bronson Yang MD at [address] HX BREAST RECONSTRUCTION  7/1/2013    BREAST TISSUE EXPANDER REMOVAL performed by Blu Zambrano MD at [address] [facility] [address] HX LYMPHADENECTOMY  October 2012    sentinel node biopsy, negative    HX ORTHOPAEDIC Bilateral     ground down the bone d/t pressure on toes from shoes    HX VASCULAR ACCESS      insertion and removal    HX WISDOM TEETH EXTRACTION        Social History   Substance Use Topics    Smoking status: Never Smoker    Smokeless tobacco: Never Used    Alcohol use No     Family History   Problem Relation Age of Onset    Cancer Mother      colon cancer    Heart Disease Father     Diabetes Father     Cancer Sister 27     breast cancer    MS Sister     Cancer Other 36     breast cancer    Cancer Maternal Grandfather      MOUTH/THROAT    Cancer Paternal Grandmother      BRAIN    Sickle Cell Anemia Brother     Malignant Hyperthermia Neg Hx     Pseudocholinesterase Deficiency Neg Hx     Delayed Awakening Neg Hx     Post-op Nausea/Vomiting Neg Hx     Emergence Delirium Neg Hx     Post-op Cognitive Dysfunction Neg Hx     Other Neg Hx       Allergies   Allergen Reactions    Codeine Nausea Only      Current Facility-Administered Medications   Medication Dose Route Frequency    bisacodyl (DULCOLAX) suppository 10 mg  10 mg Rectal DAILY PRN    morphine (ROXANOL) 100 mg/5 mL (20 mg/mL) concentrated solution 10 mg  10 mg Oral Q1H PRN    morphine (ROXANOL) 100 mg/5 mL (20 mg/mL) concentrated solution 5 mg  5 mg SubLINGual Q4H    LORazepam (INTENSOL) 2 mg/mL oral concentrate 1 mg  1 mg Oral Q1H PRN  scopolamine (TRANSDERM-SCOP) 1.5 mg  1.5 mg TransDERmal Q72H    senna (SENOKOT) tablet 8.6 mg  1 Tab Oral BID    acetaminophen (TYLENOL) suppository 650 mg  650 mg Rectal Q4H PRN    haloperidol (HALDOL) 2 mg/mL oral solution 0.5 mg  0.5 mg Oral Q4H PRN    ondansetron (ZOFRAN ODT) tablet 4 mg  4 mg Oral Q6H PRN        PHYSICAL EXAM     Wt Readings from Last 3 Encounters:   09/09/17 61.7 kg (136 lb 0.4 oz)   08/23/17 61.7 kg (136 lb)   08/11/17 61.7 kg (136 lb)       Visit Vitals    BP (!) 84/58 (BP 1 Location: Right arm, BP Patient Position: At rest)    Pulse (!) 112    Temp 97.5 °F (36.4 °C)    Resp 18    SpO2 97%       Supplemental O2  [x] Yes  [] NO  Last bowel movement:     Currently this patient has:  [] Peripheral IV [] PICC  [] PORT [] ICD    [] [place]oto Catheter [] NG Tube   [] PEG Tube    [] Rectal Tube [] Drain  [] Other:     Constitutional: minimally responsive  Eyes:cl   ENMT: airway secretions  Cardiovascular: HR tachy  Respiratory: decreased BS  Gastrointestinal: SNT  Musculoskeletal: muscle wasting and weakness  Skin: warm, dry, pale  Neurologic: does not follow commands  Psychiatric: calm  Other:    Pertinent Lab and or Imaging Tests:  Lab Results   Component Value Date/Time    Sodium 134 08/16/2017 02:33 AM    Potassium 3.7 08/16/2017 02:33 AM    Chloride 99 08/16/2017 02:33 AM    CO2 25 08/16/2017 02:33 AM    Anion gap 10 08/16/2017 02:33 AM    Glucose 97 08/16/2017 02:33 AM    BUN 8 08/16/2017 02:33 AM    Creatinine 0.68 08/16/2017 02:33 AM    BUN/Creatinine ratio 12 08/16/2017 02:33 AM    GFR est AA >60 08/16/2017 02:33 AM    GFR est non-AA >60 08/16/2017 02:33 AM    Calcium 8.5 08/16/2017 02:33 AM     Lab Results   Component Value Date/Time    Protein, total 6.8 08/14/2017 02:18 AM    Albumin 1.8 08/14/2017 02:18 AM           Total time: 55  Counseling / coordination time: 45  > 50% counseling / coordination?: tess Gomez NP

## 2017-09-19 NOTE — PROGRESS NOTES
13: 03:Haldol adm for agitation. 13:30:Assess Pt, vitals taken, Full bed bath given, skin intact no open areas. Pt remains comfortable after morphine and haldol dosage. 15:30:Pt resting quietly repositioned for comfort. 1700: Adm schedule morphine, pt is less responsive to verbal and tactile stimuli, sister is at bedside.   1900:Report given to RN

## 2017-09-20 NOTE — HOSPICE
NAME OF PATIENT:  Shaista Coleman    LEVEL OF CARE:  respite    REASON FOR GIP:       *PATIENT REMAINS ELIGIBLE FOR GIP LEVEL OF CARE AS EVIDENCED BY: (MUST BE ADDRESSED OF PATIENT GIP)      REASON FOR RESPITE:  Caregiver breakdown    O2 SAFETY:  Concentrator positioning (6\" from furniture/drapes), Tanks stored in law , No petroleum based products on face while oxygen in use and Oxygen sign on the door    FALL INTERVENTIONS PROVIDED:   Implemented/recommended use of non-skid footwear, Implemented/recommended use of fall risk identification flag to all team members, Implemented/recommended assistive devices and encouraged their use, Implemented/recommended resources for alarm system (personal alarm, bed alarm, call bell, etc.)  and Implemented/recommended environmental changes (remove hazards, lower bed, improve lighting, etc.)    INTERDISPLINARY COMMUNICATION/COLLABORATION:  Physician, MSW, [place] and RN, CNA    NEW MEDICATION INITIATION DOCUMENTATION:      Reason medication is being initiated:      MD / Provider name consulted re: change in status / initiation of new medication:      New Symptom(s):      New Order(s):      Name of the person notified of the changes:      Name of person being taught:      Instructions given:      Side Effects taught:      Response to teachin [address] free  Is Patient/family satisfied with symptom level?  yes    DISCHARGE PLAN:  Pt will likely pass away here @ [facility]    19:47,report received,assumed care of pt lying in bed with eyes closed. Pt opened eyes when I checked her response to pain,she grimaced. Her Heart rate was 120,so I medicated her with Roxanol SL which she tolerated well.02 @ 3ltr on,brief is dry. 21:00,sister Heaven is @ bedside. Pt turned to right side,she grimaced with movement. 21:25,sister requested Haldol,and it was given SL. Temp is 100.7,continue to monitor. 22:40,,medicated with Roxanol 10mg SL for comfort.   23:50,HR 120,medicated with Roxanol. 01:00,EPIC DOWNTIME UNTIL 05:00.  02:00,pt remains tachycardic,medicated with roxanol. 02:05,noted agonal resp. ,I awoke sisters Maggy Johnson and Chhaya Bennett. 02:10,pt pronounced by this RN. Heaven asked me not to awaken the  @ this time,as they are grieving appropriately. 02:45[facility] has been notified.

## 2017-09-24 NOTE — DISCHARGE SUMMARY
Discharge Summary    [facility]  [facility]  [phone]      Date of Admission: 9/19/2017  Date of Discharge: 9/20/2017    Ramona Bahena is a 79y.o. year old who was admitted to [facility] at [facility] with a Hospice diagnosis of Breast Ca. Pt was admitted for respite care and maintained on home comfort medications. She was doing poorly and close to the end with symptoms of end of life including pain, nausea, poor appetite and decreasing sensorium    The patient's care was focused on comfort and the patient passed away on 9/20/2017.

## (undated) DEVICE — BW-412T DISP COMBO CLEANING BRUSH: Brand: SINGLE USE COMBINATION CLEANING BRUSH

## (undated) DEVICE — 1200 GUARD II KIT W/5MM TUBE W/O VAC TUBE: Brand: GUARDIAN

## (undated) DEVICE — BAG BELONG PT PERS CLEAR HANDL

## (undated) DEVICE — Z DISCONTINUED USE 2751540 TUBING IRRIG L10IN DISP PMP ENDOGATOR

## (undated) DEVICE — KENDALL RADIOLUCENT FOAM MONITORING ELECTRODE -RECTANGULAR SHAPE: Brand: KENDALL

## (undated) DEVICE — NEEDLE HYPO 18GA L1.5IN PNK S STL HUB POLYPR SHLD REG BVL

## (undated) DEVICE — SOLIDIFIER FLUID 3000 CC ABSORB

## (undated) DEVICE — SET ADMIN 16ML TBNG L100IN 2 Y INJ SITE IV PIGGY BK DISP

## (undated) DEVICE — SET EXTN TBNG L BOR 4 W STPCOCK ST 32IN PRIMING VOL 6ML

## (undated) DEVICE — SYRINGE MED 20ML STD CLR PLAS LUERLOCK TIP N CTRL DISP

## (undated) DEVICE — ENDO CARRY-ON PROCEDURE KIT INCLUDES ENZYMATIC SPONGE, GAUZE, BIOHAZARD LABEL, TRAY, LUBRICANT, DIRTY SCOPE LABEL, WATER LABEL, TRAY, DRAWSTRING PAD, AND DEFENDO 4-PIECE KIT.: Brand: ENDO CARRY-ON PROCEDURE KIT

## (undated) DEVICE — SYRINGE CATH TIP 50ML

## (undated) DEVICE — KIT IV STRT W CHLORAPREP PD 1ML

## (undated) DEVICE — QUILTED PREMIUM COMFORT UNDERPAD,EXTRA HEAVY: Brand: WINGS

## (undated) DEVICE — CONNECTOR TBNG AUX H2O JET DISP FOR OLY 160/180 SER

## (undated) DEVICE — CATH IV AUTOGRD BC BLU 22GA 25 -- INSYTE

## (undated) DEVICE — AIRLIFE™ U/CONNECT-IT OXYGEN TUBING 7 FEET (2.1 M) CRUSH-RESISTANT OXYGEN TUBING, VINYL TIPPED: Brand: AIRLIFE™